# Patient Record
Sex: FEMALE | Race: WHITE | Employment: PART TIME | URBAN - METROPOLITAN AREA
[De-identification: names, ages, dates, MRNs, and addresses within clinical notes are randomized per-mention and may not be internally consistent; named-entity substitution may affect disease eponyms.]

---

## 2017-01-13 ENCOUNTER — OFFICE VISIT (OUTPATIENT)
Dept: FAMILY MEDICINE CLINIC | Age: 72
End: 2017-01-13

## 2017-01-13 VITALS
BODY MASS INDEX: 49.9 KG/M2 | RESPIRATION RATE: 16 BRPM | DIASTOLIC BLOOD PRESSURE: 78 MMHG | HEART RATE: 76 BPM | SYSTOLIC BLOOD PRESSURE: 118 MMHG | WEIGHT: 271.2 LBS | TEMPERATURE: 97.9 F | HEIGHT: 62 IN

## 2017-01-13 DIAGNOSIS — N18.30 CKD (CHRONIC KIDNEY DISEASE) STAGE 3, GFR 30-59 ML/MIN (HCC): ICD-10-CM

## 2017-01-13 DIAGNOSIS — E66.01 MORBID OBESITY WITH BMI OF 45.0-49.9, ADULT (HCC): ICD-10-CM

## 2017-01-13 DIAGNOSIS — E03.9 ACQUIRED HYPOTHYROIDISM: ICD-10-CM

## 2017-01-13 DIAGNOSIS — F41.1 GENERALIZED ANXIETY DISORDER: ICD-10-CM

## 2017-01-13 DIAGNOSIS — M75.102 ROTATOR CUFF SYNDROME, LEFT: ICD-10-CM

## 2017-01-13 DIAGNOSIS — E78.2 MIXED HYPERLIPIDEMIA: ICD-10-CM

## 2017-01-13 DIAGNOSIS — I10 ESSENTIAL HYPERTENSION: Primary | ICD-10-CM

## 2017-01-13 DIAGNOSIS — F33.42 RECURRENT MAJOR DEPRESSIVE DISORDER, IN FULL REMISSION (HCC): ICD-10-CM

## 2017-01-13 PROCEDURE — G8419 CALC BMI OUT NRM PARAM NOF/U: HCPCS | Performed by: FAMILY MEDICINE

## 2017-01-13 PROCEDURE — 3014F SCREEN MAMMO DOC REV: CPT | Performed by: FAMILY MEDICINE

## 2017-01-13 PROCEDURE — 99214 OFFICE O/P EST MOD 30 MIN: CPT | Performed by: FAMILY MEDICINE

## 2017-01-13 PROCEDURE — 1123F ACP DISCUSS/DSCN MKR DOCD: CPT | Performed by: FAMILY MEDICINE

## 2017-01-13 PROCEDURE — G8484 FLU IMMUNIZE NO ADMIN: HCPCS | Performed by: FAMILY MEDICINE

## 2017-01-13 PROCEDURE — 1090F PRES/ABSN URINE INCON ASSESS: CPT | Performed by: FAMILY MEDICINE

## 2017-01-13 PROCEDURE — 4040F PNEUMOC VAC/ADMIN/RCVD: CPT | Performed by: FAMILY MEDICINE

## 2017-01-13 PROCEDURE — 3017F COLORECTAL CA SCREEN DOC REV: CPT | Performed by: FAMILY MEDICINE

## 2017-01-13 PROCEDURE — G8399 PT W/DXA RESULTS DOCUMENT: HCPCS | Performed by: FAMILY MEDICINE

## 2017-01-13 PROCEDURE — 1036F TOBACCO NON-USER: CPT | Performed by: FAMILY MEDICINE

## 2017-01-13 PROCEDURE — G8427 DOCREV CUR MEDS BY ELIG CLIN: HCPCS | Performed by: FAMILY MEDICINE

## 2017-01-28 ENCOUNTER — OFFICE VISIT (OUTPATIENT)
Dept: FAMILY MEDICINE CLINIC | Age: 72
End: 2017-01-28

## 2017-01-28 VITALS
HEIGHT: 62 IN | OXYGEN SATURATION: 98 % | TEMPERATURE: 97.4 F | WEIGHT: 268.8 LBS | SYSTOLIC BLOOD PRESSURE: 112 MMHG | BODY MASS INDEX: 49.47 KG/M2 | RESPIRATION RATE: 22 BRPM | DIASTOLIC BLOOD PRESSURE: 78 MMHG | HEART RATE: 70 BPM

## 2017-01-28 DIAGNOSIS — R30.0 DYSURIA: ICD-10-CM

## 2017-01-28 DIAGNOSIS — R30.0 DYSURIA: Primary | ICD-10-CM

## 2017-01-28 DIAGNOSIS — N30.90 CYSTITIS: ICD-10-CM

## 2017-01-28 LAB
BILIRUBIN, POC: ABNORMAL
BLOOD URINE, POC: ABNORMAL
CLARITY, POC: ABNORMAL
COLOR, POC: ABNORMAL
GLUCOSE URINE, POC: ABNORMAL
KETONES, POC: ABNORMAL
LEUKOCYTE EST, POC: 500
NITRITE, POC: ABNORMAL
PH, POC: 6
PROTEIN, POC: ABNORMAL
SPECIFIC GRAVITY, POC: 1.02
UROBILINOGEN, POC: 3.5

## 2017-01-28 PROCEDURE — G8484 FLU IMMUNIZE NO ADMIN: HCPCS | Performed by: NURSE PRACTITIONER

## 2017-01-28 PROCEDURE — 1123F ACP DISCUSS/DSCN MKR DOCD: CPT | Performed by: NURSE PRACTITIONER

## 2017-01-28 PROCEDURE — 81003 URINALYSIS AUTO W/O SCOPE: CPT | Performed by: NURSE PRACTITIONER

## 2017-01-28 PROCEDURE — 1090F PRES/ABSN URINE INCON ASSESS: CPT | Performed by: NURSE PRACTITIONER

## 2017-01-28 PROCEDURE — 4040F PNEUMOC VAC/ADMIN/RCVD: CPT | Performed by: NURSE PRACTITIONER

## 2017-01-28 PROCEDURE — 1036F TOBACCO NON-USER: CPT | Performed by: NURSE PRACTITIONER

## 2017-01-28 PROCEDURE — 3017F COLORECTAL CA SCREEN DOC REV: CPT | Performed by: NURSE PRACTITIONER

## 2017-01-28 PROCEDURE — G8399 PT W/DXA RESULTS DOCUMENT: HCPCS | Performed by: NURSE PRACTITIONER

## 2017-01-28 PROCEDURE — G8419 CALC BMI OUT NRM PARAM NOF/U: HCPCS | Performed by: NURSE PRACTITIONER

## 2017-01-28 PROCEDURE — 99213 OFFICE O/P EST LOW 20 MIN: CPT | Performed by: NURSE PRACTITIONER

## 2017-01-28 PROCEDURE — 3014F SCREEN MAMMO DOC REV: CPT | Performed by: NURSE PRACTITIONER

## 2017-01-28 PROCEDURE — G8427 DOCREV CUR MEDS BY ELIG CLIN: HCPCS | Performed by: NURSE PRACTITIONER

## 2017-01-28 RX ORDER — CEFDINIR 300 MG/1
300 CAPSULE ORAL 2 TIMES DAILY
Qty: 20 CAPSULE | Refills: 0 | Status: SHIPPED | OUTPATIENT
Start: 2017-01-28 | End: 2017-02-07

## 2017-01-28 RX ORDER — PHENAZOPYRIDINE HYDROCHLORIDE 100 MG/1
100 TABLET, FILM COATED ORAL 3 TIMES DAILY PRN
Qty: 6 TABLET | Refills: 0 | Status: SHIPPED | OUTPATIENT
Start: 2017-01-28 | End: 2017-05-12 | Stop reason: ALTCHOICE

## 2017-01-28 ASSESSMENT — ENCOUNTER SYMPTOMS
BACK PAIN: 0
EYE DISCHARGE: 0
TROUBLE SWALLOWING: 0
GASTROINTESTINAL NEGATIVE: 1
RHINORRHEA: 0
FACIAL SWELLING: 0
WHEEZING: 0
ALLERGIC/IMMUNOLOGIC NEGATIVE: 1
SINUS PRESSURE: 0
EYE REDNESS: 0
NAUSEA: 0
CHEST TIGHTNESS: 0
EYE ITCHING: 0
VOMITING: 0
SORE THROAT: 0
VOICE CHANGE: 0
SHORTNESS OF BREATH: 0
COUGH: 0
EYE PAIN: 0

## 2017-01-30 LAB
ORGANISM: ABNORMAL
URINE CULTURE, ROUTINE: ABNORMAL

## 2017-04-11 ENCOUNTER — OFFICE VISIT (OUTPATIENT)
Dept: FAMILY MEDICINE CLINIC | Age: 72
End: 2017-04-11

## 2017-04-11 VITALS
SYSTOLIC BLOOD PRESSURE: 126 MMHG | HEIGHT: 62 IN | BODY MASS INDEX: 50.05 KG/M2 | OXYGEN SATURATION: 97 % | RESPIRATION RATE: 22 BRPM | TEMPERATURE: 97.2 F | WEIGHT: 272 LBS | DIASTOLIC BLOOD PRESSURE: 74 MMHG | HEART RATE: 83 BPM

## 2017-04-11 DIAGNOSIS — R39.9 UTI SYMPTOMS: ICD-10-CM

## 2017-04-11 DIAGNOSIS — N30.00 ACUTE CYSTITIS WITHOUT HEMATURIA: Primary | ICD-10-CM

## 2017-04-11 LAB
BILIRUBIN, POC: ABNORMAL
BLOOD URINE, POC: ABNORMAL
CLARITY, POC: ABNORMAL
COLOR, POC: ABNORMAL
GLUCOSE URINE, POC: ABNORMAL
KETONES, POC: ABNORMAL
LEUKOCYTE EST, POC: 500
NITRITE, POC: ABNORMAL
PH, POC: 6
PROTEIN, POC: 0.15
SPECIFIC GRAVITY, POC: 1.03
UROBILINOGEN, POC: 3.5

## 2017-04-11 PROCEDURE — G8427 DOCREV CUR MEDS BY ELIG CLIN: HCPCS | Performed by: NURSE PRACTITIONER

## 2017-04-11 PROCEDURE — 3014F SCREEN MAMMO DOC REV: CPT | Performed by: NURSE PRACTITIONER

## 2017-04-11 PROCEDURE — G8399 PT W/DXA RESULTS DOCUMENT: HCPCS | Performed by: NURSE PRACTITIONER

## 2017-04-11 PROCEDURE — 4040F PNEUMOC VAC/ADMIN/RCVD: CPT | Performed by: NURSE PRACTITIONER

## 2017-04-11 PROCEDURE — G8417 CALC BMI ABV UP PARAM F/U: HCPCS | Performed by: NURSE PRACTITIONER

## 2017-04-11 PROCEDURE — 99213 OFFICE O/P EST LOW 20 MIN: CPT | Performed by: NURSE PRACTITIONER

## 2017-04-11 PROCEDURE — 81003 URINALYSIS AUTO W/O SCOPE: CPT | Performed by: NURSE PRACTITIONER

## 2017-04-11 PROCEDURE — 3017F COLORECTAL CA SCREEN DOC REV: CPT | Performed by: NURSE PRACTITIONER

## 2017-04-11 PROCEDURE — 1090F PRES/ABSN URINE INCON ASSESS: CPT | Performed by: NURSE PRACTITIONER

## 2017-04-11 PROCEDURE — 1123F ACP DISCUSS/DSCN MKR DOCD: CPT | Performed by: NURSE PRACTITIONER

## 2017-04-11 PROCEDURE — 1036F TOBACCO NON-USER: CPT | Performed by: NURSE PRACTITIONER

## 2017-04-11 RX ORDER — CIPROFLOXACIN 500 MG/1
500 TABLET, FILM COATED ORAL 2 TIMES DAILY
Qty: 20 TABLET | Refills: 0 | Status: SHIPPED | OUTPATIENT
Start: 2017-04-11 | End: 2017-04-21

## 2017-04-11 ASSESSMENT — ENCOUNTER SYMPTOMS
BACK PAIN: 0
NAUSEA: 0

## 2017-04-12 DIAGNOSIS — N30.00 ACUTE CYSTITIS WITHOUT HEMATURIA: ICD-10-CM

## 2017-04-14 LAB
ORGANISM: ABNORMAL
URINE CULTURE, ROUTINE: ABNORMAL

## 2017-04-25 ENCOUNTER — NURSE ONLY (OUTPATIENT)
Dept: FAMILY MEDICINE CLINIC | Age: 72
End: 2017-04-25

## 2017-04-25 ENCOUNTER — TELEPHONE (OUTPATIENT)
Dept: FAMILY MEDICINE CLINIC | Age: 72
End: 2017-04-25

## 2017-04-25 DIAGNOSIS — N39.0 URINARY TRACT INFECTION WITH HEMATURIA, SITE UNSPECIFIED: ICD-10-CM

## 2017-04-25 DIAGNOSIS — R31.9 URINARY TRACT INFECTION WITH HEMATURIA, SITE UNSPECIFIED: Primary | ICD-10-CM

## 2017-04-25 DIAGNOSIS — R31.9 URINARY TRACT INFECTION WITH HEMATURIA, SITE UNSPECIFIED: ICD-10-CM

## 2017-04-25 DIAGNOSIS — N39.0 URINARY TRACT INFECTION WITH HEMATURIA, SITE UNSPECIFIED: Primary | ICD-10-CM

## 2017-04-25 DIAGNOSIS — N30.90 CYSTITIS: Primary | ICD-10-CM

## 2017-04-25 LAB
BILIRUBIN, POC: NEGATIVE
BLOOD URINE, POC: NORMAL
CLARITY, POC: NORMAL
COLOR, POC: NORMAL
GLUCOSE URINE, POC: NEGATIVE
KETONES, POC: NEGATIVE
LEUKOCYTE EST, POC: NORMAL
NITRITE, POC: NEGATIVE
PH, POC: 6
PROTEIN, POC: NORMAL
SPECIFIC GRAVITY, POC: 1.03
UROBILINOGEN, POC: 3.5

## 2017-04-25 PROCEDURE — 81003 URINALYSIS AUTO W/O SCOPE: CPT | Performed by: FAMILY MEDICINE

## 2017-04-27 LAB — URINE CULTURE, ROUTINE: NORMAL

## 2017-04-29 LAB
ALBUMIN SERPL-MCNC: 4.2 G/DL
ALP BLD-CCNC: 76 U/L
ALT SERPL-CCNC: 13 U/L
AST SERPL-CCNC: 17 U/L
BASOPHILS ABSOLUTE: 0.1 /ΜL
BASOPHILS RELATIVE PERCENT: 1 %
BILIRUB SERPL-MCNC: 0.3 MG/DL (ref 0.1–1.4)
BUN BLDV-MCNC: 28 MG/DL
CALCIUM SERPL-MCNC: 9.9 MG/DL
CHLORIDE BLD-SCNC: 102 MMOL/L
CHOLESTEROL, TOTAL: 173 MG/DL
CHOLESTEROL/HDL RATIO: ABNORMAL
CO2: 22 MMOL/L
CREAT SERPL-MCNC: 1.05 MG/DL
EOSINOPHILS ABSOLUTE: 0.4 /ΜL
EOSINOPHILS RELATIVE PERCENT: 6 %
GFR CALCULATED: NORMAL
GLUCOSE BLD-MCNC: 96 MG/DL
HCT VFR BLD CALC: 37.6 % (ref 36–46)
HDLC SERPL-MCNC: 91 MG/DL (ref 35–70)
HEMOGLOBIN: 12.1 G/DL (ref 12–16)
LDL CHOLESTEROL CALCULATED: 60 MG/DL (ref 0–160)
LYMPHOCYTES ABSOLUTE: 2.2 /ΜL
LYMPHOCYTES RELATIVE PERCENT: 36 %
MCH RBC QN AUTO: 26.7 PG
MCHC RBC AUTO-ENTMCNC: 32.2 G/DL
MCV RBC AUTO: 83 FL
MONOCYTES ABSOLUTE: 0.4 /ΜL
MONOCYTES RELATIVE PERCENT: 7 %
NEUTROPHILS ABSOLUTE: 3 /ΜL
NEUTROPHILS RELATIVE PERCENT: 50 %
PDW BLD-RTO: 15.2 %
PLATELET # BLD: 358 K/ΜL
PMV BLD AUTO: NORMAL FL
POTASSIUM SERPL-SCNC: 4.9 MMOL/L
RBC # BLD: 4.53 10^6/ΜL
SODIUM BLD-SCNC: 140 MMOL/L
T4 FREE: 1.48
TOTAL PROTEIN: 7.2
TRIGL SERPL-MCNC: 108 MG/DL
TSH SERPL DL<=0.05 MIU/L-ACNC: 0.88 UIU/ML
VLDLC SERPL CALC-MCNC: 22 MG/DL
WBC # BLD: 6.1 10^3/ML

## 2017-05-01 DIAGNOSIS — E03.9 ACQUIRED HYPOTHYROIDISM: ICD-10-CM

## 2017-05-01 DIAGNOSIS — E78.2 MIXED HYPERLIPIDEMIA: ICD-10-CM

## 2017-05-01 DIAGNOSIS — I10 ESSENTIAL HYPERTENSION: ICD-10-CM

## 2017-05-01 DIAGNOSIS — N18.30 CKD (CHRONIC KIDNEY DISEASE) STAGE 3, GFR 30-59 ML/MIN (HCC): ICD-10-CM

## 2017-05-12 ENCOUNTER — OFFICE VISIT (OUTPATIENT)
Dept: FAMILY MEDICINE CLINIC | Age: 72
End: 2017-05-12

## 2017-05-12 VITALS
HEIGHT: 62 IN | BODY MASS INDEX: 50.38 KG/M2 | DIASTOLIC BLOOD PRESSURE: 80 MMHG | WEIGHT: 273.8 LBS | SYSTOLIC BLOOD PRESSURE: 138 MMHG | RESPIRATION RATE: 18 BRPM | TEMPERATURE: 97.3 F | HEART RATE: 94 BPM

## 2017-05-12 DIAGNOSIS — I10 ESSENTIAL HYPERTENSION: Primary | ICD-10-CM

## 2017-05-12 DIAGNOSIS — N18.30 CKD (CHRONIC KIDNEY DISEASE) STAGE 3, GFR 30-59 ML/MIN (HCC): ICD-10-CM

## 2017-05-12 DIAGNOSIS — E66.01 MORBID OBESITY WITH BMI OF 50.0-59.9, ADULT (HCC): ICD-10-CM

## 2017-05-12 DIAGNOSIS — F41.1 GENERALIZED ANXIETY DISORDER: ICD-10-CM

## 2017-05-12 DIAGNOSIS — E03.9 ACQUIRED HYPOTHYROIDISM: ICD-10-CM

## 2017-05-12 DIAGNOSIS — E78.2 MIXED HYPERLIPIDEMIA: ICD-10-CM

## 2017-05-12 PROCEDURE — G8399 PT W/DXA RESULTS DOCUMENT: HCPCS | Performed by: FAMILY MEDICINE

## 2017-05-12 PROCEDURE — 1090F PRES/ABSN URINE INCON ASSESS: CPT | Performed by: FAMILY MEDICINE

## 2017-05-12 PROCEDURE — 99214 OFFICE O/P EST MOD 30 MIN: CPT | Performed by: FAMILY MEDICINE

## 2017-05-12 PROCEDURE — 4040F PNEUMOC VAC/ADMIN/RCVD: CPT | Performed by: FAMILY MEDICINE

## 2017-05-12 PROCEDURE — G8417 CALC BMI ABV UP PARAM F/U: HCPCS | Performed by: FAMILY MEDICINE

## 2017-05-12 PROCEDURE — 1036F TOBACCO NON-USER: CPT | Performed by: FAMILY MEDICINE

## 2017-05-12 PROCEDURE — 1123F ACP DISCUSS/DSCN MKR DOCD: CPT | Performed by: FAMILY MEDICINE

## 2017-05-12 PROCEDURE — 3014F SCREEN MAMMO DOC REV: CPT | Performed by: FAMILY MEDICINE

## 2017-05-12 PROCEDURE — 3017F COLORECTAL CA SCREEN DOC REV: CPT | Performed by: FAMILY MEDICINE

## 2017-05-12 PROCEDURE — G8427 DOCREV CUR MEDS BY ELIG CLIN: HCPCS | Performed by: FAMILY MEDICINE

## 2017-05-12 RX ORDER — LISINOPRIL AND HYDROCHLOROTHIAZIDE 25; 20 MG/1; MG/1
1 TABLET ORAL DAILY
Qty: 90 TABLET | Refills: 2 | Status: SHIPPED | OUTPATIENT
Start: 2017-05-12 | End: 2018-01-12 | Stop reason: SDUPTHER

## 2017-05-12 RX ORDER — MELOXICAM 15 MG/1
15 TABLET ORAL DAILY
Qty: 90 TABLET | Refills: 2 | Status: SHIPPED | OUTPATIENT
Start: 2017-05-12 | End: 2018-01-12 | Stop reason: ALTCHOICE

## 2017-05-12 RX ORDER — UBIDECARENONE 75 MG
100 CAPSULE ORAL DAILY
COMMUNITY

## 2017-05-12 RX ORDER — LEVOTHYROXINE SODIUM 175 UG/1
175 TABLET ORAL DAILY
Qty: 90 TABLET | Refills: 2 | Status: SHIPPED | OUTPATIENT
Start: 2017-05-12 | End: 2018-01-12 | Stop reason: DRUGHIGH

## 2017-05-12 RX ORDER — OMEPRAZOLE 40 MG/1
40 CAPSULE, DELAYED RELEASE ORAL DAILY
Qty: 90 CAPSULE | Refills: 2 | Status: SHIPPED | OUTPATIENT
Start: 2017-05-12 | End: 2018-01-12 | Stop reason: ALTCHOICE

## 2017-05-12 ASSESSMENT — PATIENT HEALTH QUESTIONNAIRE - PHQ9
2. FEELING DOWN, DEPRESSED OR HOPELESS: 0
SUM OF ALL RESPONSES TO PHQ QUESTIONS 1-9: 0
SUM OF ALL RESPONSES TO PHQ9 QUESTIONS 1 & 2: 0
1. LITTLE INTEREST OR PLEASURE IN DOING THINGS: 0

## 2017-06-15 ENCOUNTER — OFFICE VISIT (OUTPATIENT)
Dept: FAMILY MEDICINE CLINIC | Age: 72
End: 2017-06-15

## 2017-06-15 VITALS
TEMPERATURE: 97.2 F | DIASTOLIC BLOOD PRESSURE: 88 MMHG | HEART RATE: 78 BPM | SYSTOLIC BLOOD PRESSURE: 130 MMHG | HEIGHT: 62 IN | OXYGEN SATURATION: 98 % | RESPIRATION RATE: 24 BRPM

## 2017-06-15 DIAGNOSIS — R39.9 UTI SYMPTOMS: ICD-10-CM

## 2017-06-15 DIAGNOSIS — N30.01 ACUTE CYSTITIS WITH HEMATURIA: Primary | ICD-10-CM

## 2017-06-15 PROCEDURE — G8427 DOCREV CUR MEDS BY ELIG CLIN: HCPCS | Performed by: NURSE PRACTITIONER

## 2017-06-15 PROCEDURE — 1036F TOBACCO NON-USER: CPT | Performed by: NURSE PRACTITIONER

## 2017-06-15 PROCEDURE — 1123F ACP DISCUSS/DSCN MKR DOCD: CPT | Performed by: NURSE PRACTITIONER

## 2017-06-15 PROCEDURE — 1090F PRES/ABSN URINE INCON ASSESS: CPT | Performed by: NURSE PRACTITIONER

## 2017-06-15 PROCEDURE — 4040F PNEUMOC VAC/ADMIN/RCVD: CPT | Performed by: NURSE PRACTITIONER

## 2017-06-15 PROCEDURE — 99213 OFFICE O/P EST LOW 20 MIN: CPT | Performed by: NURSE PRACTITIONER

## 2017-06-15 PROCEDURE — G8399 PT W/DXA RESULTS DOCUMENT: HCPCS | Performed by: NURSE PRACTITIONER

## 2017-06-15 PROCEDURE — 3014F SCREEN MAMMO DOC REV: CPT | Performed by: NURSE PRACTITIONER

## 2017-06-15 PROCEDURE — G8417 CALC BMI ABV UP PARAM F/U: HCPCS | Performed by: NURSE PRACTITIONER

## 2017-06-15 PROCEDURE — 3017F COLORECTAL CA SCREEN DOC REV: CPT | Performed by: NURSE PRACTITIONER

## 2017-06-15 PROCEDURE — 81003 URINALYSIS AUTO W/O SCOPE: CPT | Performed by: NURSE PRACTITIONER

## 2017-06-15 RX ORDER — CIPROFLOXACIN 500 MG/1
500 TABLET, FILM COATED ORAL 2 TIMES DAILY
Qty: 20 TABLET | Refills: 0 | Status: SHIPPED | OUTPATIENT
Start: 2017-06-15 | End: 2017-06-25

## 2017-06-15 ASSESSMENT — ENCOUNTER SYMPTOMS
VOMITING: 0
NAUSEA: 0

## 2017-06-16 DIAGNOSIS — R39.9 UTI SYMPTOMS: ICD-10-CM

## 2017-06-16 DIAGNOSIS — N30.01 ACUTE CYSTITIS WITH HEMATURIA: ICD-10-CM

## 2017-06-18 LAB
ORGANISM: ABNORMAL
URINE CULTURE, ROUTINE: ABNORMAL

## 2017-08-15 LAB
ALBUMIN SERPL-MCNC: 4 G/DL
ALP BLD-CCNC: 76 U/L
ALT SERPL-CCNC: 15 U/L
ANION GAP SERPL CALCULATED.3IONS-SCNC: 12 MMOL/L
AST SERPL-CCNC: 19 U/L
BASOPHILS ABSOLUTE: 0.07 /ΜL
BASOPHILS RELATIVE PERCENT: 1.2 %
BILIRUB SERPL-MCNC: 0.4 MG/DL (ref 0.1–1.4)
BUN BLDV-MCNC: NORMAL MG/DL
CALCIUM SERPL-MCNC: 9.9 MG/DL
CHLORIDE BLD-SCNC: 105 MMOL/L
CHOLESTEROL, TOTAL: 170 MG/DL
CHOLESTEROL/HDL RATIO: 2.1
CO2: 25 MMOL/L
CREAT SERPL-MCNC: 0.99 MG/DL
EOSINOPHILS ABSOLUTE: 0.17 /ΜL
EOSINOPHILS RELATIVE PERCENT: 3 %
GFR CALCULATED: 55
GLUCOSE BLD-MCNC: 97 MG/DL
HCT VFR BLD CALC: 37.1 % (ref 36–46)
HDLC SERPL-MCNC: 82 MG/DL (ref 35–70)
HEMOGLOBIN: 11.9 G/DL (ref 12–16)
LDL CHOLESTEROL CALCULATED: 67 MG/DL (ref 0–160)
LYMPHOCYTES ABSOLUTE: 1.84 /ΜL
LYMPHOCYTES RELATIVE PERCENT: 32.7 %
MCH RBC QN AUTO: 27.7 PG
MCHC RBC AUTO-ENTMCNC: 32.1 G/DL
MCV RBC AUTO: 86.5 FL
MONOCYTES ABSOLUTE: 0.4 /ΜL
MONOCYTES RELATIVE PERCENT: 7.1 %
NEUTROPHILS ABSOLUTE: 3.13 /ΜL
NEUTROPHILS RELATIVE PERCENT: 55.8 %
PDW BLD-RTO: 15.6 %
PLATELET # BLD: 338 K/ΜL
PMV BLD AUTO: 10.2 FL
POTASSIUM SERPL-SCNC: 4.6 MMOL/L
RBC # BLD: 4.29 10^6/ΜL
SODIUM BLD-SCNC: 137 MMOL/L
T4 FREE: 1.1
TOTAL PROTEIN: 7.1
TRIGL SERPL-MCNC: 107 MG/DL
TSH SERPL DL<=0.05 MIU/L-ACNC: 0.2 UIU/ML
VLDLC SERPL CALC-MCNC: 21 MG/DL
WBC # BLD: 5.6 10^3/ML

## 2017-08-16 DIAGNOSIS — E03.9 ACQUIRED HYPOTHYROIDISM: ICD-10-CM

## 2017-08-16 DIAGNOSIS — I10 ESSENTIAL HYPERTENSION: ICD-10-CM

## 2017-08-16 DIAGNOSIS — E78.2 MIXED HYPERLIPIDEMIA: ICD-10-CM

## 2017-08-22 ENCOUNTER — OFFICE VISIT (OUTPATIENT)
Dept: FAMILY MEDICINE CLINIC | Age: 72
End: 2017-08-22

## 2017-08-22 VITALS
HEART RATE: 88 BPM | WEIGHT: 290 LBS | RESPIRATION RATE: 25 BRPM | BODY MASS INDEX: 53.37 KG/M2 | SYSTOLIC BLOOD PRESSURE: 128 MMHG | DIASTOLIC BLOOD PRESSURE: 86 MMHG | TEMPERATURE: 98.2 F | HEIGHT: 62 IN | OXYGEN SATURATION: 97 %

## 2017-08-22 DIAGNOSIS — N39.0 URINARY TRACT INFECTION WITHOUT HEMATURIA, SITE UNSPECIFIED: Primary | ICD-10-CM

## 2017-08-22 DIAGNOSIS — R39.9 UTI SYMPTOMS: ICD-10-CM

## 2017-08-22 LAB
BILIRUBIN, POC: ABNORMAL
BLOOD URINE, POC: ABNORMAL
CLARITY, POC: ABNORMAL
COLOR, POC: ABNORMAL
GLUCOSE URINE, POC: ABNORMAL
KETONES, POC: ABNORMAL
LEUKOCYTE EST, POC: 500
NITRITE, POC: ABNORMAL
PH, POC: 5.5
PROTEIN, POC: 0.15
SPECIFIC GRAVITY, POC: 1.03
UROBILINOGEN, POC: 3.5

## 2017-08-22 PROCEDURE — 4040F PNEUMOC VAC/ADMIN/RCVD: CPT | Performed by: NURSE PRACTITIONER

## 2017-08-22 PROCEDURE — 1090F PRES/ABSN URINE INCON ASSESS: CPT | Performed by: NURSE PRACTITIONER

## 2017-08-22 PROCEDURE — 3017F COLORECTAL CA SCREEN DOC REV: CPT | Performed by: NURSE PRACTITIONER

## 2017-08-22 PROCEDURE — 99213 OFFICE O/P EST LOW 20 MIN: CPT | Performed by: NURSE PRACTITIONER

## 2017-08-22 PROCEDURE — G8399 PT W/DXA RESULTS DOCUMENT: HCPCS | Performed by: NURSE PRACTITIONER

## 2017-08-22 PROCEDURE — G8417 CALC BMI ABV UP PARAM F/U: HCPCS | Performed by: NURSE PRACTITIONER

## 2017-08-22 PROCEDURE — 1036F TOBACCO NON-USER: CPT | Performed by: NURSE PRACTITIONER

## 2017-08-22 PROCEDURE — 1123F ACP DISCUSS/DSCN MKR DOCD: CPT | Performed by: NURSE PRACTITIONER

## 2017-08-22 PROCEDURE — G8427 DOCREV CUR MEDS BY ELIG CLIN: HCPCS | Performed by: NURSE PRACTITIONER

## 2017-08-22 PROCEDURE — 3014F SCREEN MAMMO DOC REV: CPT | Performed by: NURSE PRACTITIONER

## 2017-08-22 PROCEDURE — 81003 URINALYSIS AUTO W/O SCOPE: CPT | Performed by: NURSE PRACTITIONER

## 2017-08-22 RX ORDER — LEVOTHYROXINE SODIUM 88 UG/1
TABLET ORAL
Refills: 0 | COMMUNITY
Start: 2017-07-19 | End: 2018-01-12 | Stop reason: SDUPTHER

## 2017-08-22 RX ORDER — CIPROFLOXACIN 500 MG/1
500 TABLET, FILM COATED ORAL 2 TIMES DAILY
Qty: 20 TABLET | Refills: 0 | Status: SHIPPED | OUTPATIENT
Start: 2017-08-22 | End: 2017-09-01

## 2017-09-12 ENCOUNTER — OFFICE VISIT (OUTPATIENT)
Dept: FAMILY MEDICINE CLINIC | Age: 72
End: 2017-09-12

## 2017-09-12 VITALS
TEMPERATURE: 97.5 F | SYSTOLIC BLOOD PRESSURE: 122 MMHG | HEART RATE: 80 BPM | HEIGHT: 62 IN | BODY MASS INDEX: 49.94 KG/M2 | WEIGHT: 271.4 LBS | DIASTOLIC BLOOD PRESSURE: 84 MMHG

## 2017-09-12 DIAGNOSIS — Z12.31 ENCOUNTER FOR SCREENING MAMMOGRAM FOR BREAST CANCER: ICD-10-CM

## 2017-09-12 DIAGNOSIS — E78.2 MIXED HYPERLIPIDEMIA: ICD-10-CM

## 2017-09-12 DIAGNOSIS — I10 ESSENTIAL HYPERTENSION: Primary | ICD-10-CM

## 2017-09-12 DIAGNOSIS — E03.9 ACQUIRED HYPOTHYROIDISM: ICD-10-CM

## 2017-09-12 DIAGNOSIS — Z23 NEED FOR INFLUENZA VACCINATION: ICD-10-CM

## 2017-09-12 PROCEDURE — 90662 IIV NO PRSV INCREASED AG IM: CPT | Performed by: FAMILY MEDICINE

## 2017-09-12 PROCEDURE — 3014F SCREEN MAMMO DOC REV: CPT | Performed by: FAMILY MEDICINE

## 2017-09-12 PROCEDURE — G8417 CALC BMI ABV UP PARAM F/U: HCPCS | Performed by: FAMILY MEDICINE

## 2017-09-12 PROCEDURE — 3017F COLORECTAL CA SCREEN DOC REV: CPT | Performed by: FAMILY MEDICINE

## 2017-09-12 PROCEDURE — G8427 DOCREV CUR MEDS BY ELIG CLIN: HCPCS | Performed by: FAMILY MEDICINE

## 2017-09-12 PROCEDURE — 1090F PRES/ABSN URINE INCON ASSESS: CPT | Performed by: FAMILY MEDICINE

## 2017-09-12 PROCEDURE — G8399 PT W/DXA RESULTS DOCUMENT: HCPCS | Performed by: FAMILY MEDICINE

## 2017-09-12 PROCEDURE — 1123F ACP DISCUSS/DSCN MKR DOCD: CPT | Performed by: FAMILY MEDICINE

## 2017-09-12 PROCEDURE — 1036F TOBACCO NON-USER: CPT | Performed by: FAMILY MEDICINE

## 2017-09-12 PROCEDURE — G0008 ADMIN INFLUENZA VIRUS VAC: HCPCS | Performed by: FAMILY MEDICINE

## 2017-09-12 PROCEDURE — 99214 OFFICE O/P EST MOD 30 MIN: CPT | Performed by: FAMILY MEDICINE

## 2017-09-12 PROCEDURE — 4040F PNEUMOC VAC/ADMIN/RCVD: CPT | Performed by: FAMILY MEDICINE

## 2017-09-12 ASSESSMENT — PATIENT HEALTH QUESTIONNAIRE - PHQ9
SUM OF ALL RESPONSES TO PHQ9 QUESTIONS 1 & 2: 0
2. FEELING DOWN, DEPRESSED OR HOPELESS: 0
1. LITTLE INTEREST OR PLEASURE IN DOING THINGS: 0
SUM OF ALL RESPONSES TO PHQ QUESTIONS 1-9: 0

## 2017-09-28 ENCOUNTER — HOSPITAL ENCOUNTER (OUTPATIENT)
Dept: WOMENS IMAGING | Age: 72
Discharge: HOME | End: 2017-09-28
Payer: MEDICARE

## 2017-09-28 DIAGNOSIS — Z12.31 ENCOUNTER FOR SCREENING MAMMOGRAM FOR BREAST CANCER: ICD-10-CM

## 2017-09-28 PROCEDURE — G0202 SCR MAMMO BI INCL CAD: HCPCS

## 2017-10-03 ENCOUNTER — OFFICE VISIT (OUTPATIENT)
Dept: FAMILY MEDICINE CLINIC | Age: 72
End: 2017-10-03

## 2017-10-03 ENCOUNTER — TELEPHONE (OUTPATIENT)
Dept: OBGYN | Age: 72
End: 2017-10-03

## 2017-10-03 VITALS
SYSTOLIC BLOOD PRESSURE: 124 MMHG | HEIGHT: 62 IN | RESPIRATION RATE: 16 BRPM | BODY MASS INDEX: 49.69 KG/M2 | WEIGHT: 270 LBS | TEMPERATURE: 97.1 F | HEART RATE: 80 BPM | DIASTOLIC BLOOD PRESSURE: 76 MMHG

## 2017-10-03 DIAGNOSIS — N95.0 POST-MENOPAUSAL BLEEDING: Primary | ICD-10-CM

## 2017-10-03 DIAGNOSIS — F41.1 GENERALIZED ANXIETY DISORDER: ICD-10-CM

## 2017-10-03 DIAGNOSIS — R31.9 BLOOD IN URINE: ICD-10-CM

## 2017-10-03 DIAGNOSIS — N93.9 VAGINA BLEEDING: ICD-10-CM

## 2017-10-03 LAB
BILIRUBIN, POC: NORMAL
BLOOD URINE, POC: NORMAL
CLARITY, POC: NORMAL
COLOR, POC: YELLOW
GLUCOSE URINE, POC: NORMAL
KETONES, POC: NORMAL
LEUKOCYTE EST, POC: NORMAL
NITRITE, POC: NORMAL
PH, POC: 5.5
PROTEIN, POC: NORMAL
SPECIFIC GRAVITY, POC: 1.03
UROBILINOGEN, POC: 3.5

## 2017-10-03 PROCEDURE — 4040F PNEUMOC VAC/ADMIN/RCVD: CPT | Performed by: FAMILY MEDICINE

## 2017-10-03 PROCEDURE — 1090F PRES/ABSN URINE INCON ASSESS: CPT | Performed by: FAMILY MEDICINE

## 2017-10-03 PROCEDURE — 81003 URINALYSIS AUTO W/O SCOPE: CPT | Performed by: FAMILY MEDICINE

## 2017-10-03 PROCEDURE — G8427 DOCREV CUR MEDS BY ELIG CLIN: HCPCS | Performed by: FAMILY MEDICINE

## 2017-10-03 PROCEDURE — G8399 PT W/DXA RESULTS DOCUMENT: HCPCS | Performed by: FAMILY MEDICINE

## 2017-10-03 PROCEDURE — G8484 FLU IMMUNIZE NO ADMIN: HCPCS | Performed by: FAMILY MEDICINE

## 2017-10-03 PROCEDURE — 1123F ACP DISCUSS/DSCN MKR DOCD: CPT | Performed by: FAMILY MEDICINE

## 2017-10-03 PROCEDURE — 3014F SCREEN MAMMO DOC REV: CPT | Performed by: FAMILY MEDICINE

## 2017-10-03 PROCEDURE — 99213 OFFICE O/P EST LOW 20 MIN: CPT | Performed by: FAMILY MEDICINE

## 2017-10-03 PROCEDURE — G8417 CALC BMI ABV UP PARAM F/U: HCPCS | Performed by: FAMILY MEDICINE

## 2017-10-03 PROCEDURE — 1036F TOBACCO NON-USER: CPT | Performed by: FAMILY MEDICINE

## 2017-10-03 PROCEDURE — 3017F COLORECTAL CA SCREEN DOC REV: CPT | Performed by: FAMILY MEDICINE

## 2017-10-03 ASSESSMENT — PATIENT HEALTH QUESTIONNAIRE - PHQ9
SUM OF ALL RESPONSES TO PHQ9 QUESTIONS 1 & 2: 0
1. LITTLE INTEREST OR PLEASURE IN DOING THINGS: 0
2. FEELING DOWN, DEPRESSED OR HOPELESS: 0
SUM OF ALL RESPONSES TO PHQ QUESTIONS 1-9: 0

## 2017-10-03 NOTE — MR AVS SNAPSHOT
After Visit Summary             June BOOIGE/ Eze Ng [address]   10/3/2017 9:15 AM   Office Visit    Description:  Female : 1945   Provider:  Yolanda Jordan MD   Department:  [facility]              Your Follow-Up and Future Appointments         Below is a list of your follow-up and future appointments. This may not be a complete list as you may have made appointments directly with providers that we are not aware of or your providers may have made some for you. Please call your providers to confirm appointments. It is important to keep your appointments. Please bring your current insurance card, photo ID, co-pay, and all medication bottles to your appointment. If self-pay, payment is expected at the time of service. Your To-Do List     Future Appointments Provider Department Dept Phone    2018 9:00 AM MD Tatyana Holly [phone]    Please arrive 15 minutes prior to appointment, bring photo ID and insurance card. Future Orders Complete By Expires    Urine Culture [[id number] Custom]  10/3/2017 10/3/2018         Information from Your Visit        Department     Name Address Phone Fax    [facility] [address] [facility]. [place] [address] [phone]      You Were Seen for:         Comments    Post-menopausal bleeding   [[id number]]         Vital Signs     Blood Pressure Pulse Temperature Respirations Height Weight    124/76 (Site: Right Arm, Position: Sitting, Cuff Size: Large Adult) 80 97.1 °F (36.2 °C) (Temporal) 16 5' 2\" (1.575 m) 270 lb (122.5 kg)    Last Menstrual Period Body Mass Index Smoking Status             10/01/1993 49.38 kg/m2 Former Smoker         Additional Information about your Body Mass Index (BMI)           Your BMI as listed above is considered obese (30 or more). BMI is an estimate of body fat, calculated from your height and weight.   The higher your BMI, the greater your risk of heart disease, high blood pressure, type 2 diabetes, stroke, gallstones, arthritis, sleep apnea, and certain cancers. BMI is not perfect. It may overestimate body fat in athletes and people who are more muscular. Even a small weight loss (between 5 and 10 percent of your current weight) by decreasing your calorie intake and becoming more physically active will help lower your risk of developing or worsening diseases associated with obesity. Learn more at: Translimit.uk          Instructions         Vaginal Bleeding After Menopause: Care Instructions  Your Care Instructions  Vaginal bleeding after menopause can have many causes, including infection, inflammation, prescription hormones, abnormal growths, and injury. Your doctor may want you to have more tests to find the cause of your vaginal bleeding. Follow-up care is a key part of your treatment and safety. Be sure to make and go to all appointments, and call your doctor if you are having problems. It's also a good idea to know your test results and keep a list of the medicines you take. How can you care for yourself at home? · If your doctor gave you medicine, take it exactly as prescribed. Call your doctor if you think you are having a problem with your medicine. · Do not have sex or put anything inside your vagina until you talk with your doctor. · Do not douche. When should you call for help? Call 911 anytime you think you may need emergency care. For example, call if:  · You passed out (lost consciousness). · You have sudden, severe pain in your belly or pelvis. Call your doctor now or seek immediate medical care if:  · You have severe vaginal bleeding. You are soaking through a pad each hour for 2 or more hours. · You are dizzy or lightheaded, or you feel like you may faint. · You have a fever. · You have new belly or pelvic pain. · You have vaginal discharge that smells bad. · You feel weak and tired, and your skin is pale. Influenza, High Dose 9/12/2017, 9/9/2016, 9/29/2015    Pneumococcal 13-valent Conjugate (Airam Cola) 1/8/2016, 9/29/2015    Pneumococcal Polysaccharide ([id number]) 8/25/2014      Preventive Care        Date Due    Tetanus Combination Vaccine (1 - Tdap) 9/9/2018 (Originally 10/22/1964)    Hepatitis C screening is recommended for all adults regardless of risk factors born between [facility] at least once (lifetime) who have never been tested. 9/9/2018 (Originally 1945)    Mammograms are recommended every 2 years for low/average risk patients aged 48 - 69, and every year for high risk patients per updated national guidelines. However these guidelines can be individualized by your provider. 9/28/2019    Cholesterol Screening 8/15/2022    Colonoscopy 3/8/2027            [employer] Signup           Our records indicate that you have an active [employer] account. You can view your After Visit Summary by going to [email or web address] and logging in with your [employer] username and password. If you don't have a [employer] username and password but a parent or guardian has access to your record, the parent or guardian should login with their own [employer] username and password and access your record to view the After Visit Summary. Additional Information  If you have questions, please contact the physician practice where you receive care. Remember, [employer] is NOT to be used for urgent needs. For medical emergencies, dial 911. For questions regarding your [employer] account call [phone]. If you have a clinical question, please call your doctor's office.

## 2017-10-03 NOTE — PATIENT INSTRUCTIONS
Vaginal Bleeding After Menopause: Care Instructions  Your Care Instructions  Vaginal bleeding after menopause can have many causes, including infection, inflammation, prescription hormones, abnormal growths, and injury. Your doctor may want you to have more tests to find the cause of your vaginal bleeding. Follow-up care is a key part of your treatment and safety. Be sure to make and go to all appointments, and call your doctor if you are having problems. It's also a good idea to know your test results and keep a list of the medicines you take. How can you care for yourself at home? · If your doctor gave you medicine, take it exactly as prescribed. Call your doctor if you think you are having a problem with your medicine. · Do not have sex or put anything inside your vagina until you talk with your doctor. · Do not douche. When should you call for help? Call 911 anytime you think you may need emergency care. For example, call if:  · You passed out (lost consciousness). · You have sudden, severe pain in your belly or pelvis. Call your doctor now or seek immediate medical care if:  · You have severe vaginal bleeding. You are soaking through a pad each hour for 2 or more hours. · You are dizzy or lightheaded, or you feel like you may faint. · You have a fever. · You have new belly or pelvic pain. · You have vaginal discharge that smells bad. · You feel weak and tired, and your skin is pale. · Your bleeding gets worse. Watch closely for changes in your health, and be sure to contact your doctor if:  · You do not get better as expected. Where can you learn more? Go to [email or web address] and sign in to your [employer] account. Enter N304 in the Wealink.com box to learn more about \"Vaginal Bleeding After Menopause: Care Instructions. \"     If you do not have an account, please click on the \"Sign Up Now\" link.   Current as of: October 13, 2016  Content Version: 11.3  © 1315-5620 Healthwise, Incorporated. Care instructions adapted under license by [facility] ([facility]). If you have questions about a medical condition or this instruction, always ask your healthcare professional. [address] any warranty or liability for your use of this information.

## 2017-10-05 ENCOUNTER — OFFICE VISIT (OUTPATIENT)
Dept: OBGYN | Age: 72
End: 2017-10-05

## 2017-10-05 VITALS
HEIGHT: 62 IN | SYSTOLIC BLOOD PRESSURE: 128 MMHG | DIASTOLIC BLOOD PRESSURE: 84 MMHG | WEIGHT: 271 LBS | BODY MASS INDEX: 49.87 KG/M2

## 2017-10-05 DIAGNOSIS — R32 URINARY INCONTINENCE, UNSPECIFIED TYPE: ICD-10-CM

## 2017-10-05 DIAGNOSIS — Z90.710 HX OF HYSTERECTOMY, TOTAL: ICD-10-CM

## 2017-10-05 DIAGNOSIS — N93.9 ABNORMAL VAGINAL BLEEDING: Primary | ICD-10-CM

## 2017-10-05 LAB — URINE CULTURE, ROUTINE: NORMAL

## 2017-10-05 PROCEDURE — 3014F SCREEN MAMMO DOC REV: CPT | Performed by: OBSTETRICS & GYNECOLOGY

## 2017-10-05 PROCEDURE — G8417 CALC BMI ABV UP PARAM F/U: HCPCS | Performed by: OBSTETRICS & GYNECOLOGY

## 2017-10-05 PROCEDURE — 99204 OFFICE O/P NEW MOD 45 MIN: CPT | Performed by: OBSTETRICS & GYNECOLOGY

## 2017-10-05 PROCEDURE — G8484 FLU IMMUNIZE NO ADMIN: HCPCS | Performed by: OBSTETRICS & GYNECOLOGY

## 2017-10-05 PROCEDURE — 3017F COLORECTAL CA SCREEN DOC REV: CPT | Performed by: OBSTETRICS & GYNECOLOGY

## 2017-10-05 PROCEDURE — 1036F TOBACCO NON-USER: CPT | Performed by: OBSTETRICS & GYNECOLOGY

## 2017-10-05 PROCEDURE — 1123F ACP DISCUSS/DSCN MKR DOCD: CPT | Performed by: OBSTETRICS & GYNECOLOGY

## 2017-10-05 PROCEDURE — 1090F PRES/ABSN URINE INCON ASSESS: CPT | Performed by: OBSTETRICS & GYNECOLOGY

## 2017-10-05 PROCEDURE — G8399 PT W/DXA RESULTS DOCUMENT: HCPCS | Performed by: OBSTETRICS & GYNECOLOGY

## 2017-10-05 PROCEDURE — 0509F URINE INCON PLAN DOCD: CPT | Performed by: OBSTETRICS & GYNECOLOGY

## 2017-10-05 PROCEDURE — G8427 DOCREV CUR MEDS BY ELIG CLIN: HCPCS | Performed by: OBSTETRICS & GYNECOLOGY

## 2017-10-05 PROCEDURE — 4040F PNEUMOC VAC/ADMIN/RCVD: CPT | Performed by: OBSTETRICS & GYNECOLOGY

## 2017-10-05 RX ORDER — ESTRADIOL 0.1 MG/G
1 CREAM VAGINAL
Qty: 1 TUBE | Refills: 3 | Status: SHIPPED | OUTPATIENT
Start: 2017-10-05 | End: 2019-01-11 | Stop reason: ALTCHOICE

## 2017-10-05 RX ORDER — CLINDAMYCIN PHOSPHATE 20 MG/G
CREAM VAGINAL
Qty: 1 TUBE | Refills: 0 | Status: SHIPPED | OUTPATIENT
Start: 2017-10-05 | End: 2018-01-12 | Stop reason: ALTCHOICE

## 2017-10-05 RX ORDER — ESTRADIOL 0.1 MG/G
CREAM VAGINAL
Qty: 2 TUBE | Refills: 0
Start: 2017-10-05 | End: 2018-01-12 | Stop reason: SDUPTHER

## 2017-10-05 NOTE — PROGRESS NOTES
SUBJECTIVE:   80 y.o. [id number] female complains of irregular vaginal bleeding on and off for the last 5 months, she does not have a lot of bleeding usually just when she wipes after using the bathroom, as well as a few spots on her underwear, she does not have a uterus she had a hysterectomy in 1993, she is not currently sexually active. She has not had a pap in 20+ years. Review of Systems:  General ROS: negative  Psychological ROS: negative  ENT ROS: negative  Endocrine ROS: negative  Respiratory ROS: no cough, shortness of breath, or wheezing  Cardiovascular ROS: no chest pain or dyspnea on exertion  Gastrointestinal ROS: no abdominal pain, change in bowel habits, or black or bloody stools  Genito-Urinary ROS: no dysuria, trouble voiding, or hematuria  Musculoskeletal ROS: negative  Neurological ROS: no TIA or stroke symptoms  Dermatological ROS: negative    OBJECTIVE:   /84  Ht 5' 2\" (1.575 m)  Wt 271 lb (122.9 kg)  LMP 10/01/1993  Breastfeeding? No  BMI 49.57 kg/m2    Past Medical History:   Diagnosis Date    Anxiety     Depression     Generalized anxiety disorder     Hyperlipidemia     Hypertension     Hypothyroidism     Osteoarthritis     Vitamin D deficiency                                                                    Past Surgical History:   Procedure Laterality Date    CYST REMOVAL      CERVICAL    HYSTERECTOMY  1993    TONSILLECTOMY AND ADENOIDECTOMY      TOTAL KNEE ARTHROPLASTY      RIGHT     Family History   Problem Relation Age of Onset    Heart Disease Father      Social History     Social History    Marital status:      Spouse name: N/A    Number of children: N/A    Years of education: N/A     Occupational History    Not on file.      Social History Main Topics    Smoking status: Former Smoker     Types: Cigarettes     Quit date: 1/1/1970    Smokeless tobacco: Never Used    Alcohol use Yes      Comment: rare     Drug use: No    Sexual activity: Not on file     Other Topics Concern    Not on file     Social History Narrative         Physical Exam:  CONSTITUTIONAL: She appears well nourished and developed   NEUROLOGIC: Alert and oriented to time, place and person  NECK: no thyroidmegaly  LUNGS: Clear to ascultation bilaterally  CVS: regular rate and rhythm  LYMPHATIC: No palpable lymph nodes  ABDOMEN: benign, soft, nontender, no masses. No liver or splenic organomegaly. No evidence of abdominal or inguinal hernia. No indication for occult blood testing  SKIN: normal texture and tone, no lesions  NEURO: normal tone, no hyperreflexia, 1+DTRs throughout    Pelvic Exam:   EFG: normal external genitalia  URETHRAL MEATUS: normal size, no diverticula   URETHRA: normal appearing without diverticula or lesions  BLADDER:  No masses or tenderness +1-2 cystocele   VAGINA: normal rugae, no discharge   CERVIX: parous, no lesions  UTERUS: uterus is normal size, shape, consistency and nontender   ADNEXA: normal adnexa in size, nontender and no masses. PERINEUM: normal appearing without lesions or masses  ANUS: normal appearing without lesions or masses  RECTUM: no hemorrhoids or rectal masses. ASSESSMENT:   1. Abnormal vaginal bleeding  Miscellaneous lab test #3    [facility] NON OB TRANSVAGINAL   2. Hx of hysterectomy, total     3. Cystocele, unspecified cystocele location     4. ATROPHIC VAGINITIS      PLAN:   Past medical, social and family history reviewed and updated in pt's chart. Surgical options vs. More conservative options discussed (pessary)  E2 CREAM FOR TX    I, Dr Abilio Garcia, personally performed the services described in this documentation, as scribed by Flynn Leon in my presence, and it is both accurate and complete.  Electronically signed by: Abilio Garcia MD 10/6/17 8:00 PM

## 2017-10-05 NOTE — MR AVS SNAPSHOT
After Visit Summary             Liat Schirmer KEAGAN/ [address] Ng [address]   10/5/2017 9:40 AM   Office Visit    Description:  Female : 1945   Provider:  Lenin Montano MD   Department:  [facility]              Your Follow-Up and Future Appointments         Below is a list of your follow-up and future appointments. This may not be a complete list as you may have made appointments directly with providers that we are not aware of or your providers may have made some for you. Please call your providers to confirm appointments. It is important to keep your appointments. Please bring your current insurance card, photo ID, co-pay, and all medication bottles to your appointment. If self-pay, payment is expected at the time of service. Your To-Do List     Future Appointments Provider Department Dept Phone    2018 8:40 AM MD Lan Huang [phone]    2018 9:00 AM Lucinda Kat MD [facility] [phone]    Please arrive 15 minutes prior to appointment, bring photo ID and insurance card. Future Orders Complete By Expires    Miscellaneous lab test #3 [[id number] Custom]  10/5/2017 10/5/2018    Comments:    Genital cultures    Follow-Up    Return in about 3 months (around 2018) for annual.         Information from Your Visit        Department     Name Address Phone Fax    [facility] [address] [phone]      You Were Seen for:         Comments    Abnormal vaginal bleeding   [[id number]]         Vital Signs     Blood Pressure Height Weight Last Menstrual Period Breastfeeding? Body Mass Index    128/84 5' 2\" (1.575 m) 271 lb (122.9 kg) 10/01/1993 No 49.57 kg/m2    Smoking Status                   Former Smoker           Additional Information about your Body Mass Index (BMI)           Your BMI as listed above is considered obese (30 or more).  BMI is an estimate of body fat, calculated from your height and weight. The higher your BMI, the greater your risk of heart disease, high blood pressure, type 2 diabetes, stroke, gallstones, arthritis, sleep apnea, and certain cancers. BMI is not perfect. It may overestimate body fat in athletes and people who are more muscular. Even a small weight loss (between 5 and 10 percent of your current weight) by decreasing your calorie intake and becoming more physically active will help lower your risk of developing or worsening diseases associated with obesity. Learn more at: Big Contacts.uk             Today's Medication Changes          These changes are accurate as of: 10/5/17 10:26 AM.  If you have any questions, ask your nurse or doctor. START taking these medications           clindamycin 2 % vaginal cream   Commonly known as:  CLEOCIN   Instructions:  Place vaginally nightly for 5 nights.    Quantity:  1 Tube   Refills:  0   Started by:  Christine Magallanes MD       estradiol 0.1 MG/GM vaginal cream   Commonly known as:  ESTRACE VAGINAL   Instructions:  Place 1 g vaginally Twice a Week   Quantity:  1 Tube   Refills:  3   Started by:  Christine Magallanes MD            Where to Get Your Medications      These medications were sent to [address] [place] [address], [address] [phone] Krupa Stewart [phone]  [address] [place] [phone]    Hours:  24-hours Phone:  [phone]     clindamycin 2 % vaginal cream    estradiol 0.1 MG/GM vaginal cream               Your Current Medications Are              clindamycin (CLEOCIN) 2 % vaginal cream Place vaginally nightly for 5 nights.    estradiol (ESTRACE VAGINAL) 0.1 MG/GM vaginal cream Place 1 g vaginally Twice a Week    levothyroxine (SYNTHROID) 88 MCG tablet TK 2 TS PO QD    vitamin B-12 (CYANOCOBALAMIN) 100 MCG tablet Take 100 mcg by mouth daily vitamin D (CHOLECALCIFEROL) 1000 UNIT TABS tablet Take 1,000 Units by mouth daily    omeprazole (PRILOSEC) 40 MG delayed release capsule Take 1 capsule by mouth daily    meloxicam (MOBIC) 15 MG tablet Take 1 tablet by mouth daily    lisinopril-hydrochlorothiazide (PRINZIDE;ZESTORETIC) 20-25 MG per tablet Take 1 tablet by mouth daily    levothyroxine (SYNTHROID) 175 MCG tablet Take 1 tablet by mouth daily    lovastatin (MEVACOR) 40 MG tablet TAKE 1 TABLET BY MOUTH EVERY DAY    L-Methylfolate (DEPLIN) 7.5 MG TABS Take 1 tablet by mouth daily    ARIPiprazole (ABILIFY) 2 MG tablet Take 2 mg by mouth daily     PARoxetine (PAXIL) 40 MG tablet Take 40 mg by mouth every morning     Calcium Carbonate-Vitamin D (CALCIUM + D) 600-200 MG-UNIT TABS Take 1 tablet by mouth daily. multivitamin (THERAGRAN) per tablet Take 1 tablet by mouth daily. Misc Natural Products (GLUCOSAMINE CHOND COMPLEX/MSM PO) Take 1 tablet by mouth daily. Allergies              Avelox [Moxifloxacin Hcl In Nacl]     Bactrim     Macrobid [Nitrofurantoin Monohydrate Macrocrystals]       We Ordered/Performed the following           Ambulatory referral to Occupational Therapy     Scheduling Instructions:    Pelvic floor PT    Comments: The patient can be scheduled with any member of the group, including the provider with the first available appointments.           Additional Information        Basic Information     Date Of Birth Sex Race Ethnicity Preferred Language    1945 Female White Non-/Non  English      Problem List as of 10/5/2017  Date Reviewed: 10/3/2017                Hypothyroidism    Morbid obesity with BMI of 50.0-59.9, adult ([facility]    Essential hypertension    Recurrent major depressive disorder, in full remission ([facility]    CKD (chronic kidney disease) stage 3, GFR 30-59 ml/min    Hypertensive renal disease    Hyperlipidemia    Osteoarthritis    Vitamin D deficiency    Depression Generalized anxiety disorder      Immunizations as of 10/5/2017     Name Date    Influenza Virus Vaccine 9/24/2014    Influenza, High Dose 9/12/2017, 9/9/2016, 9/29/2015    Pneumococcal 13-valent Conjugate (Albino Summers) 1/8/2016, 9/29/2015    Pneumococcal Polysaccharide ([id number]) 8/25/2014      Preventive Care        Date Due    Tetanus Combination Vaccine (1 - Tdap) 9/9/2018 (Originally 10/22/1965)    Hepatitis C screening is recommended for all adults regardless of risk factors born between [facility] at least once (lifetime) who have never been tested. 9/9/2018 (Originally 1945)    Mammograms are recommended every 2 years for low/average risk patients aged 48 - 69, and every year for high risk patients per updated national guidelines. However these guidelines can be individualized by your provider. 9/28/2019    Cholesterol Screening 8/15/2022    Colonoscopy 3/8/2027            [employer] Signup           Our records indicate that you have an active [employer] account. You can view your After Visit Summary by going to [email or web address] and logging in with your [employer] username and password. If you don't have a [employer] username and password but a parent or guardian has access to your record, the parent or guardian should login with their own [employer] username and password and access your record to view the After Visit Summary. Additional Information  If you have questions, please contact the physician practice where you receive care. Remember, [employer] is NOT to be used for urgent needs. For medical emergencies, dial 911. For questions regarding your [employer] account call [phone]. If you have a clinical question, please call your doctor's office.

## 2017-10-10 DIAGNOSIS — N93.9 ABNORMAL VAGINAL BLEEDING: ICD-10-CM

## 2017-10-11 ENCOUNTER — TELEPHONE (OUTPATIENT)
Dept: OBGYN | Age: 72
End: 2017-10-11

## 2017-10-11 RX ORDER — CLINDAMYCIN PHOSPHATE 20 MG/G
CREAM VAGINAL
Qty: 1 TUBE | Refills: 0 | Status: CANCELLED | OUTPATIENT
Start: 2017-10-11

## 2017-10-11 NOTE — TELEPHONE ENCOUNTER
lmom for patient to call office back regarding results. Please inform pt that sti panel was neg for infection but POS for BV.       clindesse not covered by pts ins, alternative rx x has been pended.

## 2017-10-12 NOTE — TELEPHONE ENCOUNTER
No, if patient has been treated. Spoke with patient in regards to this. Informed her she does not need medication.

## 2017-12-12 RX ORDER — LOVASTATIN 40 MG/1
TABLET ORAL
Qty: 90 TABLET | Refills: 3 | Status: SHIPPED | OUTPATIENT
Start: 2017-12-12 | End: 2018-11-26 | Stop reason: SDUPTHER

## 2017-12-28 LAB
ALBUMIN SERPL-MCNC: 4.5 G/DL
ALP BLD-CCNC: 87 U/L
ALT SERPL-CCNC: 15 U/L
ANION GAP SERPL CALCULATED.3IONS-SCNC: NORMAL MMOL/L
AST SERPL-CCNC: 27 U/L
BASOPHILS ABSOLUTE: 0.1 /ΜL
BASOPHILS RELATIVE PERCENT: 1 %
BILIRUB SERPL-MCNC: 0.2 MG/DL (ref 0.1–1.4)
BUN BLDV-MCNC: 36 MG/DL
CALCIUM SERPL-MCNC: 10.1 MG/DL
CHLORIDE BLD-SCNC: 100 MMOL/L
CHOLESTEROL, TOTAL: 200 MG/DL
CHOLESTEROL/HDL RATIO: ABNORMAL
CO2: 22 MMOL/L
CREAT SERPL-MCNC: 1.13 MG/DL
EOSINOPHILS ABSOLUTE: 0.3 /ΜL
EOSINOPHILS RELATIVE PERCENT: 5 %
GFR CALCULATED: 49
GLUCOSE BLD-MCNC: 92 MG/DL
HCT VFR BLD CALC: 38.9 % (ref 36–46)
HDLC SERPL-MCNC: 91 MG/DL (ref 35–70)
HEMOGLOBIN: 12.3 G/DL (ref 12–16)
LDL CHOLESTEROL CALCULATED: 82 MG/DL (ref 0–160)
LYMPHOCYTES ABSOLUTE: 2.4 /ΜL
LYMPHOCYTES RELATIVE PERCENT: 38 %
MCH RBC QN AUTO: 27.2 PG
MCHC RBC AUTO-ENTMCNC: 31.6 G/DL
MCV RBC AUTO: 86 FL
MONOCYTES ABSOLUTE: 0.4 /ΜL
MONOCYTES RELATIVE PERCENT: 7 %
NEUTROPHILS ABSOLUTE: 3.2 /ΜL
NEUTROPHILS RELATIVE PERCENT: 49 %
PDW BLD-RTO: 15 %
PLATELET # BLD: 362 K/ΜL
PMV BLD AUTO: NORMAL FL
POTASSIUM SERPL-SCNC: 5.2 MMOL/L
RBC # BLD: 4.53 10^6/ΜL
SODIUM BLD-SCNC: 140 MMOL/L
TOTAL PROTEIN: 7.3
TRIGL SERPL-MCNC: 133 MG/DL
VLDLC SERPL CALC-MCNC: 27 MG/DL
WBC # BLD: 6.3 10^3/ML

## 2017-12-29 DIAGNOSIS — I10 ESSENTIAL HYPERTENSION: ICD-10-CM

## 2017-12-29 DIAGNOSIS — E78.2 MIXED HYPERLIPIDEMIA: ICD-10-CM

## 2018-01-09 ENCOUNTER — OFFICE VISIT (OUTPATIENT)
Dept: OBGYN | Age: 73
End: 2018-01-09

## 2018-01-09 VITALS
BODY MASS INDEX: 49.94 KG/M2 | HEIGHT: 62 IN | WEIGHT: 271.4 LBS | SYSTOLIC BLOOD PRESSURE: 130 MMHG | DIASTOLIC BLOOD PRESSURE: 70 MMHG

## 2018-01-09 DIAGNOSIS — Z01.419 WELL WOMAN EXAM WITH ROUTINE GYNECOLOGICAL EXAM: Primary | ICD-10-CM

## 2018-01-09 DIAGNOSIS — Z11.51 SCREENING FOR HPV (HUMAN PAPILLOMAVIRUS): ICD-10-CM

## 2018-01-09 DIAGNOSIS — Z12.31 SCREENING MAMMOGRAM, ENCOUNTER FOR: ICD-10-CM

## 2018-01-09 PROCEDURE — G0101 CA SCREEN;PELVIC/BREAST EXAM: HCPCS | Performed by: OBSTETRICS & GYNECOLOGY

## 2018-01-10 LAB — PAP SMEAR: ABNORMAL

## 2018-01-12 ENCOUNTER — OFFICE VISIT (OUTPATIENT)
Dept: FAMILY MEDICINE CLINIC | Age: 73
End: 2018-01-12

## 2018-01-12 VITALS
DIASTOLIC BLOOD PRESSURE: 70 MMHG | HEART RATE: 84 BPM | WEIGHT: 271.6 LBS | RESPIRATION RATE: 18 BRPM | HEIGHT: 62 IN | BODY MASS INDEX: 49.98 KG/M2 | SYSTOLIC BLOOD PRESSURE: 118 MMHG | TEMPERATURE: 98.4 F

## 2018-01-12 DIAGNOSIS — I10 ESSENTIAL HYPERTENSION: Primary | ICD-10-CM

## 2018-01-12 DIAGNOSIS — F33.42 RECURRENT MAJOR DEPRESSIVE EPISODES, IN FULL REMISSION (HCC): ICD-10-CM

## 2018-01-12 DIAGNOSIS — E03.9 ACQUIRED HYPOTHYROIDISM: ICD-10-CM

## 2018-01-12 DIAGNOSIS — F41.1 GENERALIZED ANXIETY DISORDER: ICD-10-CM

## 2018-01-12 DIAGNOSIS — E78.2 MIXED HYPERLIPIDEMIA: ICD-10-CM

## 2018-01-12 DIAGNOSIS — E66.01 MORBID OBESITY WITH BMI OF 45.0-49.9, ADULT (HCC): ICD-10-CM

## 2018-01-12 DIAGNOSIS — N18.30 CKD (CHRONIC KIDNEY DISEASE) STAGE 3, GFR 30-59 ML/MIN (HCC): ICD-10-CM

## 2018-01-12 PROCEDURE — 1123F ACP DISCUSS/DSCN MKR DOCD: CPT | Performed by: FAMILY MEDICINE

## 2018-01-12 PROCEDURE — G8399 PT W/DXA RESULTS DOCUMENT: HCPCS | Performed by: FAMILY MEDICINE

## 2018-01-12 PROCEDURE — G8484 FLU IMMUNIZE NO ADMIN: HCPCS | Performed by: FAMILY MEDICINE

## 2018-01-12 PROCEDURE — 3017F COLORECTAL CA SCREEN DOC REV: CPT | Performed by: FAMILY MEDICINE

## 2018-01-12 PROCEDURE — 3014F SCREEN MAMMO DOC REV: CPT | Performed by: FAMILY MEDICINE

## 2018-01-12 PROCEDURE — 1090F PRES/ABSN URINE INCON ASSESS: CPT | Performed by: FAMILY MEDICINE

## 2018-01-12 PROCEDURE — G8510 SCR DEP NEG, NO PLAN REQD: HCPCS | Performed by: FAMILY MEDICINE

## 2018-01-12 PROCEDURE — 1036F TOBACCO NON-USER: CPT | Performed by: FAMILY MEDICINE

## 2018-01-12 PROCEDURE — G8417 CALC BMI ABV UP PARAM F/U: HCPCS | Performed by: FAMILY MEDICINE

## 2018-01-12 PROCEDURE — 4040F PNEUMOC VAC/ADMIN/RCVD: CPT | Performed by: FAMILY MEDICINE

## 2018-01-12 PROCEDURE — G8427 DOCREV CUR MEDS BY ELIG CLIN: HCPCS | Performed by: FAMILY MEDICINE

## 2018-01-12 PROCEDURE — 3288F FALL RISK ASSESSMENT DOCD: CPT | Performed by: FAMILY MEDICINE

## 2018-01-12 PROCEDURE — 99214 OFFICE O/P EST MOD 30 MIN: CPT | Performed by: FAMILY MEDICINE

## 2018-01-12 RX ORDER — MELOXICAM 15 MG/1
15 TABLET ORAL DAILY
Qty: 90 TABLET | Refills: 2 | Status: CANCELLED | OUTPATIENT
Start: 2018-01-12

## 2018-01-12 RX ORDER — CLINDAMYCIN PHOSPHATE 20 MG/G
CREAM VAGINAL
Refills: 0 | COMMUNITY
Start: 2017-10-05 | End: 2018-05-11 | Stop reason: ALTCHOICE

## 2018-01-12 RX ORDER — LISINOPRIL AND HYDROCHLOROTHIAZIDE 25; 20 MG/1; MG/1
1 TABLET ORAL DAILY
Qty: 90 TABLET | Refills: 2 | Status: SHIPPED | OUTPATIENT
Start: 2018-01-12 | End: 2018-09-11 | Stop reason: SDUPTHER

## 2018-01-12 RX ORDER — CLOBETASOL PROPIONATE 0.5 MG/G
OINTMENT TOPICAL
Qty: 15 G | Refills: 0 | Status: SHIPPED | OUTPATIENT
Start: 2018-01-12 | End: 2020-02-20

## 2018-01-12 RX ORDER — OMEPRAZOLE 40 MG/1
40 CAPSULE, DELAYED RELEASE ORAL DAILY
Qty: 90 CAPSULE | Refills: 2 | Status: CANCELLED | OUTPATIENT
Start: 2018-01-12

## 2018-01-12 RX ORDER — LEVOTHYROXINE SODIUM 88 UG/1
TABLET ORAL
Qty: 160 TABLET | Refills: 1 | COMMUNITY
Start: 2018-01-12 | End: 2018-09-11 | Stop reason: DRUGHIGH

## 2018-01-12 NOTE — PROGRESS NOTES
Future     Standing Expiration Date:   1/12/2019    TSH without Reflex     Standing Status:   Future     Standing Expiration Date:   1/12/2019    T4, Free     Standing Status:   Future     Standing Expiration Date:   1/13/2019     Outpatient Encounter Prescriptions as of 1/12/2018   Medication Sig Dispense Refill    lisinopril-hydrochlorothiazide (PRINZIDE;ZESTORETIC) 20-25 MG per tablet Take 1 tablet by mouth daily 90 tablet 2    levothyroxine (SYNTHROID) 88 MCG tablet 2 tablets daily except wednessday 160 tablet 1    clobetasol (TEMOVATE) 0.05 % ointment Apply topically 2 times daily. 15 g 0    lovastatin (MEVACOR) 40 MG tablet TAKE 1 TABLET BY MOUTH EVERY DAY 90 tablet 3    estradiol (ESTRACE VAGINAL) 0.1 MG/GM vaginal cream Place 1 g vaginally Twice a Week 1 Tube 3    vitamin B-12 (CYANOCOBALAMIN) 100 MCG tablet Take 100 mcg by mouth daily      vitamin D (CHOLECALCIFEROL) 1000 UNIT TABS tablet Take 1,000 Units by mouth daily      L-Methylfolate (DEPLIN) 7.5 MG TABS Take 1 tablet by mouth daily      ARIPiprazole (ABILIFY) 2 MG tablet Take 2 mg by mouth daily       PARoxetine (PAXIL) 40 MG tablet Take 40 mg by mouth every morning       Calcium Carbonate-Vitamin D (CALCIUM + D) 600-200 MG-UNIT TABS Take 1 tablet by mouth daily.  multivitamin (THERAGRAN) per tablet Take 1 tablet by mouth daily.  Misc Natural Products (GLUCOSAMINE CHOND COMPLEX/MSM PO) Take 1 tablet by mouth daily.  clindamycin (CLEOCIN) 2 % vaginal cream PLACE VAGINALLY Q NIGHT FOR 5 NTS  0    [DISCONTINUED] clindamycin (CLEOCIN) 2 % vaginal cream Place vaginally nightly for 5 nights.  1 Tube 0    [DISCONTINUED] estradiol (ESTRACE VAGINAL) 0.1 MG/GM vaginal cream Lot [id number] Exp 1/20 - Sample given to patient 2 Tube 0    [DISCONTINUED] levothyroxine (SYNTHROID) 88 MCG tablet TK 2 TS PO QD  0    [DISCONTINUED] omeprazole (PRILOSEC) 40 MG delayed release capsule Take 1 capsule by mouth daily 90 capsule 2    [DISCONTINUED] meloxicam (MOBIC) 15 MG tablet Take 1 tablet by mouth daily 90 tablet 2    [DISCONTINUED] lisinopril-hydrochlorothiazide (PRINZIDE;ZESTORETIC) 20-25 MG per tablet Take 1 tablet by mouth daily 90 tablet 2    [DISCONTINUED] levothyroxine (SYNTHROID) 175 MCG tablet Take 1 tablet by mouth daily 90 tablet 2     No facility-administered encounter medications on file as of 1/12/2018. The nature of cardiac risk has been fully discussed with this patient. I have made her aware of her LDL target goal given her cardiovascular risk analysis. I have discussed the appropriate diet. The need for lifelong compliance in order to reduce risk is stressed. A regular exercise program is recommended to help achieve and maintain normal body weight, fitness and improve lipid balance. A written copy of a low fat, low cholesterol diet has been given to the patient. Return in about 4 months (around 5/12/2018). Quality & Risk Score Accuracy - MEDICARE ADVANTAGE    Visit Dx:  Recurrent major depressive episodes, in full remission ([address]  Stable/controlled based upon review of recent symptoms. Continue current treatment plan and follow up at least yearly. Visit Dx:  Morbid obesity with BMI of 45.0-49.9, adult ([address]  Stable on review of most recent BMI. Patient counseled on diet and exercise. Additional documentation:  Based on review of the following: The BMI is 49.68 kg/m2.   Last edited 01/12/18 09:59 EST by Amena Beck MD

## 2018-01-16 ENCOUNTER — OFFICE VISIT (OUTPATIENT)
Dept: FAMILY MEDICINE CLINIC | Age: 73
End: 2018-01-16

## 2018-01-16 VITALS
HEART RATE: 74 BPM | TEMPERATURE: 97.6 F | SYSTOLIC BLOOD PRESSURE: 118 MMHG | RESPIRATION RATE: 18 BRPM | WEIGHT: 270 LBS | DIASTOLIC BLOOD PRESSURE: 76 MMHG | HEIGHT: 62 IN | BODY MASS INDEX: 49.69 KG/M2

## 2018-01-16 DIAGNOSIS — R30.0 DYSURIA: ICD-10-CM

## 2018-01-16 DIAGNOSIS — N30.00 ACUTE CYSTITIS WITHOUT HEMATURIA: Primary | ICD-10-CM

## 2018-01-16 LAB
BILIRUBIN, POC: ABNORMAL
BLOOD URINE, POC: ABNORMAL
CLARITY, POC: CLEAR
COLOR, POC: YELLOW
GLUCOSE URINE, POC: ABNORMAL
KETONES, POC: ABNORMAL
LEUKOCYTE EST, POC: ABNORMAL
NITRITE, POC: ABNORMAL
PH, POC: 6
PROTEIN, POC: ABNORMAL
SPECIFIC GRAVITY, POC: 1030
UROBILINOGEN, POC: ABNORMAL

## 2018-01-16 PROCEDURE — G8417 CALC BMI ABV UP PARAM F/U: HCPCS | Performed by: NURSE PRACTITIONER

## 2018-01-16 PROCEDURE — 3014F SCREEN MAMMO DOC REV: CPT | Performed by: NURSE PRACTITIONER

## 2018-01-16 PROCEDURE — 1036F TOBACCO NON-USER: CPT | Performed by: NURSE PRACTITIONER

## 2018-01-16 PROCEDURE — G8484 FLU IMMUNIZE NO ADMIN: HCPCS | Performed by: NURSE PRACTITIONER

## 2018-01-16 PROCEDURE — 1123F ACP DISCUSS/DSCN MKR DOCD: CPT | Performed by: NURSE PRACTITIONER

## 2018-01-16 PROCEDURE — G8427 DOCREV CUR MEDS BY ELIG CLIN: HCPCS | Performed by: NURSE PRACTITIONER

## 2018-01-16 PROCEDURE — G8399 PT W/DXA RESULTS DOCUMENT: HCPCS | Performed by: NURSE PRACTITIONER

## 2018-01-16 PROCEDURE — 3017F COLORECTAL CA SCREEN DOC REV: CPT | Performed by: NURSE PRACTITIONER

## 2018-01-16 PROCEDURE — 99213 OFFICE O/P EST LOW 20 MIN: CPT | Performed by: NURSE PRACTITIONER

## 2018-01-16 PROCEDURE — 4040F PNEUMOC VAC/ADMIN/RCVD: CPT | Performed by: NURSE PRACTITIONER

## 2018-01-16 PROCEDURE — 81003 URINALYSIS AUTO W/O SCOPE: CPT | Performed by: NURSE PRACTITIONER

## 2018-01-16 PROCEDURE — 1090F PRES/ABSN URINE INCON ASSESS: CPT | Performed by: NURSE PRACTITIONER

## 2018-01-16 RX ORDER — CIPROFLOXACIN 500 MG/1
500 TABLET, FILM COATED ORAL 2 TIMES DAILY
Qty: 10 TABLET | Refills: 0 | Status: SHIPPED | OUTPATIENT
Start: 2018-01-16 | End: 2018-08-02 | Stop reason: SDUPTHER

## 2018-01-16 ASSESSMENT — ENCOUNTER SYMPTOMS
NAUSEA: 0
VOMITING: 0

## 2018-01-17 NOTE — PROGRESS NOTES
treatment results and for coordination of care. I have reviewed the patient's medical history in detail and updated the computerized patient record.     Gm Agarwal NP

## 2018-01-19 LAB
ORGANISM: ABNORMAL
URINE CULTURE, ROUTINE: ABNORMAL
URINE CULTURE, ROUTINE: ABNORMAL

## 2018-01-22 DIAGNOSIS — Z01.419 WELL WOMAN EXAM WITH ROUTINE GYNECOLOGICAL EXAM: ICD-10-CM

## 2018-01-22 DIAGNOSIS — Z11.51 SCREENING FOR HPV (HUMAN PAPILLOMAVIRUS): ICD-10-CM

## 2018-02-16 ENCOUNTER — TELEPHONE (OUTPATIENT)
Dept: OBGYN CLINIC | Age: 73
End: 2018-02-16

## 2018-04-24 LAB
ALBUMIN SERPL-MCNC: 4 G/DL
ALP BLD-CCNC: 78 U/L
ALT SERPL-CCNC: 15 U/L
ANION GAP SERPL CALCULATED.3IONS-SCNC: NORMAL MMOL/L
AST SERPL-CCNC: 20 U/L
BASOPHILS ABSOLUTE: 0.1 /ΜL
BASOPHILS RELATIVE PERCENT: 1 %
BILIRUB SERPL-MCNC: 0.3 MG/DL (ref 0.1–1.4)
BUN BLDV-MCNC: 19 MG/DL
CALCIUM SERPL-MCNC: 10 MG/DL
CHLORIDE BLD-SCNC: 103 MMOL/L
CHOLESTEROL, TOTAL: 179 MG/DL
CHOLESTEROL/HDL RATIO: ABNORMAL
CO2: 19 MMOL/L
CREAT SERPL-MCNC: 0.91 MG/DL
EOSINOPHILS ABSOLUTE: 0.4 /ΜL
EOSINOPHILS RELATIVE PERCENT: 5 %
GFR CALCULATED: NORMAL
GLUCOSE BLD-MCNC: 92 MG/DL
HCT VFR BLD CALC: 38.4 % (ref 36–46)
HDLC SERPL-MCNC: 85 MG/DL (ref 35–70)
HEMOGLOBIN: 12.7 G/DL (ref 12–16)
LDL CHOLESTEROL CALCULATED: 67 MG/DL (ref 0–160)
LYMPHOCYTES ABSOLUTE: 2.9 /ΜL
LYMPHOCYTES RELATIVE PERCENT: 38 %
MCH RBC QN AUTO: 28.3 PG
MCHC RBC AUTO-ENTMCNC: 33.1 G/DL
MCV RBC AUTO: 86 FL
MONOCYTES ABSOLUTE: 0.4 /ΜL
MONOCYTES RELATIVE PERCENT: 6 %
NEUTROPHILS ABSOLUTE: 3.9 /ΜL
NEUTROPHILS RELATIVE PERCENT: 50 %
PDW BLD-RTO: 14.9 %
PLATELET # BLD: 325 K/ΜL
PMV BLD AUTO: NORMAL FL
POTASSIUM SERPL-SCNC: 4.5 MMOL/L
RBC # BLD: 4.48 10^6/ΜL
SODIUM BLD-SCNC: 141 MMOL/L
T4 FREE: 1.1
TOTAL PROTEIN: 6.9
TRIGL SERPL-MCNC: 137 MG/DL
TSH SERPL DL<=0.05 MIU/L-ACNC: 1.82 UIU/ML
VLDLC SERPL CALC-MCNC: 27 MG/DL
WBC # BLD: 7.7 10^3/ML

## 2018-04-27 DIAGNOSIS — E03.9 ACQUIRED HYPOTHYROIDISM: ICD-10-CM

## 2018-04-27 DIAGNOSIS — N18.30 CKD (CHRONIC KIDNEY DISEASE) STAGE 3, GFR 30-59 ML/MIN (HCC): ICD-10-CM

## 2018-04-27 DIAGNOSIS — I10 ESSENTIAL HYPERTENSION: ICD-10-CM

## 2018-05-11 ENCOUNTER — OFFICE VISIT (OUTPATIENT)
Dept: FAMILY MEDICINE CLINIC | Age: 73
End: 2018-05-11
Payer: MEDICARE

## 2018-05-11 VITALS
SYSTOLIC BLOOD PRESSURE: 110 MMHG | HEIGHT: 62 IN | TEMPERATURE: 97.9 F | BODY MASS INDEX: 48.76 KG/M2 | HEART RATE: 80 BPM | RESPIRATION RATE: 20 BRPM | DIASTOLIC BLOOD PRESSURE: 72 MMHG | WEIGHT: 265 LBS

## 2018-05-11 DIAGNOSIS — I10 ESSENTIAL HYPERTENSION: Primary | ICD-10-CM

## 2018-05-11 DIAGNOSIS — E03.9 ACQUIRED HYPOTHYROIDISM: ICD-10-CM

## 2018-05-11 DIAGNOSIS — F41.1 GENERALIZED ANXIETY DISORDER: ICD-10-CM

## 2018-05-11 DIAGNOSIS — N18.30 CKD (CHRONIC KIDNEY DISEASE) STAGE 3, GFR 30-59 ML/MIN (HCC): ICD-10-CM

## 2018-05-11 DIAGNOSIS — E66.01 MORBID OBESITY WITH BMI OF 45.0-49.9, ADULT (HCC): ICD-10-CM

## 2018-05-11 DIAGNOSIS — E78.2 MIXED HYPERLIPIDEMIA: ICD-10-CM

## 2018-05-11 PROCEDURE — 4040F PNEUMOC VAC/ADMIN/RCVD: CPT | Performed by: FAMILY MEDICINE

## 2018-05-11 PROCEDURE — G8417 CALC BMI ABV UP PARAM F/U: HCPCS | Performed by: FAMILY MEDICINE

## 2018-05-11 PROCEDURE — G8427 DOCREV CUR MEDS BY ELIG CLIN: HCPCS | Performed by: FAMILY MEDICINE

## 2018-05-11 PROCEDURE — 99214 OFFICE O/P EST MOD 30 MIN: CPT | Performed by: FAMILY MEDICINE

## 2018-05-11 PROCEDURE — 1036F TOBACCO NON-USER: CPT | Performed by: FAMILY MEDICINE

## 2018-05-11 PROCEDURE — 1123F ACP DISCUSS/DSCN MKR DOCD: CPT | Performed by: FAMILY MEDICINE

## 2018-05-11 PROCEDURE — G8399 PT W/DXA RESULTS DOCUMENT: HCPCS | Performed by: FAMILY MEDICINE

## 2018-05-11 PROCEDURE — 3017F COLORECTAL CA SCREEN DOC REV: CPT | Performed by: FAMILY MEDICINE

## 2018-05-11 PROCEDURE — 1090F PRES/ABSN URINE INCON ASSESS: CPT | Performed by: FAMILY MEDICINE

## 2018-05-11 ASSESSMENT — PATIENT HEALTH QUESTIONNAIRE - PHQ9
SUM OF ALL RESPONSES TO PHQ QUESTIONS 1-9: 0
2. FEELING DOWN, DEPRESSED OR HOPELESS: 0
SUM OF ALL RESPONSES TO PHQ9 QUESTIONS 1 & 2: 0
1. LITTLE INTEREST OR PLEASURE IN DOING THINGS: 0

## 2018-07-24 ENCOUNTER — OFFICE VISIT (OUTPATIENT)
Dept: OBGYN CLINIC | Age: 73
End: 2018-07-24
Payer: MEDICARE

## 2018-07-24 VITALS
SYSTOLIC BLOOD PRESSURE: 128 MMHG | WEIGHT: 265 LBS | HEIGHT: 62 IN | BODY MASS INDEX: 48.76 KG/M2 | DIASTOLIC BLOOD PRESSURE: 70 MMHG

## 2018-07-24 DIAGNOSIS — Z01.411 ENCOUNTER FOR GYNECOLOGICAL EXAMINATION WITH ABNORMAL FINDING: ICD-10-CM

## 2018-07-24 DIAGNOSIS — R87.610 ASCUS OF CERVIX WITH NEGATIVE HIGH RISK HPV: Primary | ICD-10-CM

## 2018-07-24 PROCEDURE — 99213 OFFICE O/P EST LOW 20 MIN: CPT | Performed by: OBSTETRICS & GYNECOLOGY

## 2018-07-24 ASSESSMENT — ENCOUNTER SYMPTOMS
WHEEZING: 0
COUGH: 0
SHORTNESS OF BREATH: 0
CONSTIPATION: 0
BLOOD IN STOOL: 0
DIARRHEA: 0
ABDOMINAL PAIN: 0

## 2018-07-24 NOTE — PROGRESS NOTES
status: Former Smoker     Types: Cigarettes     Quit date: 1/1/1970    Smokeless tobacco: Never Used    Alcohol use Yes      Comment: rare     Drug use: No    Sexual activity: Not on file     Other Topics Concern    Not on file     Social History Narrative    No narrative on file         MEDICATIONS:  Current Outpatient Prescriptions on File Prior to Visit   Medication Sig Dispense Refill    levothyroxine (SYNTHROID) 175 MCG tablet TAKE 1 TABLET EVERY DAY 90 tablet 1    lisinopril-hydrochlorothiazide (PRINZIDE;ZESTORETIC) 20-25 MG per tablet Take 1 tablet by mouth daily 90 tablet 2    levothyroxine (SYNTHROID) 88 MCG tablet 2 tablets daily except wednessday 160 tablet 1    clobetasol (TEMOVATE) 0.05 % ointment Apply topically 2 times daily. 15 g 0    lovastatin (MEVACOR) 40 MG tablet TAKE 1 TABLET BY MOUTH EVERY DAY 90 tablet 3    estradiol (ESTRACE VAGINAL) 0.1 MG/GM vaginal cream Place 1 g vaginally Twice a Week 1 Tube 3    vitamin B-12 (CYANOCOBALAMIN) 100 MCG tablet Take 100 mcg by mouth daily      vitamin D (CHOLECALCIFEROL) 1000 UNIT TABS tablet Take 1,000 Units by mouth daily      L-Methylfolate (DEPLIN) 7.5 MG TABS Take 1 tablet by mouth daily      ARIPiprazole (ABILIFY) 2 MG tablet Take 2 mg by mouth daily       PARoxetine (PAXIL) 40 MG tablet Take 40 mg by mouth every morning       Calcium Carbonate-Vitamin D (CALCIUM + D) 600-200 MG-UNIT TABS Take 1 tablet by mouth daily.  multivitamin (THERAGRAN) per tablet Take 1 tablet by mouth daily.  Misc Natural Products (GLUCOSAMINE CHOND COMPLEX/MSM PO) Take 1 tablet by mouth daily. No current facility-administered medications on file prior to visit.           ALLERGIES:  Allergies as of 07/24/2018 - Review Complete 07/24/2018   Allergen Reaction Noted    Avelox [moxifloxacin hcl in nacl]  11/08/2011    Bactrim  11/08/2011    Macrobid [nitrofurantoin monohydrate macrocrystals]  11/08/2011           Gynecologic History: Patient's last menstrual period was 10/01/1993.      ________________________________________________________________________  REVIEW OF SYSTEMS:       Review of Systems   Respiratory: Negative for cough, shortness of breath and wheezing. Cardiovascular: Negative for chest pain, palpitations and leg swelling. Gastrointestinal: Negative for abdominal pain, blood in stool, constipation and diarrhea. Genitourinary: Negative for difficulty urinating, dysuria, flank pain and hematuria. Skin: Negative. Psychiatric/Behavioral: Negative. PHYSICAL Exam:    Constitutional:  Vitals:    07/24/18 1328   BP: 128/70   Weight: 265 lb (120.2 kg)   Height: 5' 2\" (1.575 m)       Physical Exam   Constitutional: She is oriented to person, place, and time. She appears well-developed and well-nourished. HENT:   Head: Normocephalic and atraumatic. Cardiovascular: Normal rate and regular rhythm. Abdominal: Soft. Normal appearance. There is no tenderness. Musculoskeletal: Normal range of motion. Neurological: She is alert and oriented to person, place, and time. Skin: Skin is warm and intact. No lesion noted. No erythema. Psychiatric: She has a normal mood and affect. Her behavior is normal. Judgment and thought content normal.     Pelvic Exam:   EFG: normal external genitalia  URETHRAL MEATUS: normal size, no diverticula   URETHRA: normal appearing without diverticula or lesions  BLADDER:  No masses or tenderness  VAGINA: normal rugae, no discharge   CERVIX: parous, no lesions  UTERUS: uterus is normal size, shape, consistency and nontender   ADNEXA: normal adnexa in size, nontender and no masses. PERINEUM: normal appearing without lesions or masses  ANUS: normal appearing without lesions or masses  RECTUM: UNREMARKABLE      ASSESSMENT:      71 y.o.  Annual   Diagnosis

## 2018-08-01 DIAGNOSIS — R87.610 ASCUS OF CERVIX WITH NEGATIVE HIGH RISK HPV: ICD-10-CM

## 2018-08-01 DIAGNOSIS — Z01.411 ENCOUNTER FOR GYNECOLOGICAL EXAMINATION WITH ABNORMAL FINDING: ICD-10-CM

## 2018-08-02 ENCOUNTER — OFFICE VISIT (OUTPATIENT)
Dept: FAMILY MEDICINE CLINIC | Age: 73
End: 2018-08-02
Payer: MEDICARE

## 2018-08-02 VITALS
RESPIRATION RATE: 16 BRPM | HEART RATE: 86 BPM | WEIGHT: 265.4 LBS | HEIGHT: 62 IN | DIASTOLIC BLOOD PRESSURE: 72 MMHG | OXYGEN SATURATION: 97 % | SYSTOLIC BLOOD PRESSURE: 114 MMHG | TEMPERATURE: 97.9 F | BODY MASS INDEX: 48.84 KG/M2

## 2018-08-02 DIAGNOSIS — N39.0 URINARY TRACT INFECTION WITHOUT HEMATURIA, SITE UNSPECIFIED: Primary | ICD-10-CM

## 2018-08-02 DIAGNOSIS — R35.0 URINARY FREQUENCY: ICD-10-CM

## 2018-08-02 LAB
BILIRUBIN, POC: ABNORMAL
BLOOD URINE, POC: ABNORMAL
CLARITY, POC: CLEAR
COLOR, POC: ABNORMAL
GLUCOSE URINE, POC: ABNORMAL
KETONES, POC: ABNORMAL
LEUKOCYTE EST, POC: ABNORMAL
NITRITE, POC: ABNORMAL
PH, POC: 6
PROTEIN, POC: ABNORMAL
SPECIFIC GRAVITY, POC: 1.02
UROBILINOGEN, POC: ABNORMAL

## 2018-08-02 PROCEDURE — G8417 CALC BMI ABV UP PARAM F/U: HCPCS | Performed by: NURSE PRACTITIONER

## 2018-08-02 PROCEDURE — 1101F PT FALLS ASSESS-DOCD LE1/YR: CPT | Performed by: NURSE PRACTITIONER

## 2018-08-02 PROCEDURE — 99213 OFFICE O/P EST LOW 20 MIN: CPT | Performed by: NURSE PRACTITIONER

## 2018-08-02 PROCEDURE — G8427 DOCREV CUR MEDS BY ELIG CLIN: HCPCS | Performed by: NURSE PRACTITIONER

## 2018-08-02 PROCEDURE — 3017F COLORECTAL CA SCREEN DOC REV: CPT | Performed by: NURSE PRACTITIONER

## 2018-08-02 PROCEDURE — 1036F TOBACCO NON-USER: CPT | Performed by: NURSE PRACTITIONER

## 2018-08-02 PROCEDURE — 4040F PNEUMOC VAC/ADMIN/RCVD: CPT | Performed by: NURSE PRACTITIONER

## 2018-08-02 PROCEDURE — G8399 PT W/DXA RESULTS DOCUMENT: HCPCS | Performed by: NURSE PRACTITIONER

## 2018-08-02 PROCEDURE — 81003 URINALYSIS AUTO W/O SCOPE: CPT | Performed by: NURSE PRACTITIONER

## 2018-08-02 PROCEDURE — 1123F ACP DISCUSS/DSCN MKR DOCD: CPT | Performed by: NURSE PRACTITIONER

## 2018-08-02 PROCEDURE — 1090F PRES/ABSN URINE INCON ASSESS: CPT | Performed by: NURSE PRACTITIONER

## 2018-08-02 RX ORDER — CIPROFLOXACIN 500 MG/1
500 TABLET, FILM COATED ORAL 2 TIMES DAILY
Qty: 10 TABLET | Refills: 0 | Status: SHIPPED | OUTPATIENT
Start: 2018-08-02 | End: 2018-08-06 | Stop reason: ALTCHOICE

## 2018-08-02 ASSESSMENT — ENCOUNTER SYMPTOMS
NAUSEA: 0
VOMITING: 0
ABDOMINAL PAIN: 0
SHORTNESS OF BREATH: 0
CHEST TIGHTNESS: 0
DIARRHEA: 0

## 2018-08-02 NOTE — PATIENT INSTRUCTIONS
Antibiotics: Take each dose with a small snack or meal to lessen potential GI upset. To prevent antibiotic resistance, please take medication as ordered and for the full duration even if you start to feel better. Consider intake of yogurt or probiotic during antibiotic use and for a few days after to help reduce the risk of developing a secondary infection. Separate the yogurt and antibiotic by at least 1 hour. Avoid alcohol while taking antibiotics. Patient Education        Urinary Tract Infection in Women: Care Instructions  Your Care Instructions    A urinary tract infection, or UTI, is a general term for an infection anywhere between the kidneys and the urethra (where urine comes out). Most UTIs are bladder infections. They often cause pain or burning when you urinate. UTIs are caused by bacteria and can be cured with antibiotics. Be sure to complete your treatment so that the infection goes away. Follow-up care is a key part of your treatment and safety. Be sure to make and go to all appointments, and call your doctor if you are having problems. It's also a good idea to know your test results and keep a list of the medicines you take. How can you care for yourself at home? · Take your antibiotics as directed. Do not stop taking them just because you feel better. You need to take the full course of antibiotics. · Drink extra water and other fluids for the next day or two. This may help wash out the bacteria that are causing the infection. (If you have kidney, heart, or liver disease and have to limit fluids, talk with your doctor before you increase your fluid intake.)  · Avoid drinks that are carbonated or have caffeine. They can irritate the bladder. · Urinate often. Try to empty your bladder each time. · To relieve pain, take a hot bath or lay a heating pad set on low over your lower belly or genital area. Never go to sleep with a heating pad in place.   To prevent UTIs  · Drink plenty of

## 2018-08-03 DIAGNOSIS — N39.0 URINARY TRACT INFECTION WITHOUT HEMATURIA, SITE UNSPECIFIED: ICD-10-CM

## 2018-08-06 ENCOUNTER — TELEPHONE (OUTPATIENT)
Dept: FAMILY MEDICINE CLINIC | Age: 73
End: 2018-08-06

## 2018-08-06 DIAGNOSIS — N39.0 URINARY TRACT INFECTION WITHOUT HEMATURIA, SITE UNSPECIFIED: Primary | ICD-10-CM

## 2018-08-06 LAB
ORGANISM: ABNORMAL
URINE CULTURE, ROUTINE: ABNORMAL
URINE CULTURE, ROUTINE: ABNORMAL

## 2018-08-06 RX ORDER — CEPHALEXIN 500 MG/1
500 CAPSULE ORAL 2 TIMES DAILY
Qty: 20 CAPSULE | Refills: 0 | Status: SHIPPED | OUTPATIENT
Start: 2018-08-06 | End: 2018-08-16

## 2018-08-30 LAB
ALBUMIN SERPL-MCNC: 4.3 G/DL
ALP BLD-CCNC: 71 U/L
ALT SERPL-CCNC: 14 U/L
ANION GAP SERPL CALCULATED.3IONS-SCNC: NORMAL MMOL/L
AST SERPL-CCNC: 20 U/L
BASOPHILS ABSOLUTE: 0.1 /ΜL
BASOPHILS RELATIVE PERCENT: 1 %
BILIRUB SERPL-MCNC: 0.3 MG/DL (ref 0.1–1.4)
BUN BLDV-MCNC: 30 MG/DL
CALCIUM SERPL-MCNC: 10.2 MG/DL
CHLORIDE BLD-SCNC: 103 MMOL/L
CHOLESTEROL, TOTAL: 175 MG/DL
CHOLESTEROL/HDL RATIO: ABNORMAL
CO2: 22 MMOL/L
CREAT SERPL-MCNC: 1.11 MG/DL
EOSINOPHILS ABSOLUTE: 0.5 /ΜL
EOSINOPHILS RELATIVE PERCENT: 7 %
GFR CALCULATED: NORMAL
GLUCOSE BLD-MCNC: 81 MG/DL
HCT VFR BLD CALC: 37.8 % (ref 36–46)
HDLC SERPL-MCNC: 90 MG/DL (ref 35–70)
HEMOGLOBIN: 12.8 G/DL (ref 12–16)
LDL CHOLESTEROL CALCULATED: 60 MG/DL (ref 0–160)
LYMPHOCYTES ABSOLUTE: 2.7 /ΜL
LYMPHOCYTES RELATIVE PERCENT: 37 %
MCH RBC QN AUTO: 29.7 PG
MCHC RBC AUTO-ENTMCNC: 33.9 G/DL
MCV RBC AUTO: 88 FL
MONOCYTES ABSOLUTE: 0.4 /ΜL
MONOCYTES RELATIVE PERCENT: 6 %
NEUTROPHILS ABSOLUTE: 3.6 /ΜL
NEUTROPHILS RELATIVE PERCENT: 49 %
PDW BLD-RTO: 15.1 %
PLATELET # BLD: 327 K/ΜL
PMV BLD AUTO: NORMAL FL
POTASSIUM SERPL-SCNC: 4.7 MMOL/L
RBC # BLD: 4.31 10^6/ΜL
SODIUM BLD-SCNC: 141 MMOL/L
TOTAL PROTEIN: 7.1
TRIGL SERPL-MCNC: 127 MG/DL
TSH SERPL DL<=0.05 MIU/L-ACNC: 17.59 UIU/ML
VLDLC SERPL CALC-MCNC: 25 MG/DL
WBC # BLD: 7.4 10^3/ML

## 2018-09-04 DIAGNOSIS — E03.9 ACQUIRED HYPOTHYROIDISM: ICD-10-CM

## 2018-09-04 DIAGNOSIS — I10 ESSENTIAL HYPERTENSION: ICD-10-CM

## 2018-09-04 DIAGNOSIS — E03.9 ACQUIRED HYPOTHYROIDISM: Primary | ICD-10-CM

## 2018-09-04 DIAGNOSIS — E78.2 MIXED HYPERLIPIDEMIA: ICD-10-CM

## 2018-09-05 LAB — TSH SERPL DL<=0.05 MIU/L-ACNC: 10.57 UIU/ML

## 2018-09-07 DIAGNOSIS — E03.9 ACQUIRED HYPOTHYROIDISM: ICD-10-CM

## 2018-09-11 ENCOUNTER — OFFICE VISIT (OUTPATIENT)
Dept: FAMILY MEDICINE CLINIC | Age: 73
End: 2018-09-11
Payer: MEDICARE

## 2018-09-11 VITALS
HEIGHT: 62 IN | SYSTOLIC BLOOD PRESSURE: 126 MMHG | WEIGHT: 264 LBS | BODY MASS INDEX: 48.58 KG/M2 | HEART RATE: 72 BPM | TEMPERATURE: 97.1 F | DIASTOLIC BLOOD PRESSURE: 74 MMHG | RESPIRATION RATE: 14 BRPM

## 2018-09-11 DIAGNOSIS — Z23 NEED FOR INFLUENZA VACCINATION: ICD-10-CM

## 2018-09-11 DIAGNOSIS — N18.30 CKD (CHRONIC KIDNEY DISEASE) STAGE 3, GFR 30-59 ML/MIN (HCC): ICD-10-CM

## 2018-09-11 DIAGNOSIS — E03.9 ACQUIRED HYPOTHYROIDISM: ICD-10-CM

## 2018-09-11 DIAGNOSIS — E78.2 MIXED HYPERLIPIDEMIA: ICD-10-CM

## 2018-09-11 DIAGNOSIS — L23.9 ALLERGIC DERMATITIS: ICD-10-CM

## 2018-09-11 DIAGNOSIS — Z12.39 BREAST CANCER SCREENING: ICD-10-CM

## 2018-09-11 DIAGNOSIS — I10 ESSENTIAL HYPERTENSION: Primary | ICD-10-CM

## 2018-09-11 DIAGNOSIS — L50.9 URTICARIA: ICD-10-CM

## 2018-09-11 PROCEDURE — G8399 PT W/DXA RESULTS DOCUMENT: HCPCS | Performed by: FAMILY MEDICINE

## 2018-09-11 PROCEDURE — 99214 OFFICE O/P EST MOD 30 MIN: CPT | Performed by: FAMILY MEDICINE

## 2018-09-11 PROCEDURE — 1101F PT FALLS ASSESS-DOCD LE1/YR: CPT | Performed by: FAMILY MEDICINE

## 2018-09-11 PROCEDURE — 1090F PRES/ABSN URINE INCON ASSESS: CPT | Performed by: FAMILY MEDICINE

## 2018-09-11 PROCEDURE — 90662 IIV NO PRSV INCREASED AG IM: CPT | Performed by: FAMILY MEDICINE

## 2018-09-11 PROCEDURE — 3017F COLORECTAL CA SCREEN DOC REV: CPT | Performed by: FAMILY MEDICINE

## 2018-09-11 PROCEDURE — 1123F ACP DISCUSS/DSCN MKR DOCD: CPT | Performed by: FAMILY MEDICINE

## 2018-09-11 PROCEDURE — G0008 ADMIN INFLUENZA VIRUS VAC: HCPCS | Performed by: FAMILY MEDICINE

## 2018-09-11 PROCEDURE — 4040F PNEUMOC VAC/ADMIN/RCVD: CPT | Performed by: FAMILY MEDICINE

## 2018-09-11 PROCEDURE — 1036F TOBACCO NON-USER: CPT | Performed by: FAMILY MEDICINE

## 2018-09-11 PROCEDURE — G8417 CALC BMI ABV UP PARAM F/U: HCPCS | Performed by: FAMILY MEDICINE

## 2018-09-11 PROCEDURE — G8427 DOCREV CUR MEDS BY ELIG CLIN: HCPCS | Performed by: FAMILY MEDICINE

## 2018-09-11 RX ORDER — FAMOTIDINE 20 MG/1
20 TABLET, FILM COATED ORAL 2 TIMES DAILY
Qty: 180 TABLET | Refills: 1 | Status: SHIPPED | OUTPATIENT
Start: 2018-09-11

## 2018-09-11 RX ORDER — LISINOPRIL AND HYDROCHLOROTHIAZIDE 25; 20 MG/1; MG/1
1 TABLET ORAL DAILY
Qty: 90 TABLET | Refills: 2 | Status: SHIPPED | OUTPATIENT
Start: 2018-09-11

## 2018-09-11 RX ORDER — TRIAMCINOLONE ACETONIDE 1 MG/G
CREAM TOPICAL
Qty: 160 G | Refills: 0 | Status: SHIPPED | OUTPATIENT
Start: 2018-09-11 | End: 2019-01-11 | Stop reason: ALTCHOICE

## 2018-09-11 ASSESSMENT — PATIENT HEALTH QUESTIONNAIRE - PHQ9
SUM OF ALL RESPONSES TO PHQ QUESTIONS 1-9: 0
1. LITTLE INTEREST OR PLEASURE IN DOING THINGS: 0
SUM OF ALL RESPONSES TO PHQ9 QUESTIONS 1 & 2: 0
2. FEELING DOWN, DEPRESSED OR HOPELESS: 0
SUM OF ALL RESPONSES TO PHQ QUESTIONS 1-9: 0

## 2018-09-11 NOTE — PROGRESS NOTES
a Week 1 Tube 3    vitamin B-12 (CYANOCOBALAMIN) 100 MCG tablet Take 100 mcg by mouth daily      vitamin D (CHOLECALCIFEROL) 1000 UNIT TABS tablet Take 1,000 Units by mouth daily      L-Methylfolate (DEPLIN) 7.5 MG TABS Take 1 tablet by mouth daily      ARIPiprazole (ABILIFY) 2 MG tablet Take 2 mg by mouth daily       PARoxetine (PAXIL) 40 MG tablet Take 40 mg by mouth every morning       Calcium Carbonate-Vitamin D (CALCIUM + D) 600-200 MG-UNIT TABS Take 1 tablet by mouth daily.  multivitamin (THERAGRAN) per tablet Take 1 tablet by mouth daily.  Misc Natural Products (GLUCOSAMINE CHOND COMPLEX/MSM PO) Take 1 tablet by mouth daily. No current facility-administered medications for this visit.         Allergies   Allergen Reactions    Avelox [Moxifloxacin Hcl In Nacl]     Bactrim     Macrobid [Nitrofurantoin Monohydrate Macrocrystals]        Social History   Substance Use Topics    Smoking status: Former Smoker     Packs/day: 0.25     Years: 3.00     Types: Cigarettes     Quit date: 1/1/1970    Smokeless tobacco: Never Used    Alcohol use Yes      Comment: rare        Review of Systems - General ROS: negative for - fatigue, fever, malaise, night sweats, sleep disturbance or weight loss  ENT ROS: negative for - hearing change, nasal discharge, oral lesions, sinus pain, sore throat, tinnitus or vertigo  Allergy and Immunology ROS: negative for - hives, nasal congestion or seasonal allergies  Hematological and Lymphatic ROS: negative for - bruising, fatigue, night sweats or pallor  Endocrine ROS: negative for - hot flashes, malaise/lethargy, palpitations, polydipsia/polyuria, skin changes, temperature intolerance or unexpected weight changes  Respiratory ROS: negative for - cough, hemoptysis, pleuritic pain, shortness of breath or wheezing  Cardiovascular ROS: no chest pain or dyspnea on exertion  Gastrointestinal ROS: no abdominal pain, change in bowel habits, or black or bloody disease) stage 3, GFR 30-59 ml/min             Plan:     Orders Placed This Encounter   Procedures    ANDREINA DIGITAL SCREEN W CAD BILATERAL     Standing Status:   Future     Standing Expiration Date:   9/11/2019     Order Specific Question:   Reason for exam:     Answer:   SCREENING    INFLUENZA, HIGH DOSE, 65 YRS +, IM, PF, PREFILL SYR, 0.5ML (FLUZONE HD)    Comprehensive Metabolic Panel     Standing Status:   Future     Standing Expiration Date:   9/12/2019    Lipid Panel     Standing Status:   Future     Standing Expiration Date:   9/12/2019     Order Specific Question:   Is Patient Fasting?/# of Hours     Answer:   8-10    T4, Free     Standing Status:   Future     Standing Expiration Date:   9/12/2019    TSH without Reflex     Standing Status:   Future     Standing Expiration Date:   9/11/2019     Outpatient Encounter Prescriptions as of 9/11/2018   Medication Sig Dispense Refill    lisinopril-hydrochlorothiazide (PRINZIDE;ZESTORETIC) 20-25 MG per tablet Take 1 tablet by mouth daily 90 tablet 2    triamcinolone (KENALOG) 0.1 % cream Apply topically 2 times daily. 160 g 0    famotidine (PEPCID) 20 MG tablet Take 1 tablet by mouth 2 times daily 180 tablet 1    levothyroxine (SYNTHROID) 175 MCG tablet TAKE 1 TABLET EVERY DAY 90 tablet 1    clobetasol (TEMOVATE) 0.05 % ointment Apply topically 2 times daily.  15 g 0    lovastatin (MEVACOR) 40 MG tablet TAKE 1 TABLET BY MOUTH EVERY DAY 90 tablet 3    estradiol (ESTRACE VAGINAL) 0.1 MG/GM vaginal cream Place 1 g vaginally Twice a Week 1 Tube 3    vitamin B-12 (CYANOCOBALAMIN) 100 MCG tablet Take 100 mcg by mouth daily      vitamin D (CHOLECALCIFEROL) 1000 UNIT TABS tablet Take 1,000 Units by mouth daily      L-Methylfolate (DEPLIN) 7.5 MG TABS Take 1 tablet by mouth daily      ARIPiprazole (ABILIFY) 2 MG tablet Take 2 mg by mouth daily       PARoxetine (PAXIL) 40 MG tablet Take 40 mg by mouth every morning       Calcium Carbonate-Vitamin D (CALCIUM + D) 600-200 MG-UNIT TABS Take 1 tablet by mouth daily.  multivitamin (THERAGRAN) per tablet Take 1 tablet by mouth daily.  Misc Natural Products (GLUCOSAMINE CHOND COMPLEX/MSM PO) Take 1 tablet by mouth daily.  [DISCONTINUED] lisinopril-hydrochlorothiazide (PRINZIDE;ZESTORETIC) 20-25 MG per tablet Take 1 tablet by mouth daily 90 tablet 2    [DISCONTINUED] levothyroxine (SYNTHROID) 88 MCG tablet 2 tablets daily except wednessday 160 tablet 1     No facility-administered encounter medications on file as of 9/11/2018. Hives are generally idiopathic; possibly related to foods, sun, heat, cold or viruses. Use OTC benadryl, call if symptoms persist or worsen. Can try other antihistamines or possibly H2 blockers later prn. The nature of cardiac risk has been fully discussed with this patient. I have made her aware of her LDL target goal given her cardiovascular risk analysis. I have discussed the appropriate diet. The need for lifelong compliance in order to reduce risk is stressed. A regular exercise program is recommended to help achieve and maintain normal body weight, fitness and improve lipid balance. A written copy of a low fat, low cholesterol diet has been given to the patient. Return in about 4 months (around 1/11/2019). Vaccine Information Sheet, \"Influenza - Inactivated\" OR \"Live - Intranasal\"  given to [address] [place] [address] Rd. Patient responses:    Have you ever had a reaction to a flu vaccine? No  Are you able to eat eggs without adverse effects? Yes  Do you have any current illness? No  Have you ever had Guillian Cameron Syndrome? No    Flu vaccine given per order. Please see immunization tab.

## 2018-09-11 NOTE — PATIENT INSTRUCTIONS
ibuprofen. Some of these medicines can raise blood pressure. · Learn how to check your blood pressure at home. Lifestyle changes  · Stay at a healthy weight. This is especially important if you put on weight around the waist. Losing even 10 pounds can help you lower your blood pressure. · If your doctor recommends it, get more exercise. Walking is a good choice. Bit by bit, increase the amount you walk every day. Try for at least 30 minutes on most days of the week. You also may want to swim, bike, or do other activities. · Avoid or limit alcohol. Talk to your doctor about whether you can drink any alcohol. · Try to limit how much sodium you eat to less than 2,300 milligrams (mg) a day. Your doctor may ask you to try to eat less than 1,500 mg a day. · Eat plenty of fruits (such as bananas and oranges), vegetables, legumes, whole grains, and low-fat dairy products. · Lower the amount of saturated fat in your diet. Saturated fat is found in animal products such as milk, cheese, and meat. Limiting these foods may help you lose weight and also lower your risk for heart disease. · Do not smoke. Smoking increases your risk for heart attack and stroke. If you need help quitting, talk to your doctor about stop-smoking programs and medicines. These can increase your chances of quitting for good. When should you call for help? Call 911 anytime you think you may need emergency care. This may mean having symptoms that suggest that your blood pressure is causing a serious heart or blood vessel problem. Your blood pressure may be over 180/110.   For example, call 911 if:    · You have symptoms of a heart attack. These may include:  ¨ Chest pain or pressure, or a strange feeling in the chest.  ¨ Sweating. ¨ Shortness of breath. ¨ Nausea or vomiting. ¨ Pain, pressure, or a strange feeling in the back, neck, jaw, or upper belly or in one or both shoulders or arms. ¨ Lightheadedness or sudden weakness.   ¨ A fast or irregular heartbeat.     · You have symptoms of a stroke. These may include:  ¨ Sudden numbness, tingling, weakness, or loss of movement in your face, arm, or leg, especially on only one side of your body. ¨ Sudden vision changes. ¨ Sudden trouble speaking. ¨ Sudden confusion or trouble understanding simple statements. ¨ Sudden problems with walking or balance. ¨ A sudden, severe headache that is different from past headaches.     · You have severe back or belly pain.    Do not wait until your blood pressure comes down on its own. Get help right away.   Call your doctor now or seek immediate care if:    · Your blood pressure is much higher than normal (such as 180/110 or higher), but you don't have symptoms.     · You think high blood pressure is causing symptoms, such as:  ¨ Severe headache. ¨ Blurry vision.    Watch closely for changes in your health, and be sure to contact your doctor if:    · Your blood pressure measures 140/90 or higher at least 2 times. That means the top number is 140 or higher or the bottom number is 90 or higher, or both.     · You think you may be having side effects from your blood pressure medicine.     · Your blood pressure is usually normal, but it goes above normal at least 2 times. Where can you learn more? Go to [email or web address]. org and sign in to your [employer] account. Enter G269 in the Nitro PDF box to learn more about \"High Blood Pressure: Care Instructions. \"     If you do not have an account, please click on the \"Sign Up Now\" link. Current as of: December 6, 2017  Content Version: 11.7  © 2496-8580 [employer]. Care instructions adapted under license by [facility] ([facility]). If you have questions about a medical condition or this instruction, always ask your healthcare professional. [place] [address] any warranty or liability for your use of this information.

## 2018-10-04 ENCOUNTER — HOSPITAL ENCOUNTER (OUTPATIENT)
Dept: WOMENS IMAGING | Age: 73
Discharge: HOME | End: 2018-10-06
Payer: MEDICARE

## 2018-10-04 DIAGNOSIS — Z12.39 BREAST CANCER SCREENING: ICD-10-CM

## 2018-10-04 PROCEDURE — 77067 SCR MAMMO BI INCL CAD: CPT

## 2018-10-17 DIAGNOSIS — Z78.0 POSTMENOPAUSAL: Primary | ICD-10-CM

## 2018-10-25 ENCOUNTER — HOSPITAL ENCOUNTER (OUTPATIENT)
Dept: GENERAL RADIOLOGY | Age: 73
Discharge: HOME | End: 2018-10-27
Payer: MEDICARE

## 2018-10-25 DIAGNOSIS — Z78.0 POSTMENOPAUSAL: ICD-10-CM

## 2018-10-25 PROCEDURE — 77080 DXA BONE DENSITY AXIAL: CPT

## 2018-11-26 RX ORDER — LOVASTATIN 40 MG/1
TABLET ORAL
Qty: 90 TABLET | Refills: 2 | Status: SHIPPED | OUTPATIENT
Start: 2018-11-26

## 2018-12-13 ENCOUNTER — TELEPHONE (OUTPATIENT)
Dept: FAMILY MEDICINE CLINIC | Age: 73
End: 2018-12-13

## 2018-12-13 DIAGNOSIS — E03.9 ACQUIRED HYPOTHYROIDISM: Primary | ICD-10-CM

## 2018-12-13 RX ORDER — LEVOTHYROXINE SODIUM 88 UG/1
176 TABLET ORAL DAILY
Qty: 180 TABLET | Refills: 1 | Status: SHIPPED | OUTPATIENT
Start: 2018-12-13

## 2019-01-04 LAB
ALBUMIN SERPL-MCNC: 4.2 G/DL
ALP BLD-CCNC: 72 U/L
ALT SERPL-CCNC: 13 U/L
ANION GAP SERPL CALCULATED.3IONS-SCNC: NORMAL MMOL/L
AST SERPL-CCNC: 21 U/L
BILIRUB SERPL-MCNC: 0.3 MG/DL (ref 0.1–1.4)
BUN BLDV-MCNC: 32 MG/DL
CALCIUM SERPL-MCNC: 10.2 MG/DL
CHLORIDE BLD-SCNC: 105 MMOL/L
CHOLESTEROL, TOTAL: 171 MG/DL
CHOLESTEROL/HDL RATIO: ABNORMAL
CO2: 23 MMOL/L
CREAT SERPL-MCNC: 1.19 MG/DL
GFR CALCULATED: NORMAL
GLUCOSE BLD-MCNC: 90 MG/DL
HDLC SERPL-MCNC: 84 MG/DL (ref 35–70)
LDL CHOLESTEROL CALCULATED: 65 MG/DL (ref 0–160)
POTASSIUM SERPL-SCNC: 4.9 MMOL/L
SODIUM BLD-SCNC: 143 MMOL/L
T4 FREE: 1.46
TOTAL PROTEIN: 6.9
TRIGL SERPL-MCNC: 112 MG/DL
TSH SERPL DL<=0.05 MIU/L-ACNC: 0.87 UIU/ML
VLDLC SERPL CALC-MCNC: 22 MG/DL

## 2019-01-07 DIAGNOSIS — I10 ESSENTIAL HYPERTENSION: ICD-10-CM

## 2019-01-07 DIAGNOSIS — E03.9 ACQUIRED HYPOTHYROIDISM: ICD-10-CM

## 2019-01-07 DIAGNOSIS — E78.2 MIXED HYPERLIPIDEMIA: ICD-10-CM

## 2019-01-11 ENCOUNTER — OFFICE VISIT (OUTPATIENT)
Dept: FAMILY MEDICINE CLINIC | Age: 74
End: 2019-01-11
Payer: MEDICARE

## 2019-01-11 VITALS
WEIGHT: 268.6 LBS | TEMPERATURE: 97.1 F | RESPIRATION RATE: 14 BRPM | HEART RATE: 88 BPM | BODY MASS INDEX: 49.43 KG/M2 | SYSTOLIC BLOOD PRESSURE: 128 MMHG | HEIGHT: 62 IN | DIASTOLIC BLOOD PRESSURE: 68 MMHG

## 2019-01-11 DIAGNOSIS — E78.2 MIXED HYPERLIPIDEMIA: ICD-10-CM

## 2019-01-11 DIAGNOSIS — N18.30 CHRONIC KIDNEY DISEASE, STAGE III (MODERATE) (HCC): ICD-10-CM

## 2019-01-11 DIAGNOSIS — I10 ESSENTIAL HYPERTENSION: Primary | ICD-10-CM

## 2019-01-11 DIAGNOSIS — E66.01 MORBID OBESITY WITH BMI OF 45.0-49.9, ADULT (HCC): ICD-10-CM

## 2019-01-11 DIAGNOSIS — E03.9 ACQUIRED HYPOTHYROIDISM: ICD-10-CM

## 2019-01-11 DIAGNOSIS — F41.1 GENERALIZED ANXIETY DISORDER: ICD-10-CM

## 2019-01-11 DIAGNOSIS — F33.42 RECURRENT MAJOR DEPRESSIVE DISORDER, IN FULL REMISSION (HCC): ICD-10-CM

## 2019-01-11 DIAGNOSIS — S31.829A WOUND OF LEFT BUTTOCK, INITIAL ENCOUNTER: ICD-10-CM

## 2019-01-11 PROCEDURE — G8482 FLU IMMUNIZE ORDER/ADMIN: HCPCS | Performed by: FAMILY MEDICINE

## 2019-01-11 PROCEDURE — 1101F PT FALLS ASSESS-DOCD LE1/YR: CPT | Performed by: FAMILY MEDICINE

## 2019-01-11 PROCEDURE — 1090F PRES/ABSN URINE INCON ASSESS: CPT | Performed by: FAMILY MEDICINE

## 2019-01-11 PROCEDURE — G8399 PT W/DXA RESULTS DOCUMENT: HCPCS | Performed by: FAMILY MEDICINE

## 2019-01-11 PROCEDURE — 4040F PNEUMOC VAC/ADMIN/RCVD: CPT | Performed by: FAMILY MEDICINE

## 2019-01-11 PROCEDURE — 1036F TOBACCO NON-USER: CPT | Performed by: FAMILY MEDICINE

## 2019-01-11 PROCEDURE — 99214 OFFICE O/P EST MOD 30 MIN: CPT | Performed by: FAMILY MEDICINE

## 2019-01-11 PROCEDURE — G8427 DOCREV CUR MEDS BY ELIG CLIN: HCPCS | Performed by: FAMILY MEDICINE

## 2019-01-11 PROCEDURE — 1123F ACP DISCUSS/DSCN MKR DOCD: CPT | Performed by: FAMILY MEDICINE

## 2019-01-11 PROCEDURE — G8417 CALC BMI ABV UP PARAM F/U: HCPCS | Performed by: FAMILY MEDICINE

## 2019-01-11 PROCEDURE — 3017F COLORECTAL CA SCREEN DOC REV: CPT | Performed by: FAMILY MEDICINE

## 2019-01-11 RX ORDER — GENTAMICIN SULFATE 1 MG/G
CREAM TOPICAL
Qty: 15 G | Refills: 1 | Status: SHIPPED | OUTPATIENT
Start: 2019-01-11 | End: 2020-02-20

## 2019-01-24 ENCOUNTER — OFFICE VISIT (OUTPATIENT)
Dept: OBGYN CLINIC | Age: 74
End: 2019-01-24
Payer: MEDICARE

## 2019-01-24 VITALS
SYSTOLIC BLOOD PRESSURE: 122 MMHG | BODY MASS INDEX: 49.13 KG/M2 | HEIGHT: 62 IN | WEIGHT: 267 LBS | DIASTOLIC BLOOD PRESSURE: 64 MMHG

## 2019-01-24 DIAGNOSIS — Z01.419 ENCOUNTER FOR WELL WOMAN EXAM: Primary | ICD-10-CM

## 2019-01-24 DIAGNOSIS — Z12.31 SCREENING MAMMOGRAM, ENCOUNTER FOR: ICD-10-CM

## 2019-01-24 PROCEDURE — G8482 FLU IMMUNIZE ORDER/ADMIN: HCPCS | Performed by: OBSTETRICS & GYNECOLOGY

## 2019-01-24 PROCEDURE — G0101 CA SCREEN;PELVIC/BREAST EXAM: HCPCS | Performed by: OBSTETRICS & GYNECOLOGY

## 2019-01-24 ASSESSMENT — ENCOUNTER SYMPTOMS
ALLERGIC/IMMUNOLOGIC NEGATIVE: 1
ANAL BLEEDING: 0
ABDOMINAL DISTENTION: 0
ABDOMINAL PAIN: 0
RESPIRATORY NEGATIVE: 1
RECTAL PAIN: 0
VOMITING: 0
CONSTIPATION: 0
NAUSEA: 0
BLOOD IN STOOL: 0
EYES NEGATIVE: 1
DIARRHEA: 0

## 2019-03-07 ENCOUNTER — TELEPHONE (OUTPATIENT)
Dept: FAMILY MEDICINE CLINIC | Age: 74
End: 2019-03-07

## 2020-02-20 ENCOUNTER — OFFICE VISIT (OUTPATIENT)
Dept: OBGYN CLINIC | Age: 75
End: 2020-02-20
Payer: MEDICARE

## 2020-02-20 VITALS
HEIGHT: 62 IN | BODY MASS INDEX: 46.56 KG/M2 | SYSTOLIC BLOOD PRESSURE: 126 MMHG | DIASTOLIC BLOOD PRESSURE: 82 MMHG | WEIGHT: 253 LBS

## 2020-02-20 PROCEDURE — G8484 FLU IMMUNIZE NO ADMIN: HCPCS | Performed by: OBSTETRICS & GYNECOLOGY

## 2020-02-20 PROCEDURE — G0101 CA SCREEN;PELVIC/BREAST EXAM: HCPCS | Performed by: OBSTETRICS & GYNECOLOGY

## 2020-02-20 NOTE — PROGRESS NOTES
Subjective:      Patient ID: Sandra Schulz is a 69 y.o. female    Annual exam.  S/P JOAN/BSO. No GYN complaints. Pap deferred. Screening mammogram ordered. Monthly SBE encouraged. Dexa scan ordered per protocol. Screening colonoscopy recommended per routine  . F/U annual exam or prn. Vitals:  /82   Ht 5' 2\" (1.575 m)   Wt 253 lb (114.8 kg)   LMP 10/01/1993   BMI 46.27 kg/m²   Past Medical History:   Diagnosis Date    Anxiety     Chronic kidney disease     Depression     Generalized anxiety disorder     Hyperlipidemia     Hypertension     Hypothyroidism     Osteoarthritis     Vitamin D deficiency      Past Surgical History:   Procedure Laterality Date    CYST REMOVAL      CERVICAL    HYSTERECTOMY  1993    JOAN/BSO    JOAN AND BSO      TONSILLECTOMY AND ADENOIDECTOMY      TOTAL KNEE ARTHROPLASTY      RIGHT     Allergies: Avelox [moxifloxacin hcl in nacl]; Bactrim; and Macrobid [nitrofurantoin monohydrate macrocrystals]  Current Outpatient Medications   Medication Sig Dispense Refill    levothyroxine (SYNTHROID) 88 MCG tablet Take 2 tablets by mouth daily 180 tablet 1    lovastatin (MEVACOR) 40 MG tablet TAKE 1 TABLET EVERY DAY 90 tablet 2    lisinopril-hydrochlorothiazide (PRINZIDE;ZESTORETIC) 20-25 MG per tablet Take 1 tablet by mouth daily 90 tablet 2    famotidine (PEPCID) 20 MG tablet Take 1 tablet by mouth 2 times daily 180 tablet 1    vitamin B-12 (CYANOCOBALAMIN) 100 MCG tablet Take 100 mcg by mouth daily      vitamin D (CHOLECALCIFEROL) 1000 UNIT TABS tablet Take 1,000 Units by mouth daily      L-Methylfolate (DEPLIN) 7.5 MG TABS Take 1 tablet by mouth daily      ARIPiprazole (ABILIFY) 2 MG tablet Take 2 mg by mouth daily       PARoxetine (PAXIL) 40 MG tablet Take 40 mg by mouth every morning       Calcium Carbonate-Vitamin D (CALCIUM + D) 600-200 MG-UNIT TABS Take 1 tablet by mouth daily.  multivitamin (THERAGRAN) per tablet Take 1 tablet by mouth daily. Respiratory: Negative. Cardiovascular: Negative. Gastrointestinal: Negative for abdominal distention, abdominal pain, anal bleeding, blood in stool, constipation, diarrhea, nausea, rectal pain and vomiting. Endocrine: Negative. Genitourinary: Negative for decreased urine volume, difficulty urinating, dyspareunia, dysuria, enuresis, flank pain, frequency, genital sores, hematuria, menstrual problem, pelvic pain, urgency, vaginal bleeding, vaginal discharge and vaginal pain. Musculoskeletal: Negative. Skin: Negative. Allergic/Immunologic: Negative. Neurological: Negative. Hematological: Negative. Psychiatric/Behavioral: Negative. Objective:     Physical Exam  Constitutional:       Appearance: She is well-developed. HENT:      Head: Normocephalic. Neck:      Musculoskeletal: Normal range of motion and neck supple. Thyroid: No thyromegaly. Cardiovascular:      Rate and Rhythm: Normal rate and regular rhythm. Heart sounds: Normal heart sounds. Pulmonary:      Effort: Pulmonary effort is normal. No respiratory distress. Breath sounds: Normal breath sounds. No wheezing or rales. Chest:      Chest wall: No tenderness. Abdominal:      General: Bowel sounds are normal. There is no distension. Palpations: Abdomen is soft. There is no mass. Tenderness: There is no abdominal tenderness. There is no guarding or rebound. Hernia: There is no hernia in the right inguinal area or left inguinal area. Genitourinary:     Labia:         Right: No rash, tenderness, lesion or injury. Left: No rash, tenderness, lesion or injury. Vagina: Normal. No signs of injury and foreign body. No vaginal discharge, erythema, tenderness or bleeding. Rectum: Normal.      Comments: S/P JOAN/BSO  Musculoskeletal: Normal range of motion. General: No tenderness. Lymphadenopathy:      Cervical: No cervical adenopathy.    Skin:     General: Skin is warm and dry. Coloration: Skin is not pale. Findings: No erythema or rash. Neurological:      Mental Status: She is alert and oriented to person, place, and time. Psychiatric:         Behavior: Behavior normal.         Thought Content: Thought content normal.         Judgment: Judgment normal.         Assessment:      Diagnosis Orders   1. Encounter for well woman exam with routine gynecological exam     2. Screening mammogram, encounter for  ANDREINA DIGITAL SCREEN W CAD BILATERAL         Plan:      Medications placedthis encounter:  No orders of the defined types were placed in this encounter. Orders placedthis encounter:  Orders Placed This Encounter   Procedures    ANDREINA DIGITAL SCREEN W CAD BILATERAL     Standing Status:   Future     Standing Expiration Date:   2/19/2021         Follow up:  Return in about 1 year (around 2/20/2021) for Annual.   Repeat Annual GYN exam every 1 year. Cervical Cytology exam starts age 24. If Negative Cytology;  follow-up screening per current guidelines. Mammograms yearly starting at age 36. Calcium and Vitamin D dosing reviewed ( age appropriate ). Colonoscopy and bone density screening discussed ( age appropriate ). Birth control and STD prevention discussed ( age appropriate ). Gardisil counseling completed for all patients 7-33 yo. Routine health maintenance ( per PCP and guidelines ). The patient was asked if she would like a chaperone present for her intimate exam. She  Declined the chaperone.  Caron Kimble

## 2020-03-01 ASSESSMENT — ENCOUNTER SYMPTOMS
BLOOD IN STOOL: 0
ALLERGIC/IMMUNOLOGIC NEGATIVE: 1
RECTAL PAIN: 0
ANAL BLEEDING: 0
ABDOMINAL PAIN: 0
RESPIRATORY NEGATIVE: 1
DIARRHEA: 0
VOMITING: 0
ABDOMINAL DISTENTION: 0
NAUSEA: 0
CONSTIPATION: 0
EYES NEGATIVE: 1

## 2023-03-14 DIAGNOSIS — F33.1 MAJOR DEPRESSIVE DISORDER, RECURRENT, MODERATE (MULTI): ICD-10-CM

## 2023-03-14 RX ORDER — TRAZODONE HYDROCHLORIDE 50 MG/1
50 TABLET ORAL NIGHTLY
Qty: 90 TABLET | Refills: 1 | Status: SHIPPED | OUTPATIENT
Start: 2023-03-14 | End: 2023-09-07

## 2023-03-25 DIAGNOSIS — K21.9 GASTRO-ESOPHAGEAL REFLUX DISEASE WITHOUT ESOPHAGITIS: ICD-10-CM

## 2023-03-27 RX ORDER — FAMOTIDINE 20 MG/1
20 TABLET, FILM COATED ORAL 2 TIMES DAILY
Qty: 180 TABLET | Refills: 1 | Status: SHIPPED | OUTPATIENT
Start: 2023-03-27 | End: 2023-09-15 | Stop reason: SDUPTHER

## 2023-03-28 ENCOUNTER — TELEPHONE (OUTPATIENT)
Dept: PRIMARY CARE | Facility: CLINIC | Age: 78
End: 2023-03-28
Payer: MEDICARE

## 2023-03-28 NOTE — TELEPHONE ENCOUNTER
FYI: Marlene wanted to informed Dr Up that she was in the ER on Sunday and Monday for Bursitis in right hip.

## 2023-03-29 NOTE — TELEPHONE ENCOUNTER
PER DR ELIAS    NOTED, CHECK TO SEE HOW PATIENT IS DOING.     SPOKE WITH PATIENT, SHE STATES THAT SHE IS FEELING BETTER AND THAT SHE HAS APPT WITH DR. ESTRADA ON Monday.

## 2023-05-09 LAB
ALANINE AMINOTRANSFERASE (SGPT) (U/L) IN SER/PLAS: 11 U/L (ref 7–45)
ALBUMIN (G/DL) IN SER/PLAS: 4.2 G/DL (ref 3.4–5)
ALKALINE PHOSPHATASE (U/L) IN SER/PLAS: 62 U/L (ref 33–136)
ANION GAP IN SER/PLAS: 12 MMOL/L (ref 10–20)
ASPARTATE AMINOTRANSFERASE (SGOT) (U/L) IN SER/PLAS: 18 U/L (ref 9–39)
BASOPHILS (10*3/UL) IN BLOOD BY AUTOMATED COUNT: 0.06 X10E9/L (ref 0–0.1)
BASOPHILS/100 LEUKOCYTES IN BLOOD BY AUTOMATED COUNT: 1.2 % (ref 0–2)
BILIRUBIN TOTAL (MG/DL) IN SER/PLAS: 0.5 MG/DL (ref 0–1.2)
CALCIUM (MG/DL) IN SER/PLAS: 10 MG/DL (ref 8.6–10.3)
CARBON DIOXIDE, TOTAL (MMOL/L) IN SER/PLAS: 25 MMOL/L (ref 21–32)
CHLORIDE (MMOL/L) IN SER/PLAS: 104 MMOL/L (ref 98–107)
CHOLESTEROL (MG/DL) IN SER/PLAS: 183 MG/DL (ref 0–199)
CHOLESTEROL IN HDL (MG/DL) IN SER/PLAS: 94.2 MG/DL
CHOLESTEROL/HDL RATIO: 1.9
COBALAMIN (VITAMIN B12) (PG/ML) IN SER/PLAS: 369 PG/ML (ref 211–911)
CREATININE (MG/DL) IN SER/PLAS: 1.28 MG/DL (ref 0.5–1.05)
EOSINOPHILS (10*3/UL) IN BLOOD BY AUTOMATED COUNT: 0.21 X10E9/L (ref 0–0.4)
EOSINOPHILS/100 LEUKOCYTES IN BLOOD BY AUTOMATED COUNT: 4.3 % (ref 0–6)
ERYTHROCYTE DISTRIBUTION WIDTH (RATIO) BY AUTOMATED COUNT: 14.2 % (ref 11.5–14.5)
ERYTHROCYTE MEAN CORPUSCULAR HEMOGLOBIN CONCENTRATION (G/DL) BY AUTOMATED: 31.8 G/DL (ref 32–36)
ERYTHROCYTE MEAN CORPUSCULAR VOLUME (FL) BY AUTOMATED COUNT: 94 FL (ref 80–100)
ERYTHROCYTES (10*6/UL) IN BLOOD BY AUTOMATED COUNT: 3.59 X10E12/L (ref 4–5.2)
FERRITIN (UG/LL) IN SER/PLAS: 82 UG/L (ref 8–150)
FOLATE (NG/ML) IN SER/PLAS: >22.3 NG/ML
GFR FEMALE: 43 ML/MIN/1.73M2
GLUCOSE (MG/DL) IN SER/PLAS: 92 MG/DL (ref 74–99)
HEMATOCRIT (%) IN BLOOD BY AUTOMATED COUNT: 33.6 % (ref 36–46)
HEMOGLOBIN (G/DL) IN BLOOD: 10.7 G/DL (ref 12–16)
IMMATURE GRANULOCYTES/100 LEUKOCYTES IN BLOOD BY AUTOMATED COUNT: 0.2 % (ref 0–0.9)
IRON (UG/DL) IN SER/PLAS: 124 UG/DL (ref 35–150)
IRON BINDING CAPACITY (UG/DL) IN SER/PLAS: 391 UG/DL (ref 240–445)
IRON SATURATION (%) IN SER/PLAS: 32 % (ref 25–45)
LDL: 71 MG/DL (ref 0–99)
LEUKOCYTES (10*3/UL) IN BLOOD BY AUTOMATED COUNT: 4.9 X10E9/L (ref 4.4–11.3)
LYMPHOCYTES (10*3/UL) IN BLOOD BY AUTOMATED COUNT: 1.49 X10E9/L (ref 0.8–3)
LYMPHOCYTES/100 LEUKOCYTES IN BLOOD BY AUTOMATED COUNT: 30.2 % (ref 13–44)
MONOCYTES (10*3/UL) IN BLOOD BY AUTOMATED COUNT: 0.41 X10E9/L (ref 0.05–0.8)
MONOCYTES/100 LEUKOCYTES IN BLOOD BY AUTOMATED COUNT: 8.3 % (ref 2–10)
NEUTROPHILS (10*3/UL) IN BLOOD BY AUTOMATED COUNT: 2.75 X10E9/L (ref 1.6–5.5)
NEUTROPHILS/100 LEUKOCYTES IN BLOOD BY AUTOMATED COUNT: 55.8 % (ref 40–80)
PLATELETS (10*3/UL) IN BLOOD AUTOMATED COUNT: 358 X10E9/L (ref 150–450)
POTASSIUM (MMOL/L) IN SER/PLAS: 4.8 MMOL/L (ref 3.5–5.3)
PROTEIN TOTAL: 6.9 G/DL (ref 6.4–8.2)
SODIUM (MMOL/L) IN SER/PLAS: 136 MMOL/L (ref 136–145)
THYROTROPIN (MIU/L) IN SER/PLAS BY DETECTION LIMIT <= 0.05 MIU/L: 1.04 MIU/L (ref 0.44–3.98)
THYROXINE (T4) FREE (NG/DL) IN SER/PLAS: 1.01 NG/DL (ref 0.61–1.12)
TRIGLYCERIDE (MG/DL) IN SER/PLAS: 87 MG/DL (ref 0–149)
UREA NITROGEN (MG/DL) IN SER/PLAS: 38 MG/DL (ref 6–23)
VLDL: 17 MG/DL (ref 0–40)

## 2023-05-12 PROBLEM — N39.46 MIXED INCONTINENCE: Status: ACTIVE | Noted: 2023-05-12

## 2023-05-12 PROBLEM — E78.2 MIXED HYPERLIPIDEMIA: Status: ACTIVE | Noted: 2023-05-12

## 2023-05-12 PROBLEM — N18.31 CHRONIC KIDNEY DISEASE, STAGE 3A (MULTI): Status: ACTIVE | Noted: 2023-05-12

## 2023-05-12 PROBLEM — F33.1 MODERATE EPISODE OF RECURRENT MAJOR DEPRESSIVE DISORDER (MULTI): Status: ACTIVE | Noted: 2023-05-12

## 2023-05-12 PROBLEM — G89.29 CHRONIC RIGHT SHOULDER PAIN: Status: ACTIVE | Noted: 2023-05-12

## 2023-05-12 PROBLEM — H40.9 GLAUCOMA: Status: ACTIVE | Noted: 2023-05-12

## 2023-05-12 PROBLEM — M17.9 KNEE OSTEOARTHRITIS: Status: ACTIVE | Noted: 2023-05-12

## 2023-05-12 PROBLEM — I10 ESSENTIAL HYPERTENSION: Status: ACTIVE | Noted: 2023-05-12

## 2023-05-12 PROBLEM — R19.8 ALTERNATING CONSTIPATION AND DIARRHEA: Status: ACTIVE | Noted: 2023-05-12

## 2023-05-12 PROBLEM — H18.413 ARCUS SENILIS, BILATERAL: Status: ACTIVE | Noted: 2023-05-12

## 2023-05-12 PROBLEM — M25.511 CHRONIC RIGHT SHOULDER PAIN: Status: ACTIVE | Noted: 2023-05-12

## 2023-05-12 PROBLEM — K21.9 GASTRO-ESOPHAGEAL REFLUX DISEASE WITHOUT ESOPHAGITIS: Status: ACTIVE | Noted: 2023-05-12

## 2023-05-12 PROBLEM — H18.519 CORNEA GUTTATA: Status: ACTIVE | Noted: 2023-05-12

## 2023-05-12 PROBLEM — M70.61 TROCHANTERIC BURSITIS OF RIGHT HIP: Status: ACTIVE | Noted: 2023-05-12

## 2023-05-12 PROBLEM — Z86.010 HISTORY OF COLONIC POLYPS: Status: ACTIVE | Noted: 2023-05-12

## 2023-05-12 PROBLEM — Z86.0100 HISTORY OF COLONIC POLYPS: Status: ACTIVE | Noted: 2023-05-12

## 2023-05-12 PROBLEM — H26.9 CATARACTS, BILATERAL: Status: ACTIVE | Noted: 2023-05-12

## 2023-05-12 PROBLEM — K59.09 CHRONIC CONSTIPATION WITH OVERFLOW: Status: ACTIVE | Noted: 2023-05-12

## 2023-05-12 PROBLEM — M70.70 HIP BURSITIS: Status: ACTIVE | Noted: 2023-05-12

## 2023-05-12 PROBLEM — D64.9 CHRONIC ANEMIA: Status: ACTIVE | Noted: 2023-05-12

## 2023-05-12 PROBLEM — K52.9 CHRONIC DIARRHEA: Status: ACTIVE | Noted: 2023-05-12

## 2023-05-12 PROBLEM — R19.7: Status: ACTIVE | Noted: 2023-05-12

## 2023-05-12 PROBLEM — E03.9 ACQUIRED HYPOTHYROIDISM: Status: ACTIVE | Noted: 2023-05-12

## 2023-05-12 PROBLEM — H25.13 NUCLEAR SCLEROTIC CATARACT OF BOTH EYES: Status: ACTIVE | Noted: 2023-05-12

## 2023-05-12 RX ORDER — LISINOPRIL AND HYDROCHLOROTHIAZIDE 20; 25 MG/1; MG/1
1 TABLET ORAL DAILY
COMMUNITY
Start: 2014-07-02 | End: 2023-06-30

## 2023-05-12 RX ORDER — MULTIVITAMIN
1 TABLET ORAL DAILY
COMMUNITY

## 2023-05-12 RX ORDER — LEVOMEFOLATE/ALGAL OIL 15-90.314
1 CAPSULE ORAL DAILY
COMMUNITY
Start: 2021-04-19 | End: 2023-06-08

## 2023-05-12 RX ORDER — CHOLECALCIFEROL (VITAMIN D3) 25 MCG
1 TABLET ORAL DAILY
COMMUNITY
Start: 2021-04-01

## 2023-05-12 RX ORDER — TAMSULOSIN HYDROCHLORIDE 0.4 MG/1
1 CAPSULE ORAL DAILY
COMMUNITY
Start: 2021-04-07 | End: 2023-05-15 | Stop reason: ALTCHOICE

## 2023-05-12 RX ORDER — ARIPIPRAZOLE 5 MG/1
1 TABLET ORAL DAILY
COMMUNITY
Start: 2020-09-22 | End: 2023-11-21 | Stop reason: SDUPTHER

## 2023-05-12 RX ORDER — LOVASTATIN 40 MG/1
1 TABLET ORAL DAILY
COMMUNITY
Start: 2014-07-02 | End: 2023-06-24

## 2023-05-12 RX ORDER — LEVOTHYROXINE SODIUM 150 UG/1
1 TABLET ORAL DAILY
COMMUNITY
Start: 2022-06-06 | End: 2023-05-15

## 2023-05-12 RX ORDER — FERROUS SULFATE 325(65) MG
1 TABLET ORAL 2 TIMES DAILY
COMMUNITY

## 2023-05-12 RX ORDER — PAROXETINE HYDROCHLORIDE 40 MG/1
1 TABLET, FILM COATED ORAL DAILY
COMMUNITY
Start: 2017-02-14 | End: 2023-08-16

## 2023-05-15 ENCOUNTER — OFFICE VISIT (OUTPATIENT)
Dept: PRIMARY CARE | Facility: CLINIC | Age: 78
End: 2023-05-15
Payer: MEDICARE

## 2023-05-15 VITALS
TEMPERATURE: 97.5 F | SYSTOLIC BLOOD PRESSURE: 122 MMHG | OXYGEN SATURATION: 97 % | WEIGHT: 210.8 LBS | DIASTOLIC BLOOD PRESSURE: 64 MMHG | BODY MASS INDEX: 35.99 KG/M2 | HEIGHT: 64 IN | RESPIRATION RATE: 16 BRPM | HEART RATE: 76 BPM

## 2023-05-15 DIAGNOSIS — R01.1 HEART MURMUR: ICD-10-CM

## 2023-05-15 DIAGNOSIS — E66.01 CLASS 2 SEVERE OBESITY DUE TO EXCESS CALORIES WITH SERIOUS COMORBIDITY AND BODY MASS INDEX (BMI) OF 36.0 TO 36.9 IN ADULT (MULTI): ICD-10-CM

## 2023-05-15 DIAGNOSIS — E03.9 HYPOTHYROIDISM, UNSPECIFIED: ICD-10-CM

## 2023-05-15 DIAGNOSIS — F33.1 MODERATE EPISODE OF RECURRENT MAJOR DEPRESSIVE DISORDER (MULTI): ICD-10-CM

## 2023-05-15 DIAGNOSIS — N18.31 CHRONIC KIDNEY DISEASE, STAGE 3A (MULTI): ICD-10-CM

## 2023-05-15 DIAGNOSIS — I10 ESSENTIAL HYPERTENSION: Primary | ICD-10-CM

## 2023-05-15 DIAGNOSIS — I35.0 AORTIC VALVE STENOSIS, ETIOLOGY OF CARDIAC VALVE DISEASE UNSPECIFIED: ICD-10-CM

## 2023-05-15 DIAGNOSIS — E78.2 MIXED HYPERLIPIDEMIA: ICD-10-CM

## 2023-05-15 DIAGNOSIS — E03.9 ACQUIRED HYPOTHYROIDISM: ICD-10-CM

## 2023-05-15 DIAGNOSIS — D64.9 CHRONIC ANEMIA: ICD-10-CM

## 2023-05-15 PROBLEM — E66.812 CLASS 2 SEVERE OBESITY DUE TO EXCESS CALORIES WITH SERIOUS COMORBIDITY AND BODY MASS INDEX (BMI) OF 36.0 TO 36.9 IN ADULT: Status: ACTIVE | Noted: 2023-05-15

## 2023-05-15 PROCEDURE — 99214 OFFICE O/P EST MOD 30 MIN: CPT | Performed by: FAMILY MEDICINE

## 2023-05-15 PROCEDURE — 1159F MED LIST DOCD IN RCRD: CPT | Performed by: FAMILY MEDICINE

## 2023-05-15 PROCEDURE — 1036F TOBACCO NON-USER: CPT | Performed by: FAMILY MEDICINE

## 2023-05-15 PROCEDURE — 1160F RVW MEDS BY RX/DR IN RCRD: CPT | Performed by: FAMILY MEDICINE

## 2023-05-15 PROCEDURE — 3074F SYST BP LT 130 MM HG: CPT | Performed by: FAMILY MEDICINE

## 2023-05-15 PROCEDURE — 3078F DIAST BP <80 MM HG: CPT | Performed by: FAMILY MEDICINE

## 2023-05-15 RX ORDER — LEVOTHYROXINE SODIUM 150 UG/1
TABLET ORAL
Qty: 90 TABLET | Refills: 1 | Status: SHIPPED | OUTPATIENT
Start: 2023-05-15 | End: 2023-09-15 | Stop reason: SDUPTHER

## 2023-05-15 ASSESSMENT — PATIENT HEALTH QUESTIONNAIRE - PHQ9
2. FEELING DOWN, DEPRESSED OR HOPELESS: NOT AT ALL
SUM OF ALL RESPONSES TO PHQ9 QUESTIONS 1 AND 2: 0
1. LITTLE INTEREST OR PLEASURE IN DOING THINGS: NOT AT ALL

## 2023-05-15 ASSESSMENT — ENCOUNTER SYMPTOMS
OCCASIONAL FEELINGS OF UNSTEADINESS: 0
LOSS OF SENSATION IN FEET: 0
DEPRESSION: 0

## 2023-05-15 NOTE — PROGRESS NOTES
Chief Complaint   Patient presents with    Follow-up     Hypertension, hypothyroidism, hyperlipidemia and labs     Patient is here for follow-up on hypertension and hyperlipidemia. She is not exercising and is adherent to a low-salt diet. Patient denies chest pain, dyspnea, exertional chest pressure/discomfort, near-syncope, orthopnea, palpitations, paroxysmal nocturnal dyspnea, and syncope. Taking his medication regularly with no side effects.    Patient Active Problem List   Diagnosis    Acquired hypothyroidism    Alternating constipation and diarrhea    Arcus senilis, bilateral    Chronic anemia    Chronic constipation with overflow    Chronic diarrhea    Chronic kidney disease, stage 3a    Chronic right shoulder pain    Cornea guttata    Essential hypertension    Gastro-esophageal reflux disease without esophagitis    Glaucoma    Hip bursitis    History of colonic polyps    Knee osteoarthritis    Mixed hyperlipidemia    Moderate episode of recurrent major depressive disorder (CMS/HCC)    Mixed incontinence    Nuclear sclerotic cataract of both eyes    Cataracts, bilateral    Spurious diarrhea    Trochanteric bursitis of right hip    Class 2 severe obesity due to excess calories with serious comorbidity and body mass index (BMI) of 36.0 to 36.9 in adult (CMS/HCC)    Aortic valve stenosis           Past Medical History:   Diagnosis Date    Anxiety disorder, unspecified     Anxiety and depression    Personal history of other diseases of the circulatory system     History of hypertension    Personal history of other diseases of the nervous system and sense organs     History of sleep apnea    Personal history of other endocrine, nutritional and metabolic disease     History of hypercholesterolemia    Personal history of other endocrine, nutritional and metabolic disease     History of hypothyroidism    Personal history of other mental and behavioral disorders     History of depression        Current Outpatient  Medications   Medication Sig Dispense Refill    ARIPiprazole (Abilify) 5 mg tablet Take 1 half tablet by mouth once daily.      famotidine (Pepcid) 20 mg tablet Take 1 tablet (20 mg) by mouth in the morning and 1 tablet (20 mg) before bedtime. 180 tablet 1    ferrous sulfate 325 (65 Fe) MG tablet Take 1 tablet (325 mg) by mouth 2 times a day.      levomefolate-algal oil 15-90.314 mg capsule Take 1 capsule by mouth once daily.      levothyroxine (Synthroid, Levoxyl) 150 mcg tablet TAKE 1 TABLET BY MOUTH EVERY DAY 90 tablet 1    lisinopriL-hydrochlorothiazide 20-25 mg tablet Take 1 tablet by mouth once daily.      lovastatin (Mevacor) 40 mg tablet Take 1 tablet (40 mg) by mouth once daily.      multivitamin tablet Take 1 tablet by mouth once daily.      PARoxetine (Paxil) 40 mg tablet Take 1 tablet (40 mg) by mouth once daily.      traZODone (Desyrel) 50 mg tablet Take 1 tablet (50 mg) by mouth once daily at bedtime. 90 tablet 1    cholecalciferol (Vitamin D-3) 25 MCG (1000 UT) tablet Take 1 tablet (25 mcg) by mouth once daily.       No current facility-administered medications for this visit.       Allergies   Allergen Reactions    Nitrofurantoin Itching    Nitrofurantoin Monohyd/M-Cryst Other     flu-like symptoms    Sulfamethoxazole-Trimethoprim Other     flu-like symptoms    Moxifloxacin Rash       Social History     Tobacco Use    Smoking status: Never    Smokeless tobacco: Never   Vaping Use    Vaping status: Not on file   Substance Use Topics    Alcohol use: Never        Review of Systems - General ROS: negative for - fatigue, fever, malaise, night sweats, sleep disturbance or weight loss  Psychological ROS: negative for - anxiety, concentration difficulties, depression, memory difficulties or sleep disturbances  ENT ROS: negative for - hearing change, nasal discharge, oral lesions, sinus pain, sore throat, tinnitus or vertigo  Allergy and Immunology ROS: negative for - hives, nasal congestion or seasonal  "allergies  Hematological and Lymphatic ROS: negative for - bruising, fatigue, night sweats or pallor  Endocrine ROS: negative for - hot flashes, malaise/lethargy, palpitations, polydipsia/polyuria, skin changes, temperature intolerance or unexpected weight changes  Respiratory ROS: negative for - cough, hemoptysis, pleuritic pain, shortness of breath or wheezing  Cardiovascular ROS: no chest pain or dyspnea on exertion  Gastrointestinal ROS: no abdominal pain, change in bowel habits, or black or bloody stools  Genito-Urinary ROS: no dysuria, trouble voiding, or hematuria  Musculoskeletal ROS: negative for - joint pain, joint stiffness, joint swelling, muscle pain or muscular weakness  Neurological ROS: negative for - dizziness, gait disturbance, headaches, impaired coordination/balance, numbness/tingling, tremors or visual changes  Dermatological ROS: negative for - dry skin, lumps, pruritus or rash    Objective:  Blood pressure 122/64, pulse 76, temperature 36.4 °C (97.5 °F), resp. rate 16, height 1.626 m (5' 4\"), weight 95.6 kg (210 lb 12.8 oz), SpO2 97 %.    Physical Examination: General appearance - alert, well appearing, and in no distress  Mental status - alert, oriented to person, place, and time  Eyes - pupils equal and reactive, extraocular eye movements intact  Ears - bilateral TM's and external ear canals normal  Mouth - mucous membranes moist, pharynx normal without lesions  Neck The neck is supple and free of adenopathy or masses, the thyroid is normal without enlargement or nodules.  Lymphatics - no palpable lymphadenopathy, no hepatosplenomegaly  Chest - clear to auscultation, no wheezes, rales or rhonchi, symmetric air entry  Heart -regular rate and rhythm with 2 to 3/6 systolic maximal in the aortic area and radiating to the carotids without gallop or rub.   Abdomen - soft, nontender, nondistended, no masses or organomegaly  Neurological - alert, oriented, normal speech, no focal findings or " movement disorder noted  Musculoskeletal - no joint tenderness, deformity or swelling  Extremities: peripheral pulses normal, no pedal edema, no clubbing or cyanosis, intact peripheral pulses.  Neurological exam reveals alert, oriented, normal speech, no focal findings or movement disorder noted, cranial nerves II through XII intact, motor and sensory grossly normal bilaterally, no focal deficit..    Orders Only on 05/09/2023   Component Date Value Ref Range Status    Glucose 05/09/2023 92  74 - 99 mg/dL Final    Sodium 05/09/2023 136  136 - 145 mmol/L Final    Potassium 05/09/2023 4.8  3.5 - 5.3 mmol/L Final    Chloride 05/09/2023 104  98 - 107 mmol/L Final    Bicarbonate 05/09/2023 25  21 - 32 mmol/L Final    Anion Gap 05/09/2023 12  10 - 20 mmol/L Final    Urea Nitrogen 05/09/2023 38 (H)  6 - 23 mg/dL Final    Creatinine 05/09/2023 1.28 (H)  0.50 - 1.05 mg/dL Final    GFR Female 05/09/2023 43 (A)  >90 mL/min/1.73m2 Final    Comment:  CALCULATIONS OF ESTIMATED GFR ARE PERFORMED   USING THE 2021 CKD-EPI STUDY REFIT EQUATION   WITHOUT THE RACE VARIABLE FOR THE IDMS-TRACEABLE   CREATININE METHODS.    https://jasn.asnjournals.org/content/early/2021/09/22/ASN.[email or web address]      Calcium 05/09/2023 10.0  8.6 - 10.3 mg/dL Final    Albumin 05/09/2023 4.2  3.4 - 5.0 g/dL Final    Alkaline Phosphatase 05/09/2023 62  33 - 136 U/L Final    Total Protein 05/09/2023 6.9  6.4 - 8.2 g/dL Final    AST 05/09/2023 18  9 - 39 U/L Final    Total Bilirubin 05/09/2023 0.5  0.0 - 1.2 mg/dL Final    ALT (SGPT) 05/09/2023 11  7 - 45 U/L Final    Comment:  Patients treated with Sulfasalazine may generate    falsely decreased results for ALT.           Assessment / Plan:  Problem List Items Addressed This Visit       Acquired hypothyroidism    Relevant Orders    Thyroxine, Free    Thyroid Stimulating Hormone    Follow Up In [facility] - PCP    Aortic valve stenosis    Relevant Orders    Transthoracic Echo (TTE) Complete    Chronic  "anemia    Relevant Orders    CBC and Auto Differential    Chronic kidney disease, stage 3a    Current Assessment & Plan     Chronic Condition Documentation : Stable based on symptoms and exam.  Continue established treatment plan and follow-up at least yearly.           Class 2 severe obesity due to excess calories with serious comorbidity and body mass index (BMI) of 36.0 to 36.9 in adult (CMS/[facility])    Current Assessment & Plan     Continue decrease calorie diet and not more than 1500 calorie per day diet and low-fat diet.  Continue with regular exercise program.  We advised exercise at least 5 days a week for at least 45 minutes and also a minimum of 10,000 steps a day.  The detrimental effects of obesity on health were discussed.           Essential hypertension - Primary    Current Assessment & Plan     Dietary sodium restriction.  Regular aerobic exercise program is recommended to help achieve and maintain normal body weight, fitness and improve lipid balance. .  Dietary changes: Increase soluble fiber  Plant sterols 2grams per day (e.g. Benecol)  Reduce saturated fat, \"trans\" monounsaturated fatty acids, and cholesterol           Relevant Orders    Lipid Panel    Comprehensive Metabolic Panel    Follow Up In [facility] - PCP    CBC and Auto Differential    Mixed hyperlipidemia    Current Assessment & Plan     The nature of cardiac risk has been fully discussed with this patient. Discussed cardiovascular risk analysis and appropriate diet with the need for lifelong measures to reduce the risk. A regular exercise program is recommended to help achieve and maintain normal body weight, fitness and improve lipid balance. Patient education provided. They understand and agree with this course of treatment. They will return with new or worsening symptoms. Patient instructed to remain current with appropriate annual health maintenance.            Relevant Orders    Lipid Panel    Comprehensive Metabolic Panel "    Follow Up In [facility] - PCP    CBC and Auto Differential    Moderate episode of recurrent major depressive disorder (CMS/HCC)    Current Assessment & Plan     Chronic Condition Documentation : Stable based on symptoms and exam.  Continue established treatment plan and follow-up at least yearly.            Other Visit Diagnoses       Heart murmur        Relevant Orders    Transthoracic Echo (TTE) Complete             Outpatient Encounter Medications as of 5/15/2023   Medication Sig Dispense Refill    ARIPiprazole (Abilify) 5 mg tablet Take 1 half tablet by mouth once daily.      famotidine (Pepcid) 20 mg tablet Take 1 tablet (20 mg) by mouth in the morning and 1 tablet (20 mg) before bedtime. 180 tablet 1    ferrous sulfate 325 (65 Fe) MG tablet Take 1 tablet (325 mg) by mouth 2 times a day.      levomefolate-algal oil 15-90.314 mg capsule Take 1 capsule by mouth once daily.      levothyroxine (Synthroid, Levoxyl) 150 mcg tablet TAKE 1 TABLET BY MOUTH EVERY DAY 90 tablet 1    lisinopriL-hydrochlorothiazide 20-25 mg tablet Take 1 tablet by mouth once daily.      lovastatin (Mevacor) 40 mg tablet Take 1 tablet (40 mg) by mouth once daily.      multivitamin tablet Take 1 tablet by mouth once daily.      PARoxetine (Paxil) 40 mg tablet Take 1 tablet (40 mg) by mouth once daily.      traZODone (Desyrel) 50 mg tablet Take 1 tablet (50 mg) by mouth once daily at bedtime. 90 tablet 1    cholecalciferol (Vitamin D-3) 25 MCG (1000 UT) tablet Take 1 tablet (25 mcg) by mouth once daily.      [DISCONTINUED] levothyroxine (Synthroid, Levoxyl) 150 mcg tablet Take 1 tablet (150 mcg) by mouth once daily.      [DISCONTINUED] tamsulosin (Flomax) 0.4 mg 24 hr capsule Take 1 capsule (0.4 mg) by mouth once daily.       No facility-administered encounter medications on file as of 5/15/2023.      Scribe Attestation  By signing my name below, I, Soco Mckenzie   attest that this documentation has been prepared under  the direction and in the presence of Sheldon Up MD.

## 2023-05-15 NOTE — PATIENT INSTRUCTIONS
BMI was above normal measurement. Current weight: 95.6 kg (210 lb 12.8 oz)  Weight change since last visit (-) denotes wt loss -7.2 lbs   Weight loss needed to achieve BMI 25: 65.5 Lbs  Weight loss needed to achieve BMI 30: 36.4 Lbs  Advised to Increase physical activity.

## 2023-06-08 DIAGNOSIS — F33.1 MODERATE EPISODE OF RECURRENT MAJOR DEPRESSIVE DISORDER (MULTI): ICD-10-CM

## 2023-06-08 RX ORDER — LEVOMEFOLATE/ALGAL OIL 15-90.314
CAPSULE ORAL
Qty: 90 CAPSULE | Refills: 1 | Status: SHIPPED | OUTPATIENT
Start: 2023-06-08 | End: 2023-12-05

## 2023-06-08 NOTE — TELEPHONE ENCOUNTER
Rx Refill Request     Name: Marlene M Antonio  :  1945   Medication Name:    Specific Pharmacy location:  [facility]  Date of last appointment:  5/15/2023   Date of next appointment:  9/15/2023   Best number to reach patient:  [phone]

## 2023-06-24 DIAGNOSIS — E78.2 MIXED HYPERLIPIDEMIA: ICD-10-CM

## 2023-06-24 RX ORDER — LOVASTATIN 40 MG/1
TABLET ORAL
Qty: 90 TABLET | Refills: 3 | Status: SHIPPED | OUTPATIENT
Start: 2023-06-24

## 2023-06-30 DIAGNOSIS — I10 ESSENTIAL (PRIMARY) HYPERTENSION: ICD-10-CM

## 2023-06-30 RX ORDER — LISINOPRIL AND HYDROCHLOROTHIAZIDE 20; 25 MG/1; MG/1
TABLET ORAL
Qty: 90 TABLET | Refills: 3 | Status: SHIPPED | OUTPATIENT
Start: 2023-06-30

## 2023-08-16 DIAGNOSIS — F33.1 MAJOR DEPRESSIVE DISORDER, RECURRENT, MODERATE (MULTI): ICD-10-CM

## 2023-08-16 RX ORDER — PAROXETINE HYDROCHLORIDE 40 MG/1
40 TABLET, FILM COATED ORAL DAILY
Qty: 90 TABLET | Refills: 2 | Status: SHIPPED | OUTPATIENT
Start: 2023-08-16 | End: 2024-04-23

## 2023-08-30 RX ORDER — TAMSULOSIN HYDROCHLORIDE 0.4 MG/1
1 CAPSULE ORAL DAILY
COMMUNITY
Start: 2023-06-03 | End: 2023-11-01 | Stop reason: SDUPTHER

## 2023-08-30 RX ORDER — CEPHALEXIN 500 MG/1
CAPSULE ORAL
COMMUNITY
Start: 2023-08-09 | End: 2024-01-02 | Stop reason: SDUPTHER

## 2023-08-30 RX ORDER — METHYLPREDNISOLONE 4 MG/1
TABLET ORAL
COMMUNITY
Start: 2023-03-26 | End: 2023-09-15 | Stop reason: WASHOUT

## 2023-08-30 RX ORDER — IBUPROFEN 600 MG/1
1 TABLET ORAL 3 TIMES DAILY
COMMUNITY
Start: 2023-03-26 | End: 2023-09-15 | Stop reason: WASHOUT

## 2023-09-05 ENCOUNTER — LAB (OUTPATIENT)
Dept: LAB | Facility: LAB | Age: 78
End: 2023-09-05
Payer: MEDICARE

## 2023-09-05 DIAGNOSIS — I10 ESSENTIAL HYPERTENSION: ICD-10-CM

## 2023-09-05 DIAGNOSIS — D64.9 CHRONIC ANEMIA: ICD-10-CM

## 2023-09-05 DIAGNOSIS — E78.2 MIXED HYPERLIPIDEMIA: ICD-10-CM

## 2023-09-05 DIAGNOSIS — E03.9 ACQUIRED HYPOTHYROIDISM: ICD-10-CM

## 2023-09-05 LAB
ALANINE AMINOTRANSFERASE (SGPT) (U/L) IN SER/PLAS: 12 U/L (ref 7–45)
ALBUMIN (G/DL) IN SER/PLAS: 4.2 G/DL (ref 3.4–5)
ALKALINE PHOSPHATASE (U/L) IN SER/PLAS: 57 U/L (ref 33–136)
ANION GAP IN SER/PLAS: 12 MMOL/L (ref 10–20)
ASPARTATE AMINOTRANSFERASE (SGOT) (U/L) IN SER/PLAS: 17 U/L (ref 9–39)
BASOPHILS (10*3/UL) IN BLOOD BY AUTOMATED COUNT: 0.05 X10E9/L (ref 0–0.1)
BASOPHILS/100 LEUKOCYTES IN BLOOD BY AUTOMATED COUNT: 1 % (ref 0–2)
BILIRUBIN TOTAL (MG/DL) IN SER/PLAS: 0.4 MG/DL (ref 0–1.2)
CALCIUM (MG/DL) IN SER/PLAS: 10.1 MG/DL (ref 8.6–10.3)
CARBON DIOXIDE, TOTAL (MMOL/L) IN SER/PLAS: 23 MMOL/L (ref 21–32)
CHLORIDE (MMOL/L) IN SER/PLAS: 108 MMOL/L (ref 98–107)
CHOLESTEROL (MG/DL) IN SER/PLAS: 182 MG/DL (ref 0–199)
CHOLESTEROL IN HDL (MG/DL) IN SER/PLAS: 94.3 MG/DL
CHOLESTEROL/HDL RATIO: 1.9
CREATININE (MG/DL) IN SER/PLAS: 1.42 MG/DL (ref 0.5–1.05)
EOSINOPHILS (10*3/UL) IN BLOOD BY AUTOMATED COUNT: 0.23 X10E9/L (ref 0–0.4)
EOSINOPHILS/100 LEUKOCYTES IN BLOOD BY AUTOMATED COUNT: 4.5 % (ref 0–6)
ERYTHROCYTE DISTRIBUTION WIDTH (RATIO) BY AUTOMATED COUNT: 13.6 % (ref 11.5–14.5)
ERYTHROCYTE MEAN CORPUSCULAR HEMOGLOBIN CONCENTRATION (G/DL) BY AUTOMATED: 31.6 G/DL (ref 32–36)
ERYTHROCYTE MEAN CORPUSCULAR VOLUME (FL) BY AUTOMATED COUNT: 95 FL (ref 80–100)
ERYTHROCYTES (10*6/UL) IN BLOOD BY AUTOMATED COUNT: 3.68 X10E12/L (ref 4–5.2)
GFR FEMALE: 38 ML/MIN/1.73M2
GLUCOSE (MG/DL) IN SER/PLAS: 92 MG/DL (ref 74–99)
HEMATOCRIT (%) IN BLOOD BY AUTOMATED COUNT: 35.1 % (ref 36–46)
HEMOGLOBIN (G/DL) IN BLOOD: 11.1 G/DL (ref 12–16)
IMMATURE GRANULOCYTES/100 LEUKOCYTES IN BLOOD BY AUTOMATED COUNT: 0.2 % (ref 0–0.9)
LDL: 68 MG/DL (ref 0–99)
LEUKOCYTES (10*3/UL) IN BLOOD BY AUTOMATED COUNT: 5.2 X10E9/L (ref 4.4–11.3)
LYMPHOCYTES (10*3/UL) IN BLOOD BY AUTOMATED COUNT: 1.77 X10E9/L (ref 0.8–3)
LYMPHOCYTES/100 LEUKOCYTES IN BLOOD BY AUTOMATED COUNT: 34.3 % (ref 13–44)
MONOCYTES (10*3/UL) IN BLOOD BY AUTOMATED COUNT: 0.44 X10E9/L (ref 0.05–0.8)
MONOCYTES/100 LEUKOCYTES IN BLOOD BY AUTOMATED COUNT: 8.5 % (ref 2–10)
NEUTROPHILS (10*3/UL) IN BLOOD BY AUTOMATED COUNT: 2.66 X10E9/L (ref 1.6–5.5)
NEUTROPHILS/100 LEUKOCYTES IN BLOOD BY AUTOMATED COUNT: 51.5 % (ref 40–80)
PLATELETS (10*3/UL) IN BLOOD AUTOMATED COUNT: 321 X10E9/L (ref 150–450)
POTASSIUM (MMOL/L) IN SER/PLAS: 5.6 MMOL/L (ref 3.5–5.3)
PROTEIN TOTAL: 6.5 G/DL (ref 6.4–8.2)
SODIUM (MMOL/L) IN SER/PLAS: 137 MMOL/L (ref 136–145)
THYROTROPIN (MIU/L) IN SER/PLAS BY DETECTION LIMIT <= 0.05 MIU/L: 0.4 MIU/L (ref 0.44–3.98)
THYROXINE (T4) FREE (NG/DL) IN SER/PLAS: 1.03 NG/DL (ref 0.61–1.12)
TRIGLYCERIDE (MG/DL) IN SER/PLAS: 100 MG/DL (ref 0–149)
UREA NITROGEN (MG/DL) IN SER/PLAS: 49 MG/DL (ref 6–23)
VLDL: 20 MG/DL (ref 0–40)

## 2023-09-05 PROCEDURE — 80053 COMPREHEN METABOLIC PANEL: CPT

## 2023-09-05 PROCEDURE — 84443 ASSAY THYROID STIM HORMONE: CPT

## 2023-09-05 PROCEDURE — 85025 COMPLETE CBC W/AUTO DIFF WBC: CPT

## 2023-09-05 PROCEDURE — 80061 LIPID PANEL: CPT

## 2023-09-05 PROCEDURE — 36415 COLL VENOUS BLD VENIPUNCTURE: CPT

## 2023-09-05 PROCEDURE — 84439 ASSAY OF FREE THYROXINE: CPT

## 2023-09-07 DIAGNOSIS — F33.1 MAJOR DEPRESSIVE DISORDER, RECURRENT, MODERATE (MULTI): ICD-10-CM

## 2023-09-07 RX ORDER — TRAZODONE HYDROCHLORIDE 50 MG/1
50 TABLET ORAL NIGHTLY
Qty: 90 TABLET | Refills: 1 | Status: SHIPPED | OUTPATIENT
Start: 2023-09-07 | End: 2024-02-01

## 2023-09-07 NOTE — TELEPHONE ENCOUNTER
Recent Visits  Date Type Provider Dept   05/15/23 Office Visit [facility] Up [id number]   Showing recent visits within past 540 days and meeting all other requirements  Future Appointments  Date Type Provider Dept   09/15/23 Appointment Sheldon Up MD Do [id number]   Showing future appointments within next 180 days and meeting all other requirements

## 2023-09-15 ENCOUNTER — OFFICE VISIT (OUTPATIENT)
Dept: PRIMARY CARE | Facility: CLINIC | Age: 78
End: 2023-09-15
Payer: MEDICARE

## 2023-09-15 ENCOUNTER — LAB (OUTPATIENT)
Dept: LAB | Facility: LAB | Age: 78
End: 2023-09-15
Payer: MEDICARE

## 2023-09-15 VITALS
TEMPERATURE: 97.5 F | OXYGEN SATURATION: 99 % | HEART RATE: 71 BPM | SYSTOLIC BLOOD PRESSURE: 120 MMHG | WEIGHT: 207.2 LBS | HEIGHT: 64 IN | BODY MASS INDEX: 35.37 KG/M2 | RESPIRATION RATE: 23 BRPM | DIASTOLIC BLOOD PRESSURE: 64 MMHG

## 2023-09-15 DIAGNOSIS — E87.5 HYPERKALEMIA: ICD-10-CM

## 2023-09-15 DIAGNOSIS — E66.01 CLASS 2 SEVERE OBESITY DUE TO EXCESS CALORIES WITH SERIOUS COMORBIDITY AND BODY MASS INDEX (BMI) OF 35.0 TO 35.9 IN ADULT (MULTI): ICD-10-CM

## 2023-09-15 DIAGNOSIS — Z00.00 ROUTINE GENERAL MEDICAL EXAMINATION AT HEALTH CARE FACILITY: Primary | ICD-10-CM

## 2023-09-15 DIAGNOSIS — E03.9 ACQUIRED HYPOTHYROIDISM: ICD-10-CM

## 2023-09-15 DIAGNOSIS — Z23 NEED FOR INFLUENZA VACCINATION: ICD-10-CM

## 2023-09-15 DIAGNOSIS — N18.30 BENIGN HYPERTENSION WITH CKD (CHRONIC KIDNEY DISEASE) STAGE III (MULTI): ICD-10-CM

## 2023-09-15 DIAGNOSIS — N18.31 CHRONIC KIDNEY DISEASE, STAGE 3A (MULTI): ICD-10-CM

## 2023-09-15 DIAGNOSIS — I10 ESSENTIAL HYPERTENSION: ICD-10-CM

## 2023-09-15 DIAGNOSIS — I12.9 BENIGN HYPERTENSION WITH CKD (CHRONIC KIDNEY DISEASE) STAGE III (MULTI): ICD-10-CM

## 2023-09-15 DIAGNOSIS — E78.2 MIXED HYPERLIPIDEMIA: ICD-10-CM

## 2023-09-15 DIAGNOSIS — K21.9 GASTRO-ESOPHAGEAL REFLUX DISEASE WITHOUT ESOPHAGITIS: ICD-10-CM

## 2023-09-15 DIAGNOSIS — F33.1 MODERATE EPISODE OF RECURRENT MAJOR DEPRESSIVE DISORDER (MULTI): ICD-10-CM

## 2023-09-15 DIAGNOSIS — G47.33 OBSTRUCTIVE SLEEP APNEA: ICD-10-CM

## 2023-09-15 DIAGNOSIS — R53.82 CHRONIC FATIGUE: ICD-10-CM

## 2023-09-15 DIAGNOSIS — D64.9 CHRONIC ANEMIA: ICD-10-CM

## 2023-09-15 LAB
ANION GAP IN SER/PLAS: 14 MMOL/L (ref 10–20)
CALCIUM (MG/DL) IN SER/PLAS: 10 MG/DL (ref 8.6–10.3)
CARBON DIOXIDE, TOTAL (MMOL/L) IN SER/PLAS: 23 MMOL/L (ref 21–32)
CHLORIDE (MMOL/L) IN SER/PLAS: 105 MMOL/L (ref 98–107)
CREATININE (MG/DL) IN SER/PLAS: 1.34 MG/DL (ref 0.5–1.05)
GFR FEMALE: 41 ML/MIN/1.73M2
GLUCOSE (MG/DL) IN SER/PLAS: 89 MG/DL (ref 74–99)
POTASSIUM (MMOL/L) IN SER/PLAS: 5.4 MMOL/L (ref 3.5–5.3)
SODIUM (MMOL/L) IN SER/PLAS: 137 MMOL/L (ref 136–145)
UREA NITROGEN (MG/DL) IN SER/PLAS: 41 MG/DL (ref 6–23)

## 2023-09-15 PROCEDURE — 3074F SYST BP LT 130 MM HG: CPT | Performed by: FAMILY MEDICINE

## 2023-09-15 PROCEDURE — 3078F DIAST BP <80 MM HG: CPT | Performed by: FAMILY MEDICINE

## 2023-09-15 PROCEDURE — 1170F FXNL STATUS ASSESSED: CPT | Performed by: FAMILY MEDICINE

## 2023-09-15 PROCEDURE — G0439 PPPS, SUBSEQ VISIT: HCPCS | Performed by: FAMILY MEDICINE

## 2023-09-15 PROCEDURE — 99214 OFFICE O/P EST MOD 30 MIN: CPT | Performed by: FAMILY MEDICINE

## 2023-09-15 PROCEDURE — 90662 IIV NO PRSV INCREASED AG IM: CPT | Performed by: FAMILY MEDICINE

## 2023-09-15 PROCEDURE — G0008 ADMIN INFLUENZA VIRUS VAC: HCPCS | Performed by: FAMILY MEDICINE

## 2023-09-15 PROCEDURE — 80048 BASIC METABOLIC PNL TOTAL CA: CPT

## 2023-09-15 PROCEDURE — 1036F TOBACCO NON-USER: CPT | Performed by: FAMILY MEDICINE

## 2023-09-15 PROCEDURE — 36415 COLL VENOUS BLD VENIPUNCTURE: CPT

## 2023-09-15 PROCEDURE — 1159F MED LIST DOCD IN RCRD: CPT | Performed by: FAMILY MEDICINE

## 2023-09-15 PROCEDURE — 1160F RVW MEDS BY RX/DR IN RCRD: CPT | Performed by: FAMILY MEDICINE

## 2023-09-15 RX ORDER — LEVOTHYROXINE SODIUM 150 UG/1
150 TABLET ORAL DAILY
Qty: 90 TABLET | Refills: 1 | Status: SHIPPED | OUTPATIENT
Start: 2023-09-15 | End: 2024-02-01

## 2023-09-15 RX ORDER — FAMOTIDINE 20 MG/1
20 TABLET, FILM COATED ORAL 2 TIMES DAILY
Qty: 180 TABLET | Refills: 1 | Status: SHIPPED | OUTPATIENT
Start: 2023-09-15 | End: 2024-05-27

## 2023-09-15 ASSESSMENT — PATIENT HEALTH QUESTIONNAIRE - PHQ9
SUM OF ALL RESPONSES TO PHQ9 QUESTIONS 1 AND 2: 2
1. LITTLE INTEREST OR PLEASURE IN DOING THINGS: SEVERAL DAYS
2. FEELING DOWN, DEPRESSED OR HOPELESS: SEVERAL DAYS
10. IF YOU CHECKED OFF ANY PROBLEMS, HOW DIFFICULT HAVE THESE PROBLEMS MADE IT FOR YOU TO DO YOUR WORK, TAKE CARE OF THINGS AT HOME, OR GET ALONG WITH OTHER PEOPLE: SOMEWHAT DIFFICULT

## 2023-09-15 ASSESSMENT — ACTIVITIES OF DAILY LIVING (ADL)
DRESSING: INDEPENDENT
TAKING_MEDICATION: INDEPENDENT
DOING_HOUSEWORK: INDEPENDENT
GROCERY_SHOPPING: INDEPENDENT
BATHING: INDEPENDENT
MANAGING_FINANCES: INDEPENDENT

## 2023-09-15 ASSESSMENT — ENCOUNTER SYMPTOMS
DEPRESSION: 1
LOSS OF SENSATION IN FEET: 0
OCCASIONAL FEELINGS OF UNSTEADINESS: 1

## 2023-09-15 NOTE — ASSESSMENT & PLAN NOTE
"Well-controlled, continue current medications and management.  Dietary sodium restriction.  Regular aerobic exercise program is recommended to help achieve and maintain normal body weight, fitness and improve lipid balance. .  Dietary changes: Increase soluble fiber  Plant sterols 2grams per day (e.g. Benecol)  Reduce saturated fat, \"trans\" monounsaturated fatty acids, and cholesterol  " Patient was scheduled for stress test in July 2020. She was in patient at the time with Covid 19 and test was cancelled. Next office visit is Dec 2020.  Do you want her to reschedule stress prior or wait to been seen in office in Dec? Patient stated she was having no issues and didn't remember needing a stress test.

## 2023-09-15 NOTE — ASSESSMENT & PLAN NOTE
There is high likelihood that the sleep apnea is contributing to her fatigue and we will schedule her for a new sleep study.

## 2023-09-15 NOTE — PATIENT INSTRUCTIONS
BMI was above normal measurement. Current weight: 94 kg (207 lb 3.2 oz)  Weight change since last visit (-) denotes wt loss -3.6 lbs   Weight loss needed to achieve BMI 25: 61.9 Lbs  Weight loss needed to achieve BMI 30: 32.8 Lbs  Advised to Increase physical activity.

## 2023-09-15 NOTE — PROGRESS NOTES
Chief Complaint   Patient presents with    Medicare Annual Wellness Visit Subsequent    Follow-up     Hypertension, Hyperlipidemia, Hypothyroidism and labs    Fatigue     Marlene Alvarez is a 77 y.o. female here for Annual Medicare Wellness Visit.     Patient is here for follow-up on hypertension and hyperlipidemia. She is not exercising and is adherent to a low-salt diet. Patient denies chest pain, dyspnea, exertional chest pressure/discomfort, near-syncope, orthopnea, palpitations, paroxysmal nocturnal dyspnea, and syncope. Taking her medication regularly with no side effects.    She presents for follow up of hypothyroidism. Current symptoms: fatigue and cold intolerance. Patient denies nervousness, palpitations, and weight changes.     Patient complains of fatigue. Symptoms began several months ago. The patient feels the fatigue began with: cold intolerance. Symptoms of her fatigue have been anxiousness, feelings of depression, hypersomnolence, and lack of interest in usual activities. Patient describes the following psychological symptoms: depression and stress in the family. Patient denies constipation, GI blood loss, and significant change in weight. Symptoms have gradually worsened. Symptom severity: struggles to carry out day to day responsibilities.. Previous visits for this problem: none.   She has history of obstructive sleep apnea for which she has been on CPAP machine and has used the same machine for about 10 years.  Her excessive tiredness have been worsening over time.    She notes that she is still having 3-4 bowel movements per day which are described as loose.    Past Medical, Surgical, and Family History reviewed and updated in chart.    Reviewed all medications by prescribing practitioner or clinical pharmacist (such as prescriptions, OTCs, herbal therapies and supplements) and documented in the medical record.    Patient Self Assessment of Health Status  Patient Self Assessment:  "Fair    Nutrition and Exercise  Current Diet: Heart Healthy Diet  Adequate Fluid Intake: Yes  Caffeine: Yes  Exercise Frequency: Regularly    Functional Ability/Level of Safety  Cognitive Impairment Observed: No cognitive impairment observed  Cognitive Impairment Reported: No cognitive impairment reported by patient or family    Home Safety Risk Factors: None    Review of Systems - General ROS: negative for - fever, malaise, night sweats, sleep disturbance or weight loss  ENT ROS: negative for - hearing change, nasal discharge, oral lesions, sinus pain, sore throat, tinnitus or vertigo  Allergy and Immunology ROS: negative for - hives, nasal congestion or seasonal allergies  Hematological and Lymphatic ROS: negative for - bruising, fatigue, night sweats or pallor  Endocrine ROS: negative for - hot flashes, malaise/lethargy, palpitations, polydipsia/polyuria, skin changes, temperature intolerance or unexpected weight changes  Respiratory ROS: negative for - cough, hemoptysis, pleuritic pain, shortness of breath or wheezing  Cardiovascular ROS: no chest pain or dyspnea on exertion  Gastrointestinal ROS: no abdominal pain, or black or bloody stools  Genito-Urinary ROS: no dysuria, trouble voiding, or hematuria  Musculoskeletal ROS: negative for - joint pain, joint stiffness, joint swelling, muscle pain or muscular weakness  Neurological ROS: negative for - dizziness, gait disturbance, headaches, impaired coordination/balance, numbness/tingling, tremors or visual changes  Dermatological ROS: negative for - dry skin, lumps, pruritus or rash    Objective   Vitals:  /64   Pulse 71   Temp 36.4 °C (97.5 °F)   Resp 23   Ht 1.626 m (5' 4\")   Wt 94 kg (207 lb 3.2 oz)   SpO2 99%   BMI 35.57 kg/m²       Physical Examination: General appearance - alert, well appearing, and in no distress  Mental status - alert, oriented to person, place, and time  Eyes - pupils equal and reactive, extraocular eye movements " intact  Ears - bilateral TM's and external ear canals normal  Mouth - mucous membranes moist, pharynx normal without lesions  Neck - supple, no significant adenopathy  Lymphatics - no palpable lymphadenopathy, no hepatosplenomegaly  Chest - clear to auscultation, no wheezes, rales or rhonchi, symmetric air entry  Heart - normal rate, regular rhythm, normal S1, S2, no murmurs, rubs, clicks or gallops  Abdomen - soft, nontender, nondistended, no masses or organomegaly  Neurological - alert, oriented, normal speech, no focal findings or movement disorder noted  Musculoskeletal - no joint tenderness, deformity or swelling  Extremities: peripheral pulses normal, no pedal edema, no clubbing or cyanosis.    Assessment/Plan     Problem List Items Addressed This Visit       Acquired hypothyroidism    Current Assessment & Plan     Well-controlled, continue current medications and management.         Relevant Medications    levothyroxine (Synthroid, Levoxyl) 150 mcg tablet    Other Relevant Orders    Thyroid Stimulating Hormone    Thyroxine, Free    Follow Up In [facility] - PCP - Established    Chronic anemia    Current Assessment & Plan     Stable, continue current medications and management.           Chronic fatigue    Current Assessment & Plan     We discussed possible causes of her fatigue and advised her to increase her fluid intake as well as her electrolytes due to the frequent bowel movements.         Chronic kidney disease, stage 3a (CMS/HCC)    Current Assessment & Plan     Kidney function was decreased on her most recent labs so we will recheck it today.         Class 2 severe obesity due to excess calories with serious comorbidity and body mass index (BMI) of 35.0 to 35.9 in adult (CMS/HCC)    Current Assessment & Plan     Continue decrease calorie diet and not more than 1500 calorie per day diet and low-fat diet.  Continue with regular exercise program.  We advised exercise at least 5 days a week for  "at least 45 minutes and also a minimum of 10,000 steps a day.  The detrimental effects of obesity on health were discussed.         Essential hypertension    Current Assessment & Plan     Well-controlled, continue current medications and management.  Dietary sodium restriction.  Regular aerobic exercise program is recommended to help achieve and maintain normal body weight, fitness and improve lipid balance. .  Dietary changes: Increase soluble fiber  Plant sterols 2grams per day (e.g. Benecol)  Reduce saturated fat, \"trans\" monounsaturated fatty acids, and cholesterol         Relevant Orders    Comprehensive Metabolic Panel    CBC and Auto Differential    Lipid Panel    Follow Up In [facility] - PCP - Established    Gastro-esophageal reflux disease without esophagitis    Current Assessment & Plan     Well-controlled, continue current medications and management.         Relevant Medications    famotidine (Pepcid) 20 mg tablet    Hyperkalemia    Current Assessment & Plan     We will recheck her potassium level today.         Relevant Orders    Basic metabolic panel    Mixed hyperlipidemia    Current Assessment & Plan     Well-controlled, continue current medications and management.         Relevant Orders    Comprehensive Metabolic Panel    CBC and Auto Differential    Lipid Panel    Follow Up In [facility] - PCP - Established    Moderate episode of recurrent major depressive disorder (CMS/HCC)    Current Assessment & Plan     Stable, continue current medications and management.           Routine general medical examination at health care facility - Primary     Other Visit Diagnoses       Need for influenza vaccination        Relevant Orders    Flu vaccine, quadrivalent, high-dose, preservative free, age 65y+ (FLUZONE) (Completed)          Scribe Attestation  By signing my name below, IRalph Scribe   attest that this documentation has been prepared under the direction and in the " presence of Sheldon Up MD.

## 2023-09-15 NOTE — ASSESSMENT & PLAN NOTE
We discussed possible causes of her fatigue and advised her to increase her fluid intake as well as her electrolytes due to the frequent bowel movements.

## 2023-09-27 LAB
APPEARANCE, URINE: CLEAR
BILIRUBIN, URINE: NEGATIVE
BLOOD, URINE: NEGATIVE
COLOR, URINE: YELLOW
GLUCOSE, URINE: NEGATIVE MG/DL
KETONES, URINE: NEGATIVE MG/DL
LEUKOCYTE ESTERASE, URINE: NEGATIVE
NITRITE, URINE: NEGATIVE
PH, URINE: 5 (ref 5–8)
PROTEIN, URINE: NEGATIVE MG/DL
SPECIFIC GRAVITY, URINE: 1.01 (ref 1–1.03)
URINE SPECIMEN TRACKING TO CYTOLOGY OR SURG PATH: NORMAL
UROBILINOGEN, URINE: <2 MG/DL (ref 0–1.9)

## 2023-09-28 LAB — URINE CULTURE: NORMAL

## 2023-10-02 ENCOUNTER — CLINICAL SUPPORT (OUTPATIENT)
Dept: ORTHOPEDIC SURGERY | Facility: CLINIC | Age: 78
End: 2023-10-02
Payer: MEDICARE

## 2023-10-02 DIAGNOSIS — M17.12 PRIMARY LOCALIZED OSTEOARTHRITIS OF LEFT KNEE: Primary | ICD-10-CM

## 2023-10-02 PROCEDURE — 20610 DRAIN/INJ JOINT/BURSA W/O US: CPT | Mod: LT | Performed by: PHYSICIAN ASSISTANT

## 2023-10-02 PROCEDURE — 2500000004 HC RX 250 GENERAL PHARMACY W/ HCPCS (ALT 636 FOR OP/ED): Mod: JZ,PO | Performed by: PHYSICIAN ASSISTANT

## 2023-10-02 PROCEDURE — 2500000004 HC RX 250 GENERAL PHARMACY W/ HCPCS (ALT 636 FOR OP/ED): Mod: JZ | Performed by: PHYSICIAN ASSISTANT

## 2023-10-02 PROCEDURE — 99024 POSTOP FOLLOW-UP VISIT: CPT | Performed by: ORTHOPAEDIC SURGERY

## 2023-10-02 RX ADMIN — Medication 16.8 MG: at 13:02

## 2023-10-02 RX ADMIN — Medication 16.8 MG: at 11:49

## 2023-10-02 NOTE — PROGRESS NOTES
Marlene is here today for Gelsyn-3 injection #3 to the left knee.  She has had some good relief already.  With just a little ecchymosis around previous injection site.  Injection #3 was administered under sterile conditions to the medial aspect of her left patella and tolerated well by patient.  Since she has received good relief we discussed that she could receive these injections again in approximately 6 months.  She will set a follow-up appointment in 6 months otherwise she can follow-up sooner if pain returns.  Gelsyn 3 is 16.8 mg per 2 mL of hyaluronic acid injection.    L Inj/Asp: L knee on 10/2/2023 1:02 PM  Details: 22 G needle, lateral approach  Medications: 16.8 mg sodium hyaluronate 16.8 mg/2 mL  Outcome: tolerated well, no immediate complications  Procedure, treatment alternatives, risks and benefits explained, specific risks discussed. Consent was given by the patient. Immediately prior to procedure a time out was called to verify the correct patient, procedure, equipment, support staff and site/side marked as required. Patient was prepped and draped in the usual sterile fashion.

## 2023-10-06 ENCOUNTER — APPOINTMENT (OUTPATIENT)
Dept: UROLOGY | Facility: CLINIC | Age: 78
End: 2023-10-06
Payer: MEDICARE

## 2023-10-10 ENCOUNTER — TELEPHONE (OUTPATIENT)
Dept: PRIMARY CARE | Facility: CLINIC | Age: 78
End: 2023-10-10
Payer: MEDICARE

## 2023-10-10 NOTE — TELEPHONE ENCOUNTER
CHRISTELLE...  Tish from [facility] called in to inform Dr. Up that patient Marlene had issues with the first recorder/machine for her HSAT and [facility] is going to replace the machine, but they have to receive the faulty machine first prior to sending patient a new machine.  Tish did mention she did speak with patient, and gave patient her personal cell phone# just in case patient's has any issues she will go help patient step up.  Hopefully we should get a report by end of next or early of the following week of patient's HSAT.

## 2023-10-24 ENCOUNTER — APPOINTMENT (OUTPATIENT)
Dept: UROLOGY | Facility: CLINIC | Age: 78
End: 2023-10-24
Payer: MEDICARE

## 2023-10-31 ENCOUNTER — APPOINTMENT (OUTPATIENT)
Dept: UROLOGY | Facility: CLINIC | Age: 78
End: 2023-10-31
Payer: MEDICARE

## 2023-11-01 ENCOUNTER — APPOINTMENT (OUTPATIENT)
Dept: UROLOGY | Facility: CLINIC | Age: 78
End: 2023-11-01
Payer: MEDICARE

## 2023-11-01 ENCOUNTER — PROCEDURE VISIT (OUTPATIENT)
Dept: UROLOGY | Facility: CLINIC | Age: 78
End: 2023-11-01
Payer: MEDICARE

## 2023-11-01 VITALS
SYSTOLIC BLOOD PRESSURE: 131 MMHG | WEIGHT: 207.89 LBS | DIASTOLIC BLOOD PRESSURE: 76 MMHG | HEIGHT: 64 IN | HEART RATE: 82 BPM | BODY MASS INDEX: 35.49 KG/M2 | RESPIRATION RATE: 18 BRPM

## 2023-11-01 DIAGNOSIS — N39.46 MIXED INCONTINENCE: ICD-10-CM

## 2023-11-01 DIAGNOSIS — N39.0 URINARY TRACT INFECTION WITHOUT HEMATURIA, SITE UNSPECIFIED: ICD-10-CM

## 2023-11-01 DIAGNOSIS — N39.0 URINARY TRACT INFECTION WITHOUT HEMATURIA, SITE UNSPECIFIED: Primary | ICD-10-CM

## 2023-11-01 LAB
POC APPEARANCE, URINE: CLEAR
POC BILIRUBIN, URINE: NEGATIVE
POC BLOOD, URINE: NEGATIVE
POC COLOR, URINE: YELLOW
POC GLUCOSE, URINE: NEGATIVE MG/DL
POC KETONES, URINE: NEGATIVE MG/DL
POC LEUKOCYTES, URINE: NEGATIVE
POC NITRITE,URINE: NEGATIVE
POC PH, URINE: 5.5 PH
POC PROTEIN, URINE: NEGATIVE MG/DL
POC SPECIFIC GRAVITY, URINE: <=1.005
POC UROBILINOGEN, URINE: 0.2 EU/DL

## 2023-11-01 PROCEDURE — 99213 OFFICE O/P EST LOW 20 MIN: CPT | Performed by: UROLOGY

## 2023-11-01 PROCEDURE — 51741 ELECTRO-UROFLOWMETRY FIRST: CPT | Performed by: UROLOGY

## 2023-11-01 PROCEDURE — 51798 US URINE CAPACITY MEASURE: CPT | Performed by: UROLOGY

## 2023-11-01 PROCEDURE — 52000 CYSTOURETHROSCOPY: CPT | Performed by: UROLOGY

## 2023-11-01 PROCEDURE — 81003 URINALYSIS AUTO W/O SCOPE: CPT | Performed by: UROLOGY

## 2023-11-01 ASSESSMENT — PAIN SCALES - GENERAL: PAINLEVEL: 0-NO PAIN

## 2023-11-01 NOTE — PATIENT INSTRUCTIONS
Patient Discussion/Summary     It was a pleasure seeing you again today. I am happy to learn that since starting your Flomax and Keflex, you no longer have incomplete emptying or double voiding. You also have not had a UTI in over 3 years. Your urinalysis and urine culture were normal and your urine cytology did not identify any cancer cells. We will continue your daily Flomax and transition to twice a week Keflex.  With respect to urinary incontinence, we will add Myrbetriq at the 50 mg dose to take daily.  I will see you again in 1 year.       This note was created with voice-recognition software and was not corrected for typographical or grammatical errors.

## 2023-11-01 NOTE — PROGRESS NOTES
Provider Impressions     76 year-old white female being referred by Dr. Up for GROSS HEMATURIA. A renal and bladder ultrasound was negative. Patient's  is also our patient. The patient's mother had bladder cancer, presumably from secondhand smoke. Patient's father had prostate cancer. 2 pack year cigarette smoking history.     01/19/15, patient states she only 1 episode of GROSS HEMATURIA with BLOOD CLOTS. Urine cytology is negative. Urine culture is mixed. She has no other complaints.     01/31/15, CYSTOSCOPY was negative. No evidence of tumors or stones. Lower half of the bladder with mild erythema. CYSTOCELE grade 2.     10/19/2015- Established pt here today for f/u and review of labs. She has no current concerns at this time. She indicates no observable blood in her urine and no urinary symptoms such as dysuria, frequency, and urgency.     10/25/2016â€“Established patient here today for her annual review of testing including urine studies. Patient reports she had no symptoms with most recent UTI. This was the only urinary tract infection of a UTI she had this year. Patient denies any current dysuria, frequency, urgency, or hematuria. She does report NOCTURIA 1-2 times nightly. She does have occasional LEAKAGE if she holds her urine to long. UAâ€“positive nitrate, 3+ esterase, greater than 30 WBCs, 0â€“2 rbc's. Urine cultureâ€“greater than 100,000 of Escherichia coliâ€“this has been treated. Urine cytologyâ€“negative for malignant cells, inflammatory cells present.     10/30/17, the patient has no new complaints. Urinalysis is negative and urine culture shows no growth. She will return in 1 year.      MACROBID  BACTRIM  allergies     10/29/18, patient has no new complaints. Urinalysis shows 33 red blood cells and urine cultures negative. In light of the HEMATURIA, I have ordered a cystoscopy and a renal and bladder ultrasound.     11/19/18, cystoscopy today does not reveal any source of hematuria. No  evidence of stones, lesions or tumors. Renal ultrasound shows stable right RENAL CYSTS. Urine cytology is negative. She will return in October 2019.     11/22/19, patient has no complaints. Urinalysis was negative, urine cultures positive for Escherichia coli which we will treat with Keflex, urine cytologies negative. She will return in 1 year.     March 14th 2021, telephone call, urine culture was positive for E. coli which was treated with Keflex     March 24th 2021, patient arrives alone. She is now having recurrent UTIs and incontinence. We will proceed with a cystoscopy, flow studies and discussion of treatment options. I suspect she will benefit from an alpha agent and daily cephalosporin for several months.     April 7, 2021, cystoscopy, flow studies and discussion of treatment options. Cystoscopy reveals severe erythema in the trigone, bladder base and posterior bladder. No glomerulations. Flow rate of 3 cc/s. She continues to complain of poor bladder emptying and double voiding. We will initiate Flomax and Keflex at the 250 mg dose daily. She will return in 3 months.     August 25, 2021, patient arrives alone. She states that since beginning daily Flomax and Keflex at the 250 mg dose, she has not had another UTI, she no longer has poor bladder emptying and she no longer has double voiding. She will continue on this regimen and we will reevaluate in 3 months. Urinalysis and urine culture negative.     November 22, 2021, cystoscopy today shows much improvement with less erythema throughout the bladder. Patient has not had a UTI in over a year. She is doing very well with her regimen of Flomax and daily Keflex 250 mg. We will transition to twice a week Keflex 500 mg. Urine cytology could not rule out neoplasm however there was no evidence on cystoscopy. Flow rate of 11 cc/s, total volume 98 cc, PVR 45 cc. Urine culture no growth, urinalysis no blood, urine cytology was atypical and cannot rule out neoplasm.  She will return in 1 year.     October 7, 2022, cystoscopy today shows significantly less erythema. No stones or tumors. Good flow rate of 10 cc/s, total volume 154 cc,'s PVR 7 cc. She continues on Flomax daily and Keflex 500 mg twice a week without a breakthrough infection in 2 years. Previously, UTI every other month for 18 months. Urinalysis and urine culture negative. She will return in 1 year.    November 1, 2023, cystoscopy today once again identifies significant erythema in the bladder base and trigone.  No evidence of stones or tumors.  Flow rate 16 cc/s, total volume 178 cc, PVR 8 cc.  Patient continues on daily Flomax and Keflex 500 mg twice a week without a breakthrough infection in 3 years.  Previously she experienced a UTI every other month over an 18-month..  Urinalysis and urine culture were both negative.  Urine cytology was negative for malignant cells.  Patient's #1 complaint is incontinence and I will add Myrbetriq at the 50 mg dose to her regimen.  She will return in 1 year.     PLAN:     #1 to return in October 2024 with urine studies, flow rate, PVR and cystoscopy to evaluate severe erythema in the trigone, base and posterior bladder Keflex 250 mg p.o. every 12 hours, 4 doses prophylaxis.     2. Continue Flomax 0.4 mg, Myrbetriq 50 mg p.o. daily and  Keflex 500 mg twice a week     Physical Exam  Vitals and nursing note reviewed. Exam conducted with a chaperone present.   Constitutional:       Appearance: Normal appearance.   HENT:      Head: Normocephalic and atraumatic.   Pulmonary:      Effort: Pulmonary effort is normal.   Abdominal:      Palpations: Abdomen is soft.      Tenderness: There is no abdominal tenderness.   Genitourinary:     General: Normal vulva.      Vagina: No vaginal discharge.   Musculoskeletal:         General: Normal range of motion.      Cervical back: Normal range of motion and neck supple.   Neurological:      General: No focal deficit present.      Mental Status: She  is alert and oriented to person, place, and time.   Psychiatric:         Mood and Affect: Mood normal.         Behavior: Behavior normal.         This note was created with voice-recognition software and was not corrected for typographical or grammatical errors

## 2023-11-01 NOTE — LETTER
November 1, 2023     Sheldon Up MD  [address] [address]    Patient: Marlene Alvarez   YOB: 1945   Date of Visit: 11/1/2023       Dear Dr. Sheldon Up MD:    Thank you for referring Marlene Alvarez to me for evaluation. Below are my notes for this consultation.  If you have questions, please do not hesitate to call me. I look forward to following your patient along with you.       Sincerely,     Gerg Dillon MD      CC: No Recipients  ______________________________________________________________________________________      Provider Impressions     76 year-old white female being referred by Dr. Up for GROSS HEMATURIA. A renal and bladder ultrasound was negative. Patient's  is also our patient. The patient's mother had bladder cancer, presumably from secondhand smoke. Patient's father had prostate cancer. 2 pack year cigarette smoking history.     01/19/15, patient states she only 1 episode of GROSS HEMATURIA with BLOOD CLOTS. Urine cytology is negative. Urine culture is mixed. She has no other complaints.     01/31/15, CYSTOSCOPY was negative. No evidence of tumors or stones. Lower half of the bladder with mild erythema. CYSTOCELE grade 2.     10/19/2015- Established pt here today for f/u and review of labs. She has no current concerns at this time. She indicates no observable blood in her urine and no urinary symptoms such as dysuria, frequency, and urgency.     10/25/2016â€“Established patient here today for her annual review of testing including urine studies. Patient reports she had no symptoms with most recent UTI. This was the only urinary tract infection of a UTI she had this year. Patient denies any current dysuria, frequency, urgency, or hematuria. She does report NOCTURIA 1-2 times nightly. She does have occasional LEAKAGE if she holds her urine to long. UAâ€“positive nitrate, 3+ esterase, greater than 30 WBCs, 0â€“2 rbc's. Urine  cultureâ€“greater than 100,000 of Escherichia coliâ€“this has been treated. Urine cytologyâ€“negative for malignant cells, inflammatory cells present.     10/30/17, the patient has no new complaints. Urinalysis is negative and urine culture shows no growth. She will return in 1 year.      MACROBID  BACTRIM  allergies     10/29/18, patient has no new complaints. Urinalysis shows 33 red blood cells and urine cultures negative. In light of the HEMATURIA, I have ordered a cystoscopy and a renal and bladder ultrasound.     11/19/18, cystoscopy today does not reveal any source of hematuria. No evidence of stones, lesions or tumors. Renal ultrasound shows stable right RENAL CYSTS. Urine cytology is negative. She will return in October 2019.     11/22/19, patient has no complaints. Urinalysis was negative, urine cultures positive for Escherichia coli which we will treat with Keflex, urine cytologies negative. She will return in 1 year.     March 14th 2021, telephone call, urine culture was positive for E. coli which was treated with Keflex     March 24th 2021, patient arrives alone. She is now having recurrent UTIs and incontinence. We will proceed with a cystoscopy, flow studies and discussion of treatment options. I suspect she will benefit from an alpha agent and daily cephalosporin for several months.     April 7, 2021, cystoscopy, flow studies and discussion of treatment options. Cystoscopy reveals severe erythema in the trigone, bladder base and posterior bladder. No glomerulations. Flow rate of 3 cc/s. She continues to complain of poor bladder emptying and double voiding. We will initiate Flomax and Keflex at the 250 mg dose daily. She will return in 3 months.     August 25, 2021, patient arrives alone. She states that since beginning daily Flomax and Keflex at the 250 mg dose, she has not had another UTI, she no longer has poor bladder emptying and she no longer has double voiding. She will continue on this regimen  and we will reevaluate in 3 months. Urinalysis and urine culture negative.     November 22, 2021, cystoscopy today shows much improvement with less erythema throughout the bladder. Patient has not had a UTI in over a year. She is doing very well with her regimen of Flomax and daily Keflex 250 mg. We will transition to twice a week Keflex 500 mg. Urine cytology could not rule out neoplasm however there was no evidence on cystoscopy. Flow rate of 11 cc/s, total volume 98 cc, PVR 45 cc. Urine culture no growth, urinalysis no blood, urine cytology was atypical and cannot rule out neoplasm. She will return in 1 year.     October 7, 2022, cystoscopy today shows significantly less erythema. No stones or tumors. Good flow rate of 10 cc/s, total volume 154 cc,'s PVR 7 cc. She continues on Flomax daily and Keflex 500 mg twice a week without a breakthrough infection in 2 years. Previously, UTI every other month for 18 months. Urinalysis and urine culture negative. She will return in 1 year.    November 1, 2023, cystoscopy today once again identifies significant erythema in the bladder base and trigone.  No evidence of stones or tumors.  Flow rate 16 cc/s, total volume 178 cc, PVR 8 cc.  Patient continues on daily Flomax and Keflex 500 mg twice a week without a breakthrough infection in 3 years.  Previously she experienced a UTI every other month over an 18-month..  Urinalysis and urine culture were both negative.  Urine cytology was negative for malignant cells.  Patient's #1 complaint is incontinence and I will add Myrbetriq at the 50 mg dose to her regimen.  She will return in 1 year.     PLAN:     #1 to return in October 2024 with urine studies, flow rate, PVR and cystoscopy to evaluate severe erythema in the trigone, base and posterior bladder Keflex 250 mg p.o. every 12 hours, 4 doses prophylaxis.     2. Continue Flomax 0.4 mg, Myrbetriq 50 mg p.o. daily and  Keflex 500 mg twice a week     Physical Exam  Vitals and  nursing note reviewed. Exam conducted with a chaperone present.   Constitutional:       Appearance: Normal appearance.   HENT:      Head: Normocephalic and atraumatic.   Pulmonary:      Effort: Pulmonary effort is normal.   Abdominal:      Palpations: Abdomen is soft.      Tenderness: There is no abdominal tenderness.   Genitourinary:     General: Normal vulva.      Vagina: No vaginal discharge.   Musculoskeletal:         General: Normal range of motion.      Cervical back: Normal range of motion and neck supple.   Neurological:      General: No focal deficit present.      Mental Status: She is alert and oriented to person, place, and time.   Psychiatric:         Mood and Affect: Mood normal.         Behavior: Behavior normal.         This note was created with voice-recognition software and was not corrected for typographical or grammatical errors

## 2023-11-01 NOTE — PROGRESS NOTES
"Patient ID: Zoë Alvarez is a 78 y.o. female.  Pt denies any pain today. She states she has not had any recent hospital admits or surgeries. She denies any concerns about falling or safety.    Procedures  Pt took macrodantin 50mg po as prescribed  Anesthesia: Local 2% Lidocaine  Instruments: 6F flexible disposable cystoscope    Pt brought to procedure room and placed in dorsal lithotomy position. Pt draped and prepped in normal sterile fashion. 5ml lidocaine instilled into urethral meatus and 5ml instilled into vagina. Pt tolerated well.    I was present as chaperone for the entirety of the procedure   Barbara Bland  Cystoscopy performed by Dr. Greg Dillon   Bedside \"Time Out\" Verification   Today's Date:  . I attest that this time out verification took place prior to the procedure.   Procedure: cysto   RN/LPN/MA:CIARA   Provider: [facility].   Verified By: RN/SARAHN/MA, ZOË JACOB       and Provider.   Prior to the start of the procedure a time out was taken and the following were verified: the identity of the patient using two patient identifiers, the correct procedure, the correct site marked as indicated, the correct positioning for the patient and the correct equipment was obtained.   Cystoscopy - female  ZOË ALVAREZ      identified using two (2) forms of identification.   Procedure: diagnostic cystourethroscopy.  Procedure Note: Time Started: 1:00PM Time Completed: 1:17 PM  Indications for procedure: irritable voiding symptoms.   Discussed with patient: Risks, benefits, and alternative were discussed in detail. Patient appears to understand and agrees to proceed. Patient has signed the procedure consent form.    CYSTOSCOPY:    Cystoscopy today reveals a normal urethra and bladder neck.  Once inside the bladder, both ureteral orifices were identified and producing urine.  A full examination of the bladder once again reveals significant erythema in the bladder base and trigone.  No evidence of stones or " tumors.  Flow rate 16 cc/s, total volume 178 cc, PVR 8 cc.

## 2023-11-05 RX ORDER — CEPHALEXIN 500 MG/1
500 CAPSULE ORAL 2 TIMES WEEKLY
Qty: 30 CAPSULE | Refills: 3 | Status: SHIPPED | OUTPATIENT
Start: 2023-11-06 | End: 2024-12-30

## 2023-11-05 RX ORDER — TAMSULOSIN HYDROCHLORIDE 0.4 MG/1
0.4 CAPSULE ORAL DAILY
Qty: 90 CAPSULE | Refills: 3 | Status: SHIPPED | OUTPATIENT
Start: 2023-11-05

## 2023-11-05 RX ORDER — MIRABEGRON 50 MG/1
50 TABLET, EXTENDED RELEASE ORAL DAILY
Qty: 90 TABLET | Refills: 3 | Status: SHIPPED | OUTPATIENT
Start: 2023-11-05 | End: 2024-01-02 | Stop reason: WASHOUT

## 2023-11-08 ENCOUNTER — APPOINTMENT (OUTPATIENT)
Dept: UROLOGY | Facility: CLINIC | Age: 78
End: 2023-11-08
Payer: MEDICARE

## 2023-11-20 ENCOUNTER — PATIENT MESSAGE (OUTPATIENT)
Dept: PRIMARY CARE | Facility: CLINIC | Age: 78
End: 2023-11-20
Payer: MEDICARE

## 2023-11-20 DIAGNOSIS — F33.1 MODERATE EPISODE OF RECURRENT MAJOR DEPRESSIVE DISORDER (MULTI): ICD-10-CM

## 2023-11-21 RX ORDER — ARIPIPRAZOLE 5 MG/1
2.5 TABLET ORAL DAILY
Qty: 45 TABLET | Refills: 1 | Status: SHIPPED | OUTPATIENT
Start: 2023-11-21 | End: 2024-02-19 | Stop reason: SDUPTHER

## 2023-11-21 NOTE — TELEPHONE ENCOUNTER
From: Marlene Alvarez  To: Sheldon Up MD  Sent: 11/20/2023 9:29 PM EST  Subject: Arpizol 5mg    Have you received a faxed request from the [facility] requesting a new prescription yet? I need to get a refill going so I don't run out. If they have not contacted you, please write a script for me to pickup and I can scan it over to them.  Thanks,  Marlene Alvarez

## 2023-12-05 DIAGNOSIS — F33.1 MODERATE EPISODE OF RECURRENT MAJOR DEPRESSIVE DISORDER (MULTI): ICD-10-CM

## 2023-12-05 RX ORDER — LEVOMEFOLATE/ALGAL OIL 15-90.314
CAPSULE ORAL
Qty: 90 CAPSULE | Refills: 2 | Status: SHIPPED
Start: 2023-12-05 | End: 2024-01-19 | Stop reason: WASHOUT

## 2023-12-26 ENCOUNTER — LAB (OUTPATIENT)
Dept: LAB | Facility: LAB | Age: 78
End: 2023-12-26
Payer: MEDICARE

## 2023-12-26 DIAGNOSIS — L29.9 PRURITUS: ICD-10-CM

## 2023-12-26 LAB
ALBUMIN SERPL BCP-MCNC: 4 G/DL (ref 3.4–5)
ALP SERPL-CCNC: 55 U/L (ref 33–136)
ALT SERPL W P-5'-P-CCNC: 21 U/L (ref 7–45)
ANION GAP SERPL CALC-SCNC: 13 MMOL/L (ref 10–20)
AST SERPL W P-5'-P-CCNC: 19 U/L (ref 9–39)
BASOPHILS # BLD AUTO: 0.03 X10*3/UL (ref 0–0.1)
BASOPHILS NFR BLD AUTO: 0.6 %
BILIRUB SERPL-MCNC: 0.3 MG/DL (ref 0–1.2)
BUN SERPL-MCNC: 50 MG/DL (ref 6–23)
CALCIUM SERPL-MCNC: 9.6 MG/DL (ref 8.6–10.3)
CHLORIDE SERPL-SCNC: 108 MMOL/L (ref 98–107)
CO2 SERPL-SCNC: 22 MMOL/L (ref 21–32)
CREAT SERPL-MCNC: 1.41 MG/DL (ref 0.5–1.05)
EOSINOPHIL # BLD AUTO: 0.19 X10*3/UL (ref 0–0.4)
EOSINOPHIL NFR BLD AUTO: 3.5 %
ERYTHROCYTE [DISTWIDTH] IN BLOOD BY AUTOMATED COUNT: 14.4 % (ref 11.5–14.5)
GFR SERPL CREATININE-BSD FRML MDRD: 38 ML/MIN/1.73M*2
GLUCOSE SERPL-MCNC: 83 MG/DL (ref 74–99)
HCT VFR BLD AUTO: 32.6 % (ref 36–46)
HGB BLD-MCNC: 10.2 G/DL (ref 12–16)
IMM GRANULOCYTES # BLD AUTO: 0.03 X10*3/UL (ref 0–0.5)
IMM GRANULOCYTES NFR BLD AUTO: 0.6 % (ref 0–0.9)
LYMPHOCYTES # BLD AUTO: 1.46 X10*3/UL (ref 0.8–3)
LYMPHOCYTES NFR BLD AUTO: 27.1 %
MCH RBC QN AUTO: 30.5 PG (ref 26–34)
MCHC RBC AUTO-ENTMCNC: 31.3 G/DL (ref 32–36)
MCV RBC AUTO: 98 FL (ref 80–100)
MONOCYTES # BLD AUTO: 0.4 X10*3/UL (ref 0.05–0.8)
MONOCYTES NFR BLD AUTO: 7.4 %
NEUTROPHILS # BLD AUTO: 3.28 X10*3/UL (ref 1.6–5.5)
NEUTROPHILS NFR BLD AUTO: 60.8 %
NRBC BLD-RTO: 0 /100 WBCS (ref 0–0)
PLATELET # BLD AUTO: 341 X10*3/UL (ref 150–450)
POTASSIUM SERPL-SCNC: 5.2 MMOL/L (ref 3.5–5.3)
PROT SERPL-MCNC: 6.3 G/DL (ref 6.4–8.2)
RBC # BLD AUTO: 3.34 X10*6/UL (ref 4–5.2)
SODIUM SERPL-SCNC: 138 MMOL/L (ref 136–145)
TSH SERPL-ACNC: 1.02 MIU/L (ref 0.44–3.98)
WBC # BLD AUTO: 5.4 X10*3/UL (ref 4.4–11.3)

## 2023-12-26 PROCEDURE — 36415 COLL VENOUS BLD VENIPUNCTURE: CPT

## 2023-12-26 PROCEDURE — 85025 COMPLETE CBC W/AUTO DIFF WBC: CPT

## 2023-12-26 PROCEDURE — 84443 ASSAY THYROID STIM HORMONE: CPT

## 2023-12-26 PROCEDURE — 80053 COMPREHEN METABOLIC PANEL: CPT

## 2024-01-02 ENCOUNTER — OFFICE VISIT (OUTPATIENT)
Dept: PRIMARY CARE | Facility: CLINIC | Age: 79
End: 2024-01-02
Payer: MEDICARE

## 2024-01-02 ENCOUNTER — LAB (OUTPATIENT)
Dept: LAB | Facility: LAB | Age: 79
End: 2024-01-02
Payer: MEDICARE

## 2024-01-02 VITALS
HEIGHT: 64 IN | DIASTOLIC BLOOD PRESSURE: 64 MMHG | HEART RATE: 78 BPM | BODY MASS INDEX: 36.09 KG/M2 | WEIGHT: 211.4 LBS | RESPIRATION RATE: 18 BRPM | TEMPERATURE: 97.3 F | OXYGEN SATURATION: 100 % | SYSTOLIC BLOOD PRESSURE: 120 MMHG

## 2024-01-02 DIAGNOSIS — M35.01 KERATOCONJUNCTIVITIS SICCA (MULTI): ICD-10-CM

## 2024-01-02 DIAGNOSIS — I12.9 BENIGN HYPERTENSION WITH CKD (CHRONIC KIDNEY DISEASE) STAGE III (MULTI): ICD-10-CM

## 2024-01-02 DIAGNOSIS — N18.30 BENIGN HYPERTENSION WITH CKD (CHRONIC KIDNEY DISEASE) STAGE III (MULTI): ICD-10-CM

## 2024-01-02 DIAGNOSIS — I10 ESSENTIAL HYPERTENSION: Primary | ICD-10-CM

## 2024-01-02 DIAGNOSIS — K21.9 GASTRO-ESOPHAGEAL REFLUX DISEASE WITHOUT ESOPHAGITIS: ICD-10-CM

## 2024-01-02 DIAGNOSIS — F33.1 MODERATE EPISODE OF RECURRENT MAJOR DEPRESSIVE DISORDER (MULTI): ICD-10-CM

## 2024-01-02 DIAGNOSIS — E78.2 MIXED HYPERLIPIDEMIA: ICD-10-CM

## 2024-01-02 DIAGNOSIS — D64.9 CHRONIC ANEMIA: ICD-10-CM

## 2024-01-02 DIAGNOSIS — L29.9 PRURITUS OF SKIN: ICD-10-CM

## 2024-01-02 DIAGNOSIS — E66.01 CLASS 2 SEVERE OBESITY DUE TO EXCESS CALORIES WITH SERIOUS COMORBIDITY AND BODY MASS INDEX (BMI) OF 36.0 TO 36.9 IN ADULT (MULTI): ICD-10-CM

## 2024-01-02 DIAGNOSIS — E03.9 ACQUIRED HYPOTHYROIDISM: ICD-10-CM

## 2024-01-02 PROBLEM — H16.229 KERATOCONJUNCTIVITIS SICCA: Status: ACTIVE | Noted: 2024-01-02

## 2024-01-02 LAB
BASOPHILS # BLD AUTO: 0.06 X10*3/UL (ref 0–0.1)
BASOPHILS NFR BLD AUTO: 1 %
EOSINOPHIL # BLD AUTO: 0.15 X10*3/UL (ref 0–0.4)
EOSINOPHIL NFR BLD AUTO: 2.6 %
ERYTHROCYTE [DISTWIDTH] IN BLOOD BY AUTOMATED COUNT: 13.8 % (ref 11.5–14.5)
FERRITIN SERPL-MCNC: 114 NG/ML (ref 8–150)
FOLATE SERPL-MCNC: >22.3 NG/ML
HCT VFR BLD AUTO: 34.4 % (ref 36–46)
HGB BLD-MCNC: 11.2 G/DL (ref 12–16)
IMM GRANULOCYTES # BLD AUTO: 0.02 X10*3/UL (ref 0–0.5)
IMM GRANULOCYTES NFR BLD AUTO: 0.3 % (ref 0–0.9)
IRON SATN MFR SERPL: 17 % (ref 25–45)
IRON SERPL-MCNC: 73 UG/DL (ref 35–150)
LYMPHOCYTES # BLD AUTO: 1.39 X10*3/UL (ref 0.8–3)
LYMPHOCYTES NFR BLD AUTO: 23.7 %
MCH RBC QN AUTO: 30.6 PG (ref 26–34)
MCHC RBC AUTO-ENTMCNC: 32.6 G/DL (ref 32–36)
MCV RBC AUTO: 94 FL (ref 80–100)
MONOCYTES # BLD AUTO: 0.51 X10*3/UL (ref 0.05–0.8)
MONOCYTES NFR BLD AUTO: 8.7 %
NEUTROPHILS # BLD AUTO: 3.73 X10*3/UL (ref 1.6–5.5)
NEUTROPHILS NFR BLD AUTO: 63.7 %
NRBC BLD-RTO: 0 /100 WBCS (ref 0–0)
PLATELET # BLD AUTO: 437 X10*3/UL (ref 150–450)
PROT SERPL-MCNC: 7.1 G/DL (ref 6.4–8.2)
RBC # BLD AUTO: 3.66 X10*6/UL (ref 4–5.2)
TIBC SERPL-MCNC: 423 UG/DL (ref 240–445)
UIBC SERPL-MCNC: 350 UG/DL (ref 110–370)
VIT B12 SERPL-MCNC: 429 PG/ML (ref 211–911)
WBC # BLD AUTO: 5.9 X10*3/UL (ref 4.4–11.3)

## 2024-01-02 PROCEDURE — 82746 ASSAY OF FOLIC ACID SERUM: CPT

## 2024-01-02 PROCEDURE — 84155 ASSAY OF PROTEIN SERUM: CPT

## 2024-01-02 PROCEDURE — 84165 PROTEIN E-PHORESIS SERUM: CPT | Performed by: FAMILY MEDICINE

## 2024-01-02 PROCEDURE — 85025 COMPLETE CBC W/AUTO DIFF WBC: CPT

## 2024-01-02 PROCEDURE — 1126F AMNT PAIN NOTED NONE PRSNT: CPT | Performed by: FAMILY MEDICINE

## 2024-01-02 PROCEDURE — 83550 IRON BINDING TEST: CPT

## 2024-01-02 PROCEDURE — 84165 PROTEIN E-PHORESIS SERUM: CPT

## 2024-01-02 PROCEDURE — 1159F MED LIST DOCD IN RCRD: CPT | Performed by: FAMILY MEDICINE

## 2024-01-02 PROCEDURE — 82607 VITAMIN B-12: CPT

## 2024-01-02 PROCEDURE — 82728 ASSAY OF FERRITIN: CPT

## 2024-01-02 PROCEDURE — 36415 COLL VENOUS BLD VENIPUNCTURE: CPT

## 2024-01-02 PROCEDURE — 1160F RVW MEDS BY RX/DR IN RCRD: CPT | Performed by: FAMILY MEDICINE

## 2024-01-02 PROCEDURE — 86320 SERUM IMMUNOELECTROPHORESIS: CPT | Performed by: FAMILY MEDICINE

## 2024-01-02 PROCEDURE — 3074F SYST BP LT 130 MM HG: CPT | Performed by: FAMILY MEDICINE

## 2024-01-02 PROCEDURE — 86334 IMMUNOFIX E-PHORESIS SERUM: CPT

## 2024-01-02 PROCEDURE — 83540 ASSAY OF IRON: CPT

## 2024-01-02 PROCEDURE — 1036F TOBACCO NON-USER: CPT | Performed by: FAMILY MEDICINE

## 2024-01-02 PROCEDURE — 99214 OFFICE O/P EST MOD 30 MIN: CPT | Performed by: FAMILY MEDICINE

## 2024-01-02 PROCEDURE — 3078F DIAST BP <80 MM HG: CPT | Performed by: FAMILY MEDICINE

## 2024-01-02 RX ORDER — HYDROXYZINE HYDROCHLORIDE 25 MG/1
25 TABLET, FILM COATED ORAL EVERY 8 HOURS PRN
Qty: 60 TABLET | Refills: 1 | Status: SHIPPED | OUTPATIENT
Start: 2024-01-02 | End: 2024-02-01

## 2024-01-02 ASSESSMENT — ENCOUNTER SYMPTOMS
WHEEZING: 0
HEMATURIA: 0
HEADACHES: 0
SORE THROAT: 0
VOMITING: 0
CHILLS: 0
FREQUENCY: 0
SHORTNESS OF BREATH: 0
FEVER: 0
ABDOMINAL PAIN: 0
DYSURIA: 0
DIZZINESS: 0
RHINORRHEA: 0
NUMBNESS: 0
DIARRHEA: 0
PALPITATIONS: 0
CONSTIPATION: 0
TREMORS: 0
COUGH: 0

## 2024-01-02 ASSESSMENT — PATIENT HEALTH QUESTIONNAIRE - PHQ9
SUM OF ALL RESPONSES TO PHQ9 QUESTIONS 1 AND 2: 2
2. FEELING DOWN, DEPRESSED OR HOPELESS: SEVERAL DAYS
10. IF YOU CHECKED OFF ANY PROBLEMS, HOW DIFFICULT HAVE THESE PROBLEMS MADE IT FOR YOU TO DO YOUR WORK, TAKE CARE OF THINGS AT HOME, OR GET ALONG WITH OTHER PEOPLE: NOT DIFFICULT AT ALL
1. LITTLE INTEREST OR PLEASURE IN DOING THINGS: SEVERAL DAYS

## 2024-01-02 NOTE — ASSESSMENT & PLAN NOTE
We will prescribe Hydroxyzine for her to take as needed.  Call if symptoms fail to improve as expected.    Follow-up if persistent or worsening symptoms otherwise when necessary.

## 2024-01-02 NOTE — PATIENT INSTRUCTIONS
BMI was above normal measurement. Current weight: 95.9 kg (211 lb 6.4 oz)  Weight change since last visit (-) denotes wt loss 3.51 lbs   Weight loss needed to achieve BMI 25: 66.1 Lbs  Weight loss needed to achieve BMI 30: 37 Lbs  Advised to Increase physical activity.

## 2024-01-02 NOTE — PROGRESS NOTES
"Subjective   Patient ID: Marlene Alvarez is a 78 y.o. female who presents for Follow-up (Hypertension, Hyperlipidemia, Hypothyroidism and labs) and Itching.    Patient is here for follow-up on hypertension and hyperlipidemia. She has no chest pain, dyspnea, exertional chest pressure/discomfort, near-syncope, orthopnea, palpitations, paroxysmal nocturnal dyspnea, and syncope. Taking her medication regularly with no side effects.    She presents for follow up of hypothyroidism. Current symptoms: cold intolerance. She has no change in energy level, diarrhea, nervousness, palpitations, or weight changes.     She complains of generalized itching. Symptoms began several weeks ago. It is located on her entire body. In the area of itching, she has had no rashes or lesions. Symptoms have been unchanged.        Review of Systems   Constitutional:  Negative for chills and fever.   HENT:  Negative for congestion, ear pain, nosebleeds, rhinorrhea and sore throat.    Respiratory:  Negative for cough, shortness of breath and wheezing.    Cardiovascular:  Negative for chest pain, palpitations and leg swelling.   Gastrointestinal:  Negative for abdominal pain, constipation, diarrhea and vomiting.   Endocrine: Positive for cold intolerance.   Genitourinary:  Negative for dysuria, frequency and hematuria.   Skin:  Positive for rash.   Neurological:  Negative for dizziness, tremors, numbness and headaches.       Objective   /64   Pulse 78   Temp 36.3 °C (97.3 °F)   Resp 18   Ht 1.626 m (5' 4\")   Wt 95.9 kg (211 lb 6.4 oz)   SpO2 100%   BMI 36.29 kg/m²     Physical Exam  Constitutional:       General: She is not in acute distress.     Appearance: Normal appearance.   HENT:      Head: Normocephalic and atraumatic.      Mouth/Throat:      Mouth: Mucous membranes are moist.      Pharynx: Oropharynx is clear. No oropharyngeal exudate or posterior oropharyngeal erythema.   Eyes:      General: No scleral icterus.     Extraocular " Movements: Extraocular movements intact.      Pupils: Pupils are equal, round, and reactive to light.   Cardiovascular:      Rate and Rhythm: Normal rate and regular rhythm.      Pulses: Normal pulses.      Heart sounds: No murmur heard.     No friction rub. No gallop.   Pulmonary:      Effort: Pulmonary effort is normal.      Breath sounds: No wheezing, rhonchi or rales.   Skin:     General: Skin is warm.      Coloration: Skin is not jaundiced or pale.   Neurological:      General: No focal deficit present.      Mental Status: She is alert and oriented to person, place, and time.       Lab on 12/26/2023   Component Date Value Ref Range Status    WBC 12/26/2023 5.4  4.4 - 11.3 x10*3/uL Final    nRBC 12/26/2023 0.0  0.0 - 0.0 /100 WBCs Final    RBC 12/26/2023 3.34 (L)  4.00 - 5.20 x10*6/uL Final    Hemoglobin 12/26/2023 10.2 (L)  12.0 - 16.0 g/dL Final    Hematocrit 12/26/2023 32.6 (L)  36.0 - 46.0 % Final    MCV 12/26/2023 98  80 - 100 fL Final    MCH 12/26/2023 30.5  26.0 - 34.0 pg Final    MCHC 12/26/2023 31.3 (L)  32.0 - 36.0 g/dL Final    RDW 12/26/2023 14.4  11.5 - 14.5 % Final    Platelets 12/26/2023 341  150 - 450 x10*3/uL Final    Neutrophils % 12/26/2023 60.8  40.0 - 80.0 % Final    Immature Granulocytes %, Automated 12/26/2023 0.6  0.0 - 0.9 % Final    Immature Granulocyte Count (IG) includes promyelocytes, myelocytes and metamyelocytes but does not include bands. Percent differential counts (%) should be interpreted in the context of the absolute cell counts (cells/UL).    Lymphocytes % 12/26/2023 27.1  13.0 - 44.0 % Final    Monocytes % 12/26/2023 7.4  2.0 - 10.0 % Final    Eosinophils % 12/26/2023 3.5  0.0 - 6.0 % Final    Basophils % 12/26/2023 0.6  0.0 - 2.0 % Final    Neutrophils Absolute 12/26/2023 3.28  1.60 - 5.50 x10*3/uL Final    Percent differential counts (%) should be interpreted in the context of the absolute cell counts (cells/uL).    Immature Granulocytes Absolute, Au* 12/26/2023 0.03   0.00 - 0.50 x10*3/uL Final    Lymphocytes Absolute 12/26/2023 1.46  0.80 - 3.00 x10*3/uL Final    Monocytes Absolute 12/26/2023 0.40  0.05 - 0.80 x10*3/uL Final    Eosinophils Absolute 12/26/2023 0.19  0.00 - 0.40 x10*3/uL Final    Basophils Absolute 12/26/2023 0.03  0.00 - 0.10 x10*3/uL Final    Glucose 12/26/2023 83  74 - 99 mg/dL Final    Sodium 12/26/2023 138  136 - 145 mmol/L Final    Potassium 12/26/2023 5.2  3.5 - 5.3 mmol/L Final    Chloride 12/26/2023 108 (H)  98 - 107 mmol/L Final    Bicarbonate 12/26/2023 22  21 - 32 mmol/L Final    Anion Gap 12/26/2023 13  10 - 20 mmol/L Final    Urea Nitrogen 12/26/2023 50 (H)  6 - 23 mg/dL Final    Creatinine 12/26/2023 1.41 (H)  0.50 - 1.05 mg/dL Final    eGFR 12/26/2023 38 (L)  >60 mL/min/1.73m*2 Final    Calculations of estimated GFR are performed using the 2021 CKD-EPI Study Refit equation without the race variable for the IDMS-Traceable creatinine methods.  https://jasn.asnjournals.org/content/early/2021/09/22/ASN.3585010339    Calcium 12/26/2023 9.6  8.6 - 10.3 mg/dL Final    Albumin 12/26/2023 4.0  3.4 - 5.0 g/dL Final    Alkaline Phosphatase 12/26/2023 55  33 - 136 U/L Final    Total Protein 12/26/2023 6.3 (L)  6.4 - 8.2 g/dL Final    AST 12/26/2023 19  9 - 39 U/L Final    Bilirubin, Total 12/26/2023 0.3  0.0 - 1.2 mg/dL Final    ALT 12/26/2023 21  7 - 45 U/L Final    Patients treated with Sulfasalazine may generate falsely decreased results for ALT.    Thyroid Stimulating Hormone 12/26/2023 1.02  0.44 - 3.98 mIU/L Final        Assessment/Plan   Problem List Items Addressed This Visit       Acquired hypothyroidism     Well-controlled, continue current medications and management.         Relevant Orders    Follow Up In [facility] - PCP - Established    Thyroid Stimulating Hormone    Thyroxine, Free    Benign hypertension with CKD (chronic kidney disease) stage III (CMS/HCC)     Stable, continue current medications and management.         Chronic  "anemia     We will refer her to the Hematologist for further evaluation.         Relevant Orders    Serum Protein Electrophoresis + Immunofixation    Referral to Hematology and Oncology    CBC and Auto Differential (Completed)    Ferritin    Folate (Completed)    Vitamin B12 (Completed)    Iron and TIBC (Completed)    Class 2 severe obesity due to excess calories with serious comorbidity and body mass index (BMI) of 36.0 to 36.9 in adult (CMS/[facility])     Continue decrease calorie diet and not more than 1500 calorie per day diet and low-fat diet.  Continue with regular exercise program.  We advised exercise at least 5 days a week for at least 45 minutes and also a minimum of 10,000 steps a day.  The detrimental effects of obesity on health were discussed.         Essential hypertension - Primary     Well-controlled, continue current medications and management.  Dietary sodium restriction.  Regular aerobic exercise program is recommended to help achieve and maintain normal body weight, fitness and improve lipid balance. .  Dietary changes: Increase soluble fiber  Plant sterols 2grams per day (e.g. Benecol)  Reduce saturated fat, \"trans\" monounsaturated fatty acids, and cholesterol         Relevant Orders    Follow Up In [facility] - PCP - Established    CBC and Auto Differential    Comprehensive Metabolic Panel    Lipid Panel    Gastro-esophageal reflux disease without esophagitis     Well-controlled, continue current medications and management.         Keratoconjunctivitis sicca (CMS/[facility])     Chronic Condition Documentation : Stable based on symptoms and exam.  Continue established treatment plan and follow-up at least yearly.         Mixed hyperlipidemia     The nature of cardiac risk has been fully discussed with this patient. Discussed cardiovascular risk analysis and appropriate diet with the need for lifelong measures to reduce the risk. A regular exercise program is recommended to help achieve and " maintain normal body weight, fitness and improve lipid balance. Patient education provided. They understand and agree with this course of treatment. They will return with new or worsening symptoms. Patient instructed to remain current with appropriate annual health maintenance.          Relevant Orders    Follow Up In [facility] - PCP - Established    CBC and Auto Differential    Comprehensive Metabolic Panel    Lipid Panel    Moderate episode of recurrent major depressive disorder (CMS/HCC)     Stable, continue current medications and management.         Pruritus of skin     We will prescribe Hydroxyzine for her to take as needed.  Call if symptoms fail to improve as expected.    Follow-up if persistent or worsening symptoms otherwise when necessary.         Relevant Medications    hydrOXYzine HCL (Atarax) 25 mg tablet     Scribe Attestation  By signing my name below, I, Soco Mckenzie   attest that this documentation has been prepared under the direction and in the presence of Sheldon Up MD.

## 2024-01-07 LAB
ALBUMIN: 4.3 G/DL (ref 3.4–5)
ALPHA 1 GLOBULIN: 0.3 G/DL (ref 0.2–0.6)
ALPHA 2 GLOBULIN: 0.9 G/DL (ref 0.4–1.1)
BETA GLOBULIN: 0.9 G/DL (ref 0.5–1.2)
GAMMA GLOBULIN: 0.7 G/DL (ref 0.5–1.4)
IMMUNOFIXATION COMMENT: NORMAL
PATH REVIEW - SERUM IMMUNOFIXATION: NORMAL
PATH REVIEW-SERUM PROTEIN ELECTROPHORESIS: NORMAL
PROTEIN ELECTROPHORESIS COMMENT: NORMAL

## 2024-01-12 ENCOUNTER — APPOINTMENT (OUTPATIENT)
Dept: PRIMARY CARE | Facility: CLINIC | Age: 79
End: 2024-01-12
Payer: MEDICARE

## 2024-01-17 NOTE — PROGRESS NOTES
Patient ID: Marlene Alvarez is a 78 y.o. female.  Referring Physician: Sheldon Up MD  [address] [place] [address]  Primary Care Provider: Sheldon Up MD      Subjective    HPI  Ms. Marlene Alvarez is a 79 y/o F presenting for initial consultation at the request of Dr. Up, for chronic anemia.     Most recent blood work on 1/2/2024 shows hemoglobin 11.2. Looking back a year ago she was 10.7 and 2 years ago was 11.34. MCV has always been normal. WBC and platelets also in the normal range. She did have iron studies, folate and B12 which were all normal. TSH checked in December 2023 was normal. Chemistry panel checked on 12/26/2023 does show some baseline creatinine elevation of 1.41. It looks like her creatinine has ranged from 1.4 to 1.5 over the last year. SPEP checked 1/2/2024 was negative.     Patient reports that she has had a 5-week history of itchiness. Denies any new rash and no history of rashes. She denies using any new products and no new medications or supplements. She has 2 cats. She denies any history of pruritus with her 4 pregnancies. No personal or FHx of blood clots or stroke. Denies any new chest pain, palpitations, cough or shortness of breath. No abdominal pain or discomfort. No new swelling. She has had a cholecystectomy. No other complaints today.     Patient's past medical history, surgical history, family history and social history reviewed.     Objective   Vitals:    01/19/24 1136   BP: 143/68   Pulse: 82   Resp: 16   Temp: 36.8 °C (98.2 °F)   SpO2: 97%      Review of Systems:   Review of Systems:    Positive per HPI, otherwise negative.     Physical Exam:   Constitutional: Patient appears in no acute distress.   Sitting comfortably in chair.  Eyes: EOMI, clear sclera  ENMT: mucous membranes moist, no apparent injury  Head/Neck: Neck supple, no JVD  Respiratory/Thorax: Patent airways, CTAB, normal  breath sounds, no increased work of breathing  Cardiovascular:  Regular, rate and rhythm, no murmurs  Gastrointestinal: Nondistended, soft, non-tender,  no rebound tenderness or guarding, no masses palpable  Extremities: normal extremities, no cyanosis edema,  no swelling  Neurological: alert and oriented x3, nonfocal, normal  speech and hearing  Lymphatic: No palpable lymphadenopathy in cervical,  axillary  lymph nodes.  Spleen appears normal size.  Psychological: Appropriate mood and behavior, normal  affect  Skin: Warm and dry, no lesions, no rashes     Performance Status:  Symptomatic; fully ambulatory    Labs/Imaging/Pathology: personally reviewed reports and images in Epic electronic medical record system. Pertinent results as it related to the plan represented in below in assessment and plan.      Assessment/Plan    1. Anemia  2. CKD  3. Pruritus    - Patient has longstanding history of mild normocytic anemia for at least the last 1.5 years.  - Given the CDK likely multifactorial in the setting CKD versus any chronic inflammation, however she does report new pruritus over the last 5 weeks and we discussed different etiologies. She does not have a new rash, no new changes in medications or supplements or topical ointments or   changes in body wash, deodorant or lotion.    - She has never had pruritus with other health problems in the past.   - She does have a history of cholecystectomy. Denies any abdominal symptoms.   - No pruritus with any of her pregnancies.  - We discussed there could be a link with her anemia if she had an underlying myeloproliferative neoplasm.  - Today we will assess her anemia with a reticulocyte count to assess for marrow function.  - We will plan to check a kappa/lamda ratio to complete workup for a plasma cell dyscrasia.  - We will repeat a chemistry panel to ensure there is no other pathology from the liver and repeat kidney function.   - If all of the blood work is unremarkable we could consider a bone marrow biopsy to assess further for an  underlying MPN.   - RTC as a phone visit to discuss results in 1 week.     RTC as a phone visit to discuss results in 1 week. This note has been transcribed using a medical scribe and there is a possibility of unintentional typing misprints.     Diagnoses and all orders for this visit:  Chronic pruritus  -     CBC and Auto Differential; Future  -     Comprehensive Metabolic Panel; Future  -     Haptoglobin; Future  -     Reticulocytes; Future  -     [place]/Lambda Free Light Chain, Serum; Future  Chronic anemia  -     Referral to Hematology and Oncology  -     CBC and Auto Differential; Future  -     Comprehensive Metabolic Panel; Future  -     Haptoglobin; Future  -     Reticulocytes; Future  -     [place]/Lambda Free Light Chain, Serum; Future    Dottie Knowles MD  Hematology/Oncology  [facility]    Scribe Attestation  By signing my name below, I, Soco Alvarez attest that this documentation has been prepared under the direction and in the presence of Dottie Knowles MD.

## 2024-01-19 ENCOUNTER — LAB (OUTPATIENT)
Dept: LAB | Facility: CLINIC | Age: 79
End: 2024-01-19
Payer: MEDICARE

## 2024-01-19 ENCOUNTER — OFFICE VISIT (OUTPATIENT)
Dept: HEMATOLOGY/ONCOLOGY | Facility: CLINIC | Age: 79
End: 2024-01-19
Payer: MEDICARE

## 2024-01-19 VITALS
WEIGHT: 216.27 LBS | HEART RATE: 82 BPM | DIASTOLIC BLOOD PRESSURE: 68 MMHG | RESPIRATION RATE: 16 BRPM | OXYGEN SATURATION: 97 % | TEMPERATURE: 98.2 F | BODY MASS INDEX: 37.12 KG/M2 | SYSTOLIC BLOOD PRESSURE: 143 MMHG

## 2024-01-19 DIAGNOSIS — L29.9 CHRONIC PRURITUS: ICD-10-CM

## 2024-01-19 DIAGNOSIS — D64.9 CHRONIC ANEMIA: ICD-10-CM

## 2024-01-19 DIAGNOSIS — L29.9 CHRONIC PRURITUS: Primary | ICD-10-CM

## 2024-01-19 LAB
ALBUMIN SERPL BCP-MCNC: 4.3 G/DL (ref 3.4–5)
ALP SERPL-CCNC: 61 U/L (ref 33–136)
ALT SERPL W P-5'-P-CCNC: 16 U/L (ref 7–45)
ANION GAP SERPL CALC-SCNC: 13 MMOL/L (ref 10–20)
AST SERPL W P-5'-P-CCNC: 24 U/L (ref 9–39)
BASOPHILS # BLD AUTO: 0.06 X10*3/UL (ref 0–0.1)
BASOPHILS NFR BLD AUTO: 0.8 %
BILIRUB SERPL-MCNC: 0.3 MG/DL (ref 0–1.2)
BUN SERPL-MCNC: 53 MG/DL (ref 6–23)
CALCIUM SERPL-MCNC: 9.9 MG/DL (ref 8.6–10.3)
CHLORIDE SERPL-SCNC: 105 MMOL/L (ref 98–107)
CO2 SERPL-SCNC: 24 MMOL/L (ref 21–32)
CREAT SERPL-MCNC: 1.56 MG/DL (ref 0.5–1.05)
EGFRCR SERPLBLD CKD-EPI 2021: 34 ML/MIN/1.73M*2
EOSINOPHIL # BLD AUTO: 0.06 X10*3/UL (ref 0–0.4)
EOSINOPHIL NFR BLD AUTO: 0.8 %
ERYTHROCYTE [DISTWIDTH] IN BLOOD BY AUTOMATED COUNT: 13.8 % (ref 11.5–14.5)
GLUCOSE SERPL-MCNC: 101 MG/DL (ref 74–99)
HCT VFR BLD AUTO: 32.9 % (ref 36–46)
HGB BLD-MCNC: 10.7 G/DL (ref 12–16)
HGB RETIC QN: 33 PG (ref 28–38)
IMM GRANULOCYTES # BLD AUTO: 0.02 X10*3/UL (ref 0–0.5)
IMM GRANULOCYTES NFR BLD AUTO: 0.3 % (ref 0–0.9)
IMMATURE RETIC FRACTION: 7.4 %
LYMPHOCYTES # BLD AUTO: 0.91 X10*3/UL (ref 0.8–3)
LYMPHOCYTES NFR BLD AUTO: 12.2 %
MCH RBC QN AUTO: 30.7 PG (ref 26–34)
MCHC RBC AUTO-ENTMCNC: 32.5 G/DL (ref 32–36)
MCV RBC AUTO: 94 FL (ref 80–100)
MONOCYTES # BLD AUTO: 0.36 X10*3/UL (ref 0.05–0.8)
MONOCYTES NFR BLD AUTO: 4.8 %
NEUTROPHILS # BLD AUTO: 6.04 X10*3/UL (ref 1.6–5.5)
NEUTROPHILS NFR BLD AUTO: 81.1 %
PLATELET # BLD AUTO: 328 X10*3/UL (ref 150–450)
POTASSIUM SERPL-SCNC: 5.4 MMOL/L (ref 3.5–5.3)
PROT SERPL-MCNC: 7.1 G/DL (ref 6.4–8.2)
RBC # BLD AUTO: 3.49 X10*6/UL (ref 4–5.2)
RETICS #: 0.07 X10*6/UL (ref 0.02–0.11)
RETICS/RBC NFR AUTO: 2 % (ref 0.5–2)
SODIUM SERPL-SCNC: 137 MMOL/L (ref 136–145)
WBC # BLD AUTO: 7.5 X10*3/UL (ref 4.4–11.3)

## 2024-01-19 PROCEDURE — 83010 ASSAY OF HAPTOGLOBIN QUANT: CPT | Performed by: INTERNAL MEDICINE

## 2024-01-19 PROCEDURE — 3077F SYST BP >= 140 MM HG: CPT | Performed by: INTERNAL MEDICINE

## 2024-01-19 PROCEDURE — 1036F TOBACCO NON-USER: CPT | Performed by: INTERNAL MEDICINE

## 2024-01-19 PROCEDURE — 85025 COMPLETE CBC W/AUTO DIFF WBC: CPT

## 2024-01-19 PROCEDURE — 36415 COLL VENOUS BLD VENIPUNCTURE: CPT

## 2024-01-19 PROCEDURE — 84075 ASSAY ALKALINE PHOSPHATASE: CPT

## 2024-01-19 PROCEDURE — 85045 AUTOMATED RETICULOCYTE COUNT: CPT | Performed by: INTERNAL MEDICINE

## 2024-01-19 PROCEDURE — 99203 OFFICE O/P NEW LOW 30 MIN: CPT | Performed by: INTERNAL MEDICINE

## 2024-01-19 PROCEDURE — 1159F MED LIST DOCD IN RCRD: CPT | Performed by: INTERNAL MEDICINE

## 2024-01-19 PROCEDURE — 99213 OFFICE O/P EST LOW 20 MIN: CPT | Performed by: INTERNAL MEDICINE

## 2024-01-19 PROCEDURE — 83521 IG LIGHT CHAINS FREE EACH: CPT | Performed by: INTERNAL MEDICINE

## 2024-01-19 PROCEDURE — 1126F AMNT PAIN NOTED NONE PRSNT: CPT | Performed by: INTERNAL MEDICINE

## 2024-01-19 PROCEDURE — 3078F DIAST BP <80 MM HG: CPT | Performed by: INTERNAL MEDICINE

## 2024-01-19 PROCEDURE — 1160F RVW MEDS BY RX/DR IN RCRD: CPT | Performed by: INTERNAL MEDICINE

## 2024-01-19 ASSESSMENT — PAIN SCALES - GENERAL: PAINLEVEL: 0-NO PAIN

## 2024-01-21 LAB
KAPPA LC SERPL-MCNC: 6.13 MG/DL (ref 0.33–1.94)
KAPPA LC/LAMBDA SER: 2.25 {RATIO} (ref 0.26–1.65)
LAMBDA LC SERPL-MCNC: 2.72 MG/DL (ref 0.57–2.63)

## 2024-01-22 LAB — HAPTOGLOB SERPL-MCNC: 215 MG/DL (ref 37–246)

## 2024-01-25 NOTE — PROGRESS NOTES
Consent:  Verbal consent was requested and obtained from patient on this date for a telehealth visit.    Patient ID: Marlene Alvarez is a 78 y.o. female.    Subjective    HPI  Ms. Marlene Alvarez is a 77 y/o F presenting for initial consultation at the request of Dr. Up, for chronic anemia.      Most recent blood work on 1/2/2024 shows hemoglobin 11.2. Looking back a year ago she was 10.7 and 2 years ago was 11.34. MCV has always been normal. WBC and platelets also in the normal range. She did have iron studies, folate and B12 which were all normal. TSH checked in December 2023 was normal. Chemistry panel checked on 12/26/2023 does show some baseline creatinine elevation of 1.41. It looks like her creatinine has ranged from 1.4 to 1.5 over the last year. SPEP checked 1/2/2024 was negative.      Patient reports that she has had a 5-week history of itchiness. Denies any new rash and no history of rashes. She denies using any new products and no new medications or supplements. She has 2 cats. She denies any history of pruritus with her 4 pregnancies. No personal or FHx of blood clots or stroke. Denies any new chest pain, palpitations, cough or shortness of breath. No abdominal pain or discomfort. No new swelling. She has had a cholecystectomy. No other complaints today.      Interval Events:  1/26/2024: A telephone visit (audio only) between the patient at home and the provider at [facility] was utilized to provide this telehealth service.     Patient presents for follow-up for anemia. Labs reviewed from last week kappa/lambda 2.25 ratio. Retic index with hypoproliferation. Iron/folate/B12 WNL. No new complaints.          Patient's past medical history, surgical history, family history and social history reviewed.     Objective    There were no vitals taken for this visit.     Review of Systems:   Review of Systems:    Positive per HPI, otherwise negative.     Physical Exam:   Exam deferred due  to telephone call.    Performance Status:  Symptomatic; fully ambulatory    Labs/Imaging/Pathology: personally reviewed reports and images in Epic electronic medical record system. Pertinent results as it related to the plan represented in below in assessment and plan.      Assessment/Plan    1. Anemia  2. CKD  3. Pruritus     - Patient has longstanding history of mild normocytic anemia for at least the last 1.5 years.  - Given the CDK likely multifactorial in the setting CKD versus any chronic inflammation, however she does report new pruritus over the last 5 weeks and we discussed different etiologies. She does not have a new rash, no new changes in medications or supplements or topical ointments or   changes in body wash, deodorant or lotion.    - She has never had pruritus with other health problems in the past.   - She does have a history of cholecystectomy. Denies any abdominal symptoms.   - No pruritus with any of her pregnancies.  - We discussed there could be a link with her anemia if she had an underlying myeloproliferative neoplasm.  - Today we will assess her anemia with a reticulocyte count to assess for marrow function.  - We will plan to check a kappa/lamda ratio to complete workup for a plasma cell dyscrasia.  - We will repeat a chemistry panel to ensure there is no other pathology from the liver and repeat kidney function.   - If all of the blood work is unremarkable we could consider a bone marrow biopsy to assess further for an underlying MPN.   - RTC as a phone visit to discuss results in 1 week.     1/26/24: Telephone call  - we discussed anemia likely multifactorial   - likely some component of CKD and will check EPO level next visit  - we discussed retic index consistent with hypoproliferation and would recommend bone marrow evaluation as primary bone marrow dysfunction also in differential  - pt prefers to watch and wait and if Hgb drops below 10 would consider marrow to help determine  etiology and if benefit from NOEMÍ    RTC 6 mo after labs. This note has been transcribed using a medical scribe and there is a possibility of unintentional typing misprints.      Diagnoses and all orders for this visit:  Chronic anemia  -     Clinic Appointment Request; Future  -     CBC and Auto Differential; Future  -     Comprehensive metabolic panel; Future  -     Iron and TIBC; Future  -     Vitamin B12; Future  -     Erythropoietin; Future    Dottie Knowles MD  Hematology/Oncology  [facility]    Scribe Attestation  By signing my name below, IJulieta Scribe attest that this documentation has been prepared under the direction and in the presence of Dottie Knowles MD.

## 2024-01-26 ENCOUNTER — OFFICE VISIT (OUTPATIENT)
Dept: HEMATOLOGY/ONCOLOGY | Facility: CLINIC | Age: 79
End: 2024-01-26
Payer: MEDICARE

## 2024-01-26 DIAGNOSIS — D64.9 CHRONIC ANEMIA: Primary | ICD-10-CM

## 2024-01-26 PROCEDURE — 1036F TOBACCO NON-USER: CPT | Performed by: INTERNAL MEDICINE

## 2024-01-26 PROCEDURE — 1160F RVW MEDS BY RX/DR IN RCRD: CPT | Performed by: INTERNAL MEDICINE

## 2024-01-26 PROCEDURE — 99213 OFFICE O/P EST LOW 20 MIN: CPT | Mod: G0 | Performed by: INTERNAL MEDICINE

## 2024-01-26 PROCEDURE — 1126F AMNT PAIN NOTED NONE PRSNT: CPT | Performed by: INTERNAL MEDICINE

## 2024-01-26 PROCEDURE — 1159F MED LIST DOCD IN RCRD: CPT | Performed by: INTERNAL MEDICINE

## 2024-01-26 PROCEDURE — 99213 OFFICE O/P EST LOW 20 MIN: CPT | Performed by: INTERNAL MEDICINE

## 2024-01-29 ENCOUNTER — APPOINTMENT (OUTPATIENT)
Dept: PRIMARY CARE | Facility: CLINIC | Age: 79
End: 2024-01-29
Payer: MEDICARE

## 2024-02-01 DIAGNOSIS — F33.1 MAJOR DEPRESSIVE DISORDER, RECURRENT, MODERATE (MULTI): ICD-10-CM

## 2024-02-01 DIAGNOSIS — L29.9 PRURITUS OF SKIN: ICD-10-CM

## 2024-02-01 DIAGNOSIS — E03.9 ACQUIRED HYPOTHYROIDISM: ICD-10-CM

## 2024-02-01 RX ORDER — HYDROXYZINE HYDROCHLORIDE 25 MG/1
25 TABLET, FILM COATED ORAL 2 TIMES DAILY PRN
Qty: 60 TABLET | Refills: 1 | Status: SHIPPED | OUTPATIENT
Start: 2024-02-01 | End: 2024-03-26 | Stop reason: WASHOUT

## 2024-02-01 RX ORDER — TRAZODONE HYDROCHLORIDE 50 MG/1
50 TABLET ORAL NIGHTLY
Qty: 90 TABLET | Refills: 2 | Status: SHIPPED | OUTPATIENT
Start: 2024-02-01

## 2024-02-01 RX ORDER — LEVOTHYROXINE SODIUM 150 UG/1
150 TABLET ORAL DAILY
Qty: 90 TABLET | Refills: 2 | Status: SHIPPED | OUTPATIENT
Start: 2024-02-01

## 2024-02-01 NOTE — TELEPHONE ENCOUNTER
RX FOR HYDROXYZINE SENT 24 -QTY 60 W/1RF   -- TRAZODONE- 23 QTY 90 W/1RF   -- LEVOTHYROXINE 150 MCG -09/15/23 QTY 90 W/1RF     Name: [place] M [place]  :  1945     Date of last appointment:  2024   Date of next appointment:  3/26/2024   Best number to reach patient:  [phone]

## 2024-02-18 ENCOUNTER — PATIENT MESSAGE (OUTPATIENT)
Dept: PRIMARY CARE | Facility: CLINIC | Age: 79
End: 2024-02-18
Payer: MEDICARE

## 2024-02-18 DIAGNOSIS — F33.1 MODERATE EPISODE OF RECURRENT MAJOR DEPRESSIVE DISORDER (MULTI): ICD-10-CM

## 2024-02-19 RX ORDER — ARIPIPRAZOLE 5 MG/1
2.5 TABLET ORAL DAILY
Qty: 45 TABLET | Refills: 2 | Status: SHIPPED | OUTPATIENT
Start: 2024-02-19 | End: 2024-05-07 | Stop reason: WASHOUT

## 2024-02-19 NOTE — TELEPHONE ENCOUNTER
From: Marlene Alvarez  To: Sheldon Up MD  Sent: 2/18/2024 11:41 PM EST  Subject: Prescription for Abilify    Please write me a new prescription for Abilify so I can send it to [facility] to be filled. I will come over and pick it up. Thanks, Marlene CHEATHAM

## 2024-02-20 DIAGNOSIS — L29.9 PRURITUS OF SKIN: ICD-10-CM

## 2024-02-20 RX ORDER — HYDROXYZINE HYDROCHLORIDE 25 MG/1
TABLET, FILM COATED ORAL
Qty: 180 TABLET | Refills: 0 | OUTPATIENT
Start: 2024-02-20

## 2024-02-20 NOTE — TELEPHONE ENCOUNTER
Rx Refill Request Telephone Encounter    Name:  Bartelso AMEENA Alvarez  :  [id number]  Medication Name:  hydroxyzine 25mg   Specific Pharmacy location:  [facility] IN [place]   Date of last appointment:  24  Date of next appointment:  3/26/24  Best number to reach patient:  [phone]

## 2024-03-11 ENCOUNTER — HOSPITAL ENCOUNTER (OUTPATIENT)
Dept: RADIOLOGY | Facility: CLINIC | Age: 79
Discharge: HOME | End: 2024-03-11
Payer: MEDICARE

## 2024-03-11 ENCOUNTER — OFFICE VISIT (OUTPATIENT)
Dept: ORTHOPEDIC SURGERY | Facility: CLINIC | Age: 79
End: 2024-03-11
Payer: MEDICARE

## 2024-03-11 DIAGNOSIS — M25.562 LEFT KNEE PAIN, UNSPECIFIED CHRONICITY: ICD-10-CM

## 2024-03-11 DIAGNOSIS — M17.12 PRIMARY LOCALIZED OSTEOARTHRITIS OF LEFT KNEE: Primary | ICD-10-CM

## 2024-03-11 PROCEDURE — 99213 OFFICE O/P EST LOW 20 MIN: CPT | Performed by: ORTHOPAEDIC SURGERY

## 2024-03-11 PROCEDURE — 1160F RVW MEDS BY RX/DR IN RCRD: CPT | Performed by: ORTHOPAEDIC SURGERY

## 2024-03-11 PROCEDURE — 1126F AMNT PAIN NOTED NONE PRSNT: CPT | Performed by: ORTHOPAEDIC SURGERY

## 2024-03-11 PROCEDURE — 73560 X-RAY EXAM OF KNEE 1 OR 2: CPT | Mod: LT

## 2024-03-11 PROCEDURE — 1036F TOBACCO NON-USER: CPT | Performed by: ORTHOPAEDIC SURGERY

## 2024-03-11 PROCEDURE — 73560 X-RAY EXAM OF KNEE 1 OR 2: CPT | Mod: LEFT SIDE | Performed by: ORTHOPAEDIC SURGERY

## 2024-03-11 PROCEDURE — 1159F MED LIST DOCD IN RCRD: CPT | Performed by: ORTHOPAEDIC SURGERY

## 2024-03-11 NOTE — PROGRESS NOTES
History of present illness: Patient with severe left knee arthrosis did well with Gelsyn-3 injections for 3 to 6 months she like to proceed with those injections again after April 2, 2024    Physical exam:    General: No acute distress or breathing difficulty or discomfort, pleasant and cooperative with the examination.    Extremities: The affected left knee was examined and inspected and was tender to touch along the medial and lateral aspect with catching, locking or mechanical symptoms.    The skin was intact without breakdown or open wound.  Old incisions of present were healed.    There was a mild Shahla exam seen with some evidence of instability and weakness in the collateral ligaments with varus valgus stressing and laxity in the anterior or posterior planes.    There was a negative Lachman's test pivot shift test and posterior drawer sign with no foot drop, numbness or tingling.    Sensation, reflexes and pulses in the foot and ankle are preserved.  There was an effusion.  Range of motion showed good straight leg raise with flexion to 150 degrees and extension to 0 degrees.  The patient had the ability to bear weight but with discomfort.  The patient's gait was antalgic secondary to discomfort    Diagnostic studies: 2 view x-rays left knee show advanced osteoarthrosis medial and patellofemoral joint left knee    Impression: Left knee advanced arthritis    Plan: The patient will call after April 2, 2024 at that point we will place the order for the 3 shot Gelsyn-3 injection series

## 2024-03-12 ENCOUNTER — LAB (OUTPATIENT)
Dept: LAB | Facility: LAB | Age: 79
End: 2024-03-12
Payer: MEDICARE

## 2024-03-12 DIAGNOSIS — E78.2 MIXED HYPERLIPIDEMIA: ICD-10-CM

## 2024-03-12 DIAGNOSIS — I10 ESSENTIAL HYPERTENSION: ICD-10-CM

## 2024-03-12 DIAGNOSIS — E03.9 ACQUIRED HYPOTHYROIDISM: ICD-10-CM

## 2024-03-12 LAB
ALBUMIN SERPL BCP-MCNC: 4.3 G/DL (ref 3.4–5)
ALP SERPL-CCNC: 61 U/L (ref 33–136)
ALT SERPL W P-5'-P-CCNC: 19 U/L (ref 7–45)
ANION GAP SERPL CALC-SCNC: 13 MMOL/L (ref 10–20)
AST SERPL W P-5'-P-CCNC: 23 U/L (ref 9–39)
BASOPHILS # BLD AUTO: 0.07 X10*3/UL (ref 0–0.1)
BASOPHILS NFR BLD AUTO: 1 %
BILIRUB SERPL-MCNC: 0.4 MG/DL (ref 0–1.2)
BUN SERPL-MCNC: 49 MG/DL (ref 6–23)
CALCIUM SERPL-MCNC: 9.8 MG/DL (ref 8.6–10.3)
CHLORIDE SERPL-SCNC: 106 MMOL/L (ref 98–107)
CHOLEST SERPL-MCNC: 205 MG/DL (ref 0–199)
CHOLESTEROL/HDL RATIO: 1.7
CO2 SERPL-SCNC: 24 MMOL/L (ref 21–32)
CREAT SERPL-MCNC: 1.39 MG/DL (ref 0.5–1.05)
EGFRCR SERPLBLD CKD-EPI 2021: 39 ML/MIN/1.73M*2
EOSINOPHIL # BLD AUTO: 0.17 X10*3/UL (ref 0–0.4)
EOSINOPHIL NFR BLD AUTO: 2.4 %
ERYTHROCYTE [DISTWIDTH] IN BLOOD BY AUTOMATED COUNT: 12.9 % (ref 11.5–14.5)
GLUCOSE SERPL-MCNC: 94 MG/DL (ref 74–99)
HCT VFR BLD AUTO: 35.8 % (ref 36–46)
HDLC SERPL-MCNC: 118.2 MG/DL
HGB BLD-MCNC: 11.5 G/DL (ref 12–16)
IMM GRANULOCYTES # BLD AUTO: 0.02 X10*3/UL (ref 0–0.5)
IMM GRANULOCYTES NFR BLD AUTO: 0.3 % (ref 0–0.9)
LDLC SERPL CALC-MCNC: 73 MG/DL
LYMPHOCYTES # BLD AUTO: 2.39 X10*3/UL (ref 0.8–3)
LYMPHOCYTES NFR BLD AUTO: 34.1 %
MCH RBC QN AUTO: 30.6 PG (ref 26–34)
MCHC RBC AUTO-ENTMCNC: 32.1 G/DL (ref 32–36)
MCV RBC AUTO: 95 FL (ref 80–100)
MONOCYTES # BLD AUTO: 0.51 X10*3/UL (ref 0.05–0.8)
MONOCYTES NFR BLD AUTO: 7.3 %
NEUTROPHILS # BLD AUTO: 3.84 X10*3/UL (ref 1.6–5.5)
NEUTROPHILS NFR BLD AUTO: 54.9 %
NON HDL CHOLESTEROL: 87 MG/DL (ref 0–149)
NRBC BLD-RTO: 0 /100 WBCS (ref 0–0)
PLATELET # BLD AUTO: 348 X10*3/UL (ref 150–450)
POTASSIUM SERPL-SCNC: 5.3 MMOL/L (ref 3.5–5.3)
PROT SERPL-MCNC: 6.9 G/DL (ref 6.4–8.2)
RBC # BLD AUTO: 3.76 X10*6/UL (ref 4–5.2)
SODIUM SERPL-SCNC: 138 MMOL/L (ref 136–145)
T4 FREE SERPL-MCNC: 1.25 NG/DL (ref 0.61–1.12)
TRIGL SERPL-MCNC: 68 MG/DL (ref 0–149)
TSH SERPL-ACNC: 0.33 MIU/L (ref 0.44–3.98)
VLDL: 14 MG/DL (ref 0–40)
WBC # BLD AUTO: 7 X10*3/UL (ref 4.4–11.3)

## 2024-03-12 PROCEDURE — 36415 COLL VENOUS BLD VENIPUNCTURE: CPT

## 2024-03-12 PROCEDURE — 85025 COMPLETE CBC W/AUTO DIFF WBC: CPT

## 2024-03-12 PROCEDURE — 80061 LIPID PANEL: CPT

## 2024-03-12 PROCEDURE — 84439 ASSAY OF FREE THYROXINE: CPT

## 2024-03-12 PROCEDURE — 80053 COMPREHEN METABOLIC PANEL: CPT

## 2024-03-12 PROCEDURE — 84443 ASSAY THYROID STIM HORMONE: CPT

## 2024-03-26 ENCOUNTER — OFFICE VISIT (OUTPATIENT)
Dept: PRIMARY CARE | Facility: CLINIC | Age: 79
End: 2024-03-26
Payer: MEDICARE

## 2024-03-26 VITALS
RESPIRATION RATE: 18 BRPM | OXYGEN SATURATION: 100 % | WEIGHT: 220 LBS | SYSTOLIC BLOOD PRESSURE: 122 MMHG | HEART RATE: 84 BPM | BODY MASS INDEX: 37.56 KG/M2 | TEMPERATURE: 97.9 F | DIASTOLIC BLOOD PRESSURE: 70 MMHG | HEIGHT: 64 IN

## 2024-03-26 DIAGNOSIS — D64.9 CHRONIC ANEMIA: ICD-10-CM

## 2024-03-26 DIAGNOSIS — E66.01 CLASS 2 SEVERE OBESITY DUE TO EXCESS CALORIES WITH SERIOUS COMORBIDITY AND BODY MASS INDEX (BMI) OF 37.0 TO 37.9 IN ADULT (MULTI): ICD-10-CM

## 2024-03-26 DIAGNOSIS — E78.2 MIXED HYPERLIPIDEMIA: ICD-10-CM

## 2024-03-26 DIAGNOSIS — M35.01 KERATOCONJUNCTIVITIS SICCA (MULTI): ICD-10-CM

## 2024-03-26 DIAGNOSIS — I10 ESSENTIAL HYPERTENSION: Primary | ICD-10-CM

## 2024-03-26 DIAGNOSIS — R25.2 LEG CRAMPS: ICD-10-CM

## 2024-03-26 DIAGNOSIS — E03.9 ACQUIRED HYPOTHYROIDISM: ICD-10-CM

## 2024-03-26 DIAGNOSIS — F33.1 MODERATE EPISODE OF RECURRENT MAJOR DEPRESSIVE DISORDER (MULTI): ICD-10-CM

## 2024-03-26 DIAGNOSIS — N18.32 STAGE 3B CHRONIC KIDNEY DISEASE (MULTI): ICD-10-CM

## 2024-03-26 PROCEDURE — 3078F DIAST BP <80 MM HG: CPT | Performed by: FAMILY MEDICINE

## 2024-03-26 PROCEDURE — 1160F RVW MEDS BY RX/DR IN RCRD: CPT | Performed by: FAMILY MEDICINE

## 2024-03-26 PROCEDURE — 99214 OFFICE O/P EST MOD 30 MIN: CPT | Performed by: FAMILY MEDICINE

## 2024-03-26 PROCEDURE — 1036F TOBACCO NON-USER: CPT | Performed by: FAMILY MEDICINE

## 2024-03-26 PROCEDURE — 3074F SYST BP LT 130 MM HG: CPT | Performed by: FAMILY MEDICINE

## 2024-03-26 PROCEDURE — 1158F ADVNC CARE PLAN TLK DOCD: CPT | Performed by: FAMILY MEDICINE

## 2024-03-26 PROCEDURE — 1159F MED LIST DOCD IN RCRD: CPT | Performed by: FAMILY MEDICINE

## 2024-03-26 PROCEDURE — 1123F ACP DISCUSS/DSCN MKR DOCD: CPT | Performed by: FAMILY MEDICINE

## 2024-03-26 ASSESSMENT — ENCOUNTER SYMPTOMS
RESTLESSNESS: 0
TREMORS: 1
SORE THROAT: 0
CONSTIPATION: 0
NIGHTTIME AWAKENINGS: SEVERAL
COUGH: 0
CHILLS: 0
INSOMNIA: 1
FEVER: 0
PALPITATIONS: 0
RHINORRHEA: 0
WHEEZING: 0
HOPELESSNESS: 0
VOMITING: 0
SLEEP QUALITY: FAIR
THOUGHTS THAT DEATH WOULD BE EASIER: 0
HEADACHES: 0
DEPRESSED MOOD: 0
NUMBNESS: 0
DYSURIA: 0
FREQUENCY: 0
SHORTNESS OF BREATH: 0
DIZZINESS: 0
HEMATURIA: 0
WEIGHT LOSS: 0
FEELINGS OF WORTHLESSNESS: 0
DIARRHEA: 0
DEPRESSION: 1
ABDOMINAL PAIN: 0

## 2024-03-26 ASSESSMENT — PATIENT HEALTH QUESTIONNAIRE - PHQ9
1. LITTLE INTEREST OR PLEASURE IN DOING THINGS: NOT AT ALL
2. FEELING DOWN, DEPRESSED OR HOPELESS: NOT AT ALL
SUM OF ALL RESPONSES TO PHQ9 QUESTIONS 1 AND 2: 0

## 2024-03-26 NOTE — PATIENT INSTRUCTIONS
BMI was above normal measurement. Current weight: 99.8 kg (220 lb)  Weight change since last visit (-) denotes wt loss 3.73 lbs   Weight loss needed to achieve BMI 25: 74.7 Lbs  Weight loss needed to achieve BMI 30: 45.6 Lbs  Advised to Increase physical activity.

## 2024-03-26 NOTE — ASSESSMENT & PLAN NOTE
We will wean her off the Abilify by having her take 0.5 tablet every other day for 1 month then twice a week for a week then stop as long as she is doing well.

## 2024-03-26 NOTE — PROGRESS NOTES
"Subjective   Patient ID: Marlene Alvarez is a 78 y.o. female who presents for Follow-up (Hypertension, Hyperlipidemia, Hypothyroidism and labs).    Patient is here for follow-up on hypertension and hyperlipidemia. She has no chest pain, dyspnea, exertional chest pressure/discomfort, near-syncope, orthopnea, palpitations, paroxysmal nocturnal dyspnea, and syncope. Taking her medication regularly with no side effects.    She presents for follow up of hypothyroidism. Current symptoms: none. She has no diarrhea, heat / cold intolerance, nervousness, palpitations, or weight changes.     She complains of cramping in her legs.    Depression  Visit Type: follow-up  Patient presents with the following symptoms: insomnia.  Patient is not experiencing: depressed mood, excessive worry, feelings of hopelessness, feelings of worthlessness, palpitations, restlessness, shortness of breath, thoughts of death and weight loss.  Frequency of symptoms: rarely    Sleep quality: fair  Nighttime awakenings: several  Compliance with medications:  %         Review of Systems   Constitutional:  Negative for chills, fever and weight loss.   HENT:  Negative for congestion, ear pain, nosebleeds, rhinorrhea and sore throat.    Respiratory:  Negative for cough, shortness of breath and wheezing.    Cardiovascular:  Negative for chest pain, palpitations and leg swelling.   Gastrointestinal:  Negative for abdominal pain, constipation, diarrhea and vomiting.   Genitourinary:  Negative for dysuria, frequency and hematuria.   Neurological:  Positive for tremors. Negative for dizziness, numbness and headaches.   Psychiatric/Behavioral:  Positive for depression. The patient has insomnia.      Objective   /70   Pulse 84   Temp 36.6 °C (97.9 °F)   Resp 18   Ht 1.626 m (5' 4\")   Wt 99.8 kg (220 lb)   SpO2 100%   BMI 37.76 kg/m²     Physical Exam  Constitutional:       General: She is not in acute distress.     Appearance: Normal " appearance.   HENT:      Head: Normocephalic and atraumatic.      Mouth/Throat:      Mouth: Mucous membranes are moist.      Pharynx: Oropharynx is clear. No oropharyngeal exudate or posterior oropharyngeal erythema.   Eyes:      General: No scleral icterus.     Extraocular Movements: Extraocular movements intact.      Pupils: Pupils are equal, round, and reactive to light.   Cardiovascular:      Rate and Rhythm: Normal rate and regular rhythm.      Pulses: Normal pulses.      Heart sounds: No murmur heard.     No friction rub. No gallop.   Pulmonary:      Effort: Pulmonary effort is normal.      Breath sounds: No wheezing, rhonchi or rales.   Skin:     General: Skin is warm.      Coloration: Skin is not jaundiced or pale.      Findings: No erythema or rash.   Neurological:      General: No focal deficit present.      Mental Status: She is alert and oriented to person, place, and time.      Cranial Nerves: No cranial nerve deficit.      Sensory: No sensory deficit.      Coordination: Coordination normal.      Gait: Gait normal.         Lab on 03/12/2024   Component Date Value Ref Range Status    Glucose 03/12/2024 94  74 - 99 mg/dL Final    Sodium 03/12/2024 138  136 - 145 mmol/L Final    Potassium 03/12/2024 5.3  3.5 - 5.3 mmol/L Final    Chloride 03/12/2024 106  98 - 107 mmol/L Final    Bicarbonate 03/12/2024 24  21 - 32 mmol/L Final    Anion Gap 03/12/2024 13  10 - 20 mmol/L Final    Urea Nitrogen 03/12/2024 49 (H)  6 - 23 mg/dL Final    Creatinine 03/12/2024 1.39 (H)  0.50 - 1.05 mg/dL Final    eGFR 03/12/2024 39 (L)  >60 mL/min/1.73m*2 Final    Calculations of estimated GFR are performed using the 2021 CKD-EPI Study Refit equation without the race variable for the IDMS-Traceable creatinine methods.  https://jasn.asnjournals.org/content/early/2021/09/22/ASN.4160800910    Calcium 03/12/2024 9.8  8.6 - 10.3 mg/dL Final    Albumin 03/12/2024 4.3  3.4 - 5.0 g/dL Final    Alkaline Phosphatase 03/12/2024 61  33 - 136  U/L Final    Total Protein 03/12/2024 6.9  6.4 - 8.2 g/dL Final    AST 03/12/2024 23  9 - 39 U/L Final    Bilirubin, Total 03/12/2024 0.4  0.0 - 1.2 mg/dL Final    ALT 03/12/2024 19  7 - 45 U/L Final    Patients treated with Sulfasalazine may generate falsely decreased results for ALT.    WBC 03/12/2024 7.0  4.4 - 11.3 x10*3/uL Final    nRBC 03/12/2024 0.0  0.0 - 0.0 /100 WBCs Final    RBC 03/12/2024 3.76 (L)  4.00 - 5.20 x10*6/uL Final    Hemoglobin 03/12/2024 11.5 (L)  12.0 - 16.0 g/dL Final    Hematocrit 03/12/2024 35.8 (L)  36.0 - 46.0 % Final    MCV 03/12/2024 95  80 - 100 fL Final    MCH 03/12/2024 30.6  26.0 - 34.0 pg Final    MCHC 03/12/2024 32.1  32.0 - 36.0 g/dL Final    RDW 03/12/2024 12.9  11.5 - 14.5 % Final    Platelets 03/12/2024 348  150 - 450 x10*3/uL Final    Neutrophils % 03/12/2024 54.9  40.0 - 80.0 % Final    Immature Granulocytes %, Automated 03/12/2024 0.3  0.0 - 0.9 % Final    Immature Granulocyte Count (IG) includes promyelocytes, myelocytes and metamyelocytes but does not include bands. Percent differential counts (%) should be interpreted in the context of the absolute cell counts (cells/UL).    Lymphocytes % 03/12/2024 34.1  13.0 - 44.0 % Final    Monocytes % 03/12/2024 7.3  2.0 - 10.0 % Final    Eosinophils % 03/12/2024 2.4  0.0 - 6.0 % Final    Basophils % 03/12/2024 1.0  0.0 - 2.0 % Final    Neutrophils Absolute 03/12/2024 3.84  1.60 - 5.50 x10*3/uL Final    Percent differential counts (%) should be interpreted in the context of the absolute cell counts (cells/uL).    Immature Granulocytes Absolute, Au* 03/12/2024 0.02  0.00 - 0.50 x10*3/uL Final    Lymphocytes Absolute 03/12/2024 2.39  0.80 - 3.00 x10*3/uL Final    Monocytes Absolute 03/12/2024 0.51  0.05 - 0.80 x10*3/uL Final    Eosinophils Absolute 03/12/2024 0.17  0.00 - 0.40 x10*3/uL Final    Basophils Absolute 03/12/2024 0.07  0.00 - 0.10 x10*3/uL Final    Cholesterol 03/12/2024 205 (H)  0 - 199 mg/dL Final          Age       Desirable   Borderline High   High     0-19 Y     0 - 169       170 - 199     >/= 200    20-24 Y     0 - 189       190 - 224     >/= 225         >24 Y     0 - 199       200 - 239     >/= 240   **All ranges are based on fasting samples. Specific   therapeutic targets will vary based on patient-specific   cardiac risk.    Pediatric guidelines reference:Pediatrics 2011, 128(S5).Adult guidelines reference: NCEP ATPIII Guidelines,CEM 2001, 258:0166-97    Venipuncture immediately after or during the administration of Metamizole may lead to falsely low results. Testing should be performed immediately prior to Metamizole dosing.    HDL-Cholesterol 03/12/2024 118.2  mg/dL Final      Age       Very Low   Low     Normal    High    0-19 Y    < 35      < 40     40-45     ----  20-24 Y    ----     < 40      >45      ----        >24 Y      ----     < 40     40-60      >60      Cholesterol/HDL Ratio 03/12/2024 1.7   Final      Ref Values  Desirable  < 3.4  High Risk  > 5.0    LDL Calculated 03/12/2024 73  <=99 mg/dL Final                                Near   Borderline      AGE      Desirable  Optimal    High     High     Very High     0-19 Y     0 - 109     ---    110-129   >/= 130     ----    20-24 Y     0 - 119     ---    120-159   >/= 160     ----      >24 Y     0 -  99   100-129  130-159   160-189     >/=190      VLDL 03/12/2024 14  0 - 40 mg/dL Final    Triglycerides 03/12/2024 68  0 - 149 mg/dL Final       Age         Desirable   Borderline High   High     Very High   0 D-90 D    19 - 174         ----         ----        ----  91 D- 9 Y     0 -  74        75 -  99     >/= 100      ----    10-19 Y     0 -  89        90 - 129     >/= 130      ----    20-24 Y     0 - 114       115 - 149     >/= 150      ----         >24 Y     0 - 149       150 - 199    200- 499    >/= 500    Venipuncture immediately after or during the administration of Metamizole may lead to falsely low results. Testing should be performed immediately prior  "to Metamizole dosing.    Non HDL Cholesterol 03/12/2024 87  0 - 149 mg/dL Final          Age       Desirable   Borderline High   High     Very High     0-19 Y     0 - 119       120 - 144     >/= 145    >/= 160    20-24 Y     0 - 149       150 - 189     >/= 190      ----         >24 Y    30 mg/dL above LDL Cholesterol goal      Thyroid Stimulating Hormone 03/12/2024 0.33 (L)  0.44 - 3.98 mIU/L Final    Thyroxine, Free 03/12/2024 1.25 (H)  0.61 - 1.12 ng/dL Final     Assessment/Plan   Problem List Items Addressed This Visit       Acquired hypothyroidism     Well-controlled, continue current medications and management.         Relevant Orders    Thyroid Stimulating Hormone    Thyroxine, Free    Follow Up In [facility] - PCP - Established    Chronic anemia     Stable, continue current medications and management.         Relevant Orders    CBC and Auto Differential    Class 2 severe obesity due to excess calories with serious comorbidity and body mass index (BMI) of 37.0 to 37.9 in adult (CMS/[facility])     Continue decrease calorie diet and not more than 1500 calorie per day diet and low-fat diet.  Continue with regular exercise program.  We advised exercise at least 5 days a week for at least 45 minutes and also a minimum of 10,000 steps a day.  The detrimental effects of obesity on health were discussed.         Essential hypertension - Primary     Well-controlled, continue current medications and management.  Dietary sodium restriction.  Regular aerobic exercise program is recommended to help achieve and maintain normal body weight, fitness and improve lipid balance. .  Dietary changes: Increase soluble fiber  Plant sterols 2grams per day (e.g. Benecol)  Reduce saturated fat, \"trans\" monounsaturated fatty acids, and cholesterol         Relevant Orders    CBC and Auto Differential    Comprehensive Metabolic Panel    Lipid Panel    Follow Up In [facility] - PCP - Established    Keratoconjunctivitis " sicca (CMS/HCC)     Chronic Condition Documentation : Stable based on symptoms and exam.  Continue established treatment plan and follow-up at least yearly.         Leg cramps     We will have her start taking OTC Magnesium Oxide 400 mg daily.         Mixed hyperlipidemia     The nature of cardiac risk has been fully discussed with this patient. Discussed cardiovascular risk analysis and appropriate diet with the need for lifelong measures to reduce the risk. A regular exercise program is recommended to help achieve and maintain normal body weight, fitness and improve lipid balance. Patient education provided. They understand and agree with this course of treatment. They will return with new or worsening symptoms. Patient instructed to remain current with appropriate annual health maintenance.          Relevant Orders    CBC and Auto Differential    Comprehensive Metabolic Panel    Lipid Panel    Follow Up In [facility] - PCP - Established    Moderate episode of recurrent major depressive disorder (CMS/HCC)     We will wean her off the Abilify by having her take 0.5 tablet every other day for 1 month then twice a week for a week then stop as long as she is doing well.         Relevant Orders    Follow Up In [facility] - PCP - Established    Stage 3b chronic kidney disease (CMS/HCC)     Stable, continue current medications and management.          Scribe Attestation  By signing my name below, IRalph Scribe   attest that this documentation has been prepared under the direction and in the presence of Sheldon Up MD.

## 2024-04-15 ENCOUNTER — OFFICE VISIT (OUTPATIENT)
Dept: ORTHOPEDIC SURGERY | Facility: CLINIC | Age: 79
End: 2024-04-15
Payer: MEDICARE

## 2024-04-15 DIAGNOSIS — M17.12 PRIMARY LOCALIZED OSTEOARTHRITIS OF LEFT KNEE: Primary | ICD-10-CM

## 2024-04-15 PROCEDURE — 20610 DRAIN/INJ JOINT/BURSA W/O US: CPT | Mod: LT | Performed by: ORTHOPAEDIC SURGERY

## 2024-04-15 PROCEDURE — 1160F RVW MEDS BY RX/DR IN RCRD: CPT | Performed by: ORTHOPAEDIC SURGERY

## 2024-04-15 PROCEDURE — 2500000004 HC RX 250 GENERAL PHARMACY W/ HCPCS (ALT 636 FOR OP/ED): Mod: JZ | Performed by: ORTHOPAEDIC SURGERY

## 2024-04-15 PROCEDURE — 1159F MED LIST DOCD IN RCRD: CPT | Performed by: ORTHOPAEDIC SURGERY

## 2024-04-15 PROCEDURE — 1123F ACP DISCUSS/DSCN MKR DOCD: CPT | Performed by: ORTHOPAEDIC SURGERY

## 2024-04-15 RX ADMIN — Medication 2 ML: at 09:03

## 2024-04-15 NOTE — PROGRESS NOTES
Before the left injection the benefits of a hyaluronic acid  injection including infection, local skin irritation, skin atrophy, calcification, continued pain and discomfort, elevated blood sugar, burning, failure to relieve pain, possible late infection were discussed with the patient.    Postprocedure discomfort can be alleviated with additional medications, ice, elevation, rest over the first 24 hours as recommended.    Patient verbalized understanding and wanted to proceed with the planned procedure.    After informed consent was provided and allergies verified, the patient was positioned appropriately on the bed.  The LEFT KNEE to be injected was prepped and draped in a sterile fashion.  The skin was anesthetized with ethyl chloride spray.   A  22-gauge needle was used to inject the appropriate joint.    Joint injection was performed with Gelsyn 3 contains 16.8 mg of hyaluronic acid per 2 mL was performed.  The needle was removed and the puncture site closed and sealed with a Band-Aid.  The patient tolerated the procedure well.  L Inj/Asp: L knee on 4/15/2024 9:03 AM  Indications: pain  Details: 22 G needle, medial approach  Medications: 2 mL sodium hyaluronate 16.8 mg/2 mL  Outcome: tolerated well, no immediate complications  Procedure, treatment alternatives, risks and benefits explained, specific risks discussed. Consent was given by the patient. Immediately prior to procedure a time out was called to verify the correct patient, procedure, equipment, support staff and site/side marked as required. Patient was prepped and draped in the usual sterile fashion.

## 2024-04-22 ENCOUNTER — OFFICE VISIT (OUTPATIENT)
Dept: ORTHOPEDIC SURGERY | Facility: CLINIC | Age: 79
End: 2024-04-22
Payer: MEDICARE

## 2024-04-22 DIAGNOSIS — M17.12 ARTHRITIS OF KNEE, LEFT: Primary | ICD-10-CM

## 2024-04-22 PROCEDURE — 1123F ACP DISCUSS/DSCN MKR DOCD: CPT | Performed by: ORTHOPAEDIC SURGERY

## 2024-04-22 PROCEDURE — 20610 DRAIN/INJ JOINT/BURSA W/O US: CPT | Mod: LT | Performed by: ORTHOPAEDIC SURGERY

## 2024-04-22 PROCEDURE — 1160F RVW MEDS BY RX/DR IN RCRD: CPT | Performed by: ORTHOPAEDIC SURGERY

## 2024-04-22 PROCEDURE — 2500000004 HC RX 250 GENERAL PHARMACY W/ HCPCS (ALT 636 FOR OP/ED): Mod: JZ | Performed by: ORTHOPAEDIC SURGERY

## 2024-04-22 PROCEDURE — 1159F MED LIST DOCD IN RCRD: CPT | Performed by: ORTHOPAEDIC SURGERY

## 2024-04-22 PROCEDURE — 1036F TOBACCO NON-USER: CPT | Performed by: ORTHOPAEDIC SURGERY

## 2024-04-22 RX ADMIN — Medication 2 ML: at 08:27

## 2024-04-22 NOTE — PROGRESS NOTES
Before the left injection the benefits of a hyaluronic acid  injection including infection, local skin irritation, skin atrophy, calcification, continued pain and discomfort, elevated blood sugar, burning, failure to relieve pain, possible late infection were discussed with the patient.    Postprocedure discomfort can be alleviated with additional medications, ice, elevation, rest over the first 24 hours as recommended.    Patient verbalized understanding and wanted to proceed with the planned procedure.    After informed consent was provided and allergies verified, the patient was positioned appropriately on the bed.  The LEFT KNEE to be injected was prepped and draped in a sterile fashion.  The skin was anesthetized with ethyl chloride spray.   A  22-gauge needle was used to inject the appropriate joint.    Joint injection was performed with Gelsyn 3 contains 16.8 mg of hyaluronic acid per 2 mL was performed.  The needle was removed and the puncture site closed and sealed with a Band-Aid.  The patient tolerated the procedure well.        L Inj/Asp: L knee on 4/22/2024 8:27 AM  Indications: pain  Details: 22 G needle, anteromedial approach  Medications: 2 mL sodium hyaluronate 16.8 mg/2 mL  Outcome: tolerated well, no immediate complications  Procedure, treatment alternatives, risks and benefits explained, specific risks discussed. Consent was given by the patient. Immediately prior to procedure a time out was called to verify the correct patient, procedure, equipment, support staff and site/side marked as required. Patient was prepped and draped in the usual sterile fashion.

## 2024-04-23 DIAGNOSIS — F33.1 MAJOR DEPRESSIVE DISORDER, RECURRENT, MODERATE (MULTI): ICD-10-CM

## 2024-04-23 RX ORDER — PAROXETINE HYDROCHLORIDE 40 MG/1
40 TABLET, FILM COATED ORAL DAILY
Qty: 90 TABLET | Refills: 0 | Status: SHIPPED | OUTPATIENT
Start: 2024-04-23

## 2024-04-23 NOTE — TELEPHONE ENCOUNTER
Rx Refill Request Telephone Encounter    Name:  Marlene LINDQUIST Antonio  :  [id number]  Medication Name:  Paxil 40MG   Specific Pharmacy location:  [facility]  in [place]   Date of last appointment:  3/26/24  Date of next appointment:  7/15/24  Best number to reach patient:  [phone]

## 2024-04-29 ENCOUNTER — OFFICE VISIT (OUTPATIENT)
Dept: ORTHOPEDIC SURGERY | Facility: CLINIC | Age: 79
End: 2024-04-29
Payer: MEDICARE

## 2024-04-29 DIAGNOSIS — M17.12 PRIMARY LOCALIZED OSTEOARTHRITIS OF LEFT KNEE: Primary | ICD-10-CM

## 2024-04-29 PROCEDURE — 1123F ACP DISCUSS/DSCN MKR DOCD: CPT | Performed by: ORTHOPAEDIC SURGERY

## 2024-04-29 PROCEDURE — 1160F RVW MEDS BY RX/DR IN RCRD: CPT | Performed by: ORTHOPAEDIC SURGERY

## 2024-04-29 PROCEDURE — 20610 DRAIN/INJ JOINT/BURSA W/O US: CPT | Mod: LT | Performed by: ORTHOPAEDIC SURGERY

## 2024-04-29 PROCEDURE — 2500000004 HC RX 250 GENERAL PHARMACY W/ HCPCS (ALT 636 FOR OP/ED): Mod: JZ | Performed by: ORTHOPAEDIC SURGERY

## 2024-04-29 PROCEDURE — 1159F MED LIST DOCD IN RCRD: CPT | Performed by: ORTHOPAEDIC SURGERY

## 2024-04-29 RX ADMIN — Medication 2 ML: at 08:28

## 2024-04-29 NOTE — PROGRESS NOTES
Before the left injection the benefits of a hyaluronic acid  injection including infection, local skin irritation, skin atrophy, calcification, continued pain and discomfort, elevated blood sugar, burning, failure to relieve pain, possible late infection were discussed with the patient.    Postprocedure discomfort can be alleviated with additional medications, ice, elevation, rest over the first 24 hours as recommended.    Patient verbalized understanding and wanted to proceed with the planned procedure.    After informed consent was provided and allergies verified, the patient was positioned appropriately on the bed.  The LEFT KNEE to be injected was prepped and draped in a sterile fashion.  The skin was anesthetized with ethyl chloride spray.   A  22-gauge needle was used to inject the appropriate joint.    Joint injection was performed with Gelsyn 3 contains 16.8 mg of hyaluronic acid per 2 mL was performed.  The needle was removed and the puncture site closed and sealed with a Band-Aid.  The patient tolerated the procedure well.        L Inj/Asp: L knee on 4/29/2024 8:28 AM  Indications: pain  Details: 22 G needle, medial approach  Medications: 2 mL sodium hyaluronate 16.8 mg/2 mL  Outcome: tolerated well, no immediate complications  Procedure, treatment alternatives, risks and benefits explained, specific risks discussed. Consent was given by the patient. Immediately prior to procedure a time out was called to verify the correct patient, procedure, equipment, support staff and site/side marked as required. Patient was prepped and draped in the usual sterile fashion.

## 2024-05-07 ENCOUNTER — OFFICE VISIT (OUTPATIENT)
Dept: PRIMARY CARE | Facility: CLINIC | Age: 79
End: 2024-05-07
Payer: MEDICARE

## 2024-05-07 ENCOUNTER — HOSPITAL ENCOUNTER (OUTPATIENT)
Dept: RADIOLOGY | Facility: HOSPITAL | Age: 79
Discharge: HOME | End: 2024-05-07
Payer: MEDICARE

## 2024-05-07 VITALS
BODY MASS INDEX: 36.19 KG/M2 | HEIGHT: 64 IN | TEMPERATURE: 96.3 F | WEIGHT: 212 LBS | SYSTOLIC BLOOD PRESSURE: 120 MMHG | RESPIRATION RATE: 17 BRPM | DIASTOLIC BLOOD PRESSURE: 60 MMHG | HEART RATE: 77 BPM | OXYGEN SATURATION: 98 %

## 2024-05-07 DIAGNOSIS — E66.01 CLASS 2 SEVERE OBESITY DUE TO EXCESS CALORIES WITH SERIOUS COMORBIDITY AND BODY MASS INDEX (BMI) OF 36.0 TO 36.9 IN ADULT (MULTI): ICD-10-CM

## 2024-05-07 DIAGNOSIS — J01.00 ACUTE NON-RECURRENT MAXILLARY SINUSITIS: ICD-10-CM

## 2024-05-07 DIAGNOSIS — J20.9 ACUTE BRONCHITIS, UNSPECIFIED ORGANISM: Primary | ICD-10-CM

## 2024-05-07 DIAGNOSIS — J20.9 ACUTE BRONCHITIS, UNSPECIFIED ORGANISM: ICD-10-CM

## 2024-05-07 DIAGNOSIS — J30.9 CHRONIC ALLERGIC RHINITIS: ICD-10-CM

## 2024-05-07 PROCEDURE — 99213 OFFICE O/P EST LOW 20 MIN: CPT | Performed by: FAMILY MEDICINE

## 2024-05-07 PROCEDURE — 1159F MED LIST DOCD IN RCRD: CPT | Performed by: FAMILY MEDICINE

## 2024-05-07 PROCEDURE — 71046 X-RAY EXAM CHEST 2 VIEWS: CPT | Performed by: STUDENT IN AN ORGANIZED HEALTH CARE EDUCATION/TRAINING PROGRAM

## 2024-05-07 PROCEDURE — 3074F SYST BP LT 130 MM HG: CPT | Performed by: FAMILY MEDICINE

## 2024-05-07 PROCEDURE — 1123F ACP DISCUSS/DSCN MKR DOCD: CPT | Performed by: FAMILY MEDICINE

## 2024-05-07 PROCEDURE — 71046 X-RAY EXAM CHEST 2 VIEWS: CPT

## 2024-05-07 PROCEDURE — 1160F RVW MEDS BY RX/DR IN RCRD: CPT | Performed by: FAMILY MEDICINE

## 2024-05-07 PROCEDURE — 3078F DIAST BP <80 MM HG: CPT | Performed by: FAMILY MEDICINE

## 2024-05-07 PROCEDURE — 1036F TOBACCO NON-USER: CPT | Performed by: FAMILY MEDICINE

## 2024-05-07 RX ORDER — AMOXICILLIN AND CLAVULANATE POTASSIUM 875; 125 MG/1; MG/1
875 TABLET, FILM COATED ORAL 2 TIMES DAILY
COMMUNITY
End: 2024-05-07 | Stop reason: ALTCHOICE

## 2024-05-07 RX ORDER — AMOXICILLIN AND CLAVULANATE POTASSIUM 875; 125 MG/1; MG/1
875 TABLET, FILM COATED ORAL 2 TIMES DAILY
COMMUNITY
End: 2024-05-10

## 2024-05-07 RX ORDER — AZITHROMYCIN 250 MG/1
TABLET, FILM COATED ORAL
Qty: 6 TABLET | Refills: 0 | Status: SHIPPED | OUTPATIENT
Start: 2024-05-07

## 2024-05-07 RX ORDER — FLUTICASONE PROPIONATE 50 MCG
2 SPRAY, SUSPENSION (ML) NASAL DAILY
COMMUNITY

## 2024-05-07 RX ORDER — BENZONATATE 200 MG/1
200 CAPSULE ORAL 3 TIMES DAILY PRN
Qty: 30 CAPSULE | Refills: 0 | Status: SHIPPED | OUTPATIENT
Start: 2024-05-07 | End: 2024-05-17

## 2024-05-07 ASSESSMENT — ENCOUNTER SYMPTOMS
CHILLS: 0
SORE THROAT: 0
FEVER: 0
PALPITATIONS: 0
SINUS PAIN: 1
DYSURIA: 0
WHEEZING: 1
VOMITING: 0
COUGH: 1
CONSTIPATION: 0
DIZZINESS: 0
TREMORS: 0
NUMBNESS: 0
RHINORRHEA: 1
DIARRHEA: 0
NAUSEA: 0
HEADACHES: 0
ABDOMINAL PAIN: 0
SHORTNESS OF BREATH: 0
HEMATURIA: 0
FREQUENCY: 0

## 2024-05-07 NOTE — ASSESSMENT & PLAN NOTE
We will start her on Azithromycin and have her continue taking the Augmentin. We will also order Chest X-Ray for further evaluation.  Recommend liberal oral fluid intake.  Call if symptoms fail to improve as expected.    Follow-up if persistent or worsening symptoms otherwise when necessary.

## 2024-05-07 NOTE — PATIENT INSTRUCTIONS
BMI was above normal measurement. Current weight: 96.2 kg (212 lb)  Weight change since last visit (-) denotes wt loss -8 lbs   Weight loss needed to achieve BMI 25: 66.7 Lbs  Weight loss needed to achieve BMI 30: 37.6 Lbs  Advised to Increase physical activity.

## 2024-05-07 NOTE — PROGRESS NOTES
"Subjective   Patient ID: Marlene Alvarez is a 78 y.o. female who presents for URI and Cough.    URI   This is a recurrent problem. Episode onset: 3 weeks ago. The problem has been unchanged. There has been no fever. Associated symptoms include congestion, coughing, rhinorrhea, sinus pain and wheezing. Pertinent negatives include no abdominal pain, chest pain, diarrhea, dysuria, ear pain, headaches, nausea, sneezing, sore throat or vomiting. Treatments tried: Augmentin and Flonase prescribed 5/2/2024 at an Urgent Care. The treatment provided no relief.      Review of Systems   Constitutional:  Negative for chills and fever.   HENT:  Positive for congestion, rhinorrhea and sinus pain. Negative for ear pain, nosebleeds, sneezing and sore throat.    Respiratory:  Positive for cough and wheezing. Negative for shortness of breath.    Cardiovascular:  Negative for chest pain, palpitations and leg swelling.   Gastrointestinal:  Negative for abdominal pain, constipation, diarrhea, nausea and vomiting.   Genitourinary:  Negative for dysuria, frequency and hematuria.   Neurological:  Negative for dizziness, tremors, numbness and headaches.     Objective   /60 (BP Location: Left arm)   Pulse 77   Temp 35.7 °C (96.3 °F)   Resp 17   Ht 1.626 m (5' 4\")   Wt 96.2 kg (212 lb)   SpO2 98%   BMI 36.39 kg/m²     Physical Exam  Constitutional:       General: She is not in acute distress.     Appearance: Normal appearance.   HENT:      Head: Normocephalic and atraumatic.      Mouth/Throat:      Mouth: Mucous membranes are moist.      Pharynx: Oropharynx is clear. No oropharyngeal exudate or posterior oropharyngeal erythema.   Eyes:      General: No scleral icterus.     Extraocular Movements: Extraocular movements intact.      Pupils: Pupils are equal, round, and reactive to light.   Cardiovascular:      Rate and Rhythm: Normal rate and regular rhythm.      Pulses: Normal pulses.      Heart sounds: No murmur heard.     No " friction rub. No gallop.   Pulmonary:      Effort: Pulmonary effort is normal.      Breath sounds: Examination of the right-lower field reveals rhonchi. Examination of the left-lower field reveals rhonchi. Rhonchi present. No wheezing or rales.   Skin:     General: Skin is warm.      Coloration: Skin is not jaundiced or pale.      Findings: No erythema or rash.   Neurological:      General: No focal deficit present.      Mental Status: She is alert and oriented to person, place, and time.      Cranial Nerves: No cranial nerve deficit.      Sensory: No sensory deficit.      Coordination: Coordination normal.      Gait: Gait normal.         Assessment/Plan   Problem List Items Addressed This Visit       Class 2 severe obesity due to excess calories with serious comorbidity and body mass index (BMI) of 36.0 to 36.9 in adult (Multi)     Continue decrease calorie diet and not more than 1500 calorie per day diet and low-fat diet.  Continue with regular exercise program.  We advised exercise at least 5 days a week for at least 45 minutes and also a minimum of 10,000 steps a day.  The detrimental effects of obesity on health were discussed.         Acute bronchitis, unspecified - Primary     We will start her on Azithromycin and have her continue taking the Augmentin. We will also order Chest X-Ray for further evaluation.  Recommend liberal oral fluid intake.  Call if symptoms fail to improve as expected.    Follow-up if persistent or worsening symptoms otherwise when necessary.         Relevant Medications    benzonatate (Tessalon) 200 mg capsule    azithromycin (Zithromax) 250 mg tablet    Other Relevant Orders    XR chest 2 views    Chronic allergic rhinitis     We will have her start taking OTC Claritin 10 mg daily and continue using the Flonase Nasal Spray.          Other Visit Diagnoses       Acute non-recurrent maxillary sinusitis              Scribe Attestation  By signing my name below, Ralph HOWELL Scribe    attest that this documentation has been prepared under the direction and in the presence of Sheldon Up MD.

## 2024-05-07 NOTE — ASSESSMENT & PLAN NOTE
We will have her start taking OTC Claritin 10 mg daily and continue using the Flonase Nasal Spray.

## 2024-05-27 DIAGNOSIS — K21.9 GASTRO-ESOPHAGEAL REFLUX DISEASE WITHOUT ESOPHAGITIS: ICD-10-CM

## 2024-05-27 RX ORDER — FAMOTIDINE 20 MG/1
20 TABLET, FILM COATED ORAL 2 TIMES DAILY
Qty: 180 TABLET | Refills: 1 | Status: SHIPPED | OUTPATIENT
Start: 2024-05-27

## 2024-06-13 DIAGNOSIS — I10 ESSENTIAL (PRIMARY) HYPERTENSION: ICD-10-CM

## 2024-06-13 RX ORDER — LISINOPRIL AND HYDROCHLOROTHIAZIDE 20; 25 MG/1; MG/1
TABLET ORAL
Qty: 90 TABLET | Refills: 2 | Status: SHIPPED | OUTPATIENT
Start: 2024-06-13

## 2024-06-25 ENCOUNTER — LAB (OUTPATIENT)
Dept: LAB | Facility: LAB | Age: 79
End: 2024-06-25
Payer: MEDICARE

## 2024-06-25 DIAGNOSIS — D64.9 CHRONIC ANEMIA: ICD-10-CM

## 2024-06-25 DIAGNOSIS — I10 ESSENTIAL HYPERTENSION: ICD-10-CM

## 2024-06-25 DIAGNOSIS — E03.9 ACQUIRED HYPOTHYROIDISM: ICD-10-CM

## 2024-06-25 DIAGNOSIS — E78.2 MIXED HYPERLIPIDEMIA: ICD-10-CM

## 2024-06-25 LAB
ALBUMIN SERPL BCP-MCNC: 4.1 G/DL (ref 3.4–5)
ALP SERPL-CCNC: 56 U/L (ref 33–136)
ALT SERPL W P-5'-P-CCNC: 20 U/L (ref 7–45)
ANION GAP SERPL CALC-SCNC: 13 MMOL/L (ref 10–20)
AST SERPL W P-5'-P-CCNC: 24 U/L (ref 9–39)
BASOPHILS # BLD AUTO: 0.06 X10*3/UL (ref 0–0.1)
BASOPHILS NFR BLD AUTO: 1.2 %
BILIRUB SERPL-MCNC: 0.4 MG/DL (ref 0–1.2)
BUN SERPL-MCNC: 45 MG/DL (ref 6–23)
CALCIUM SERPL-MCNC: 9.8 MG/DL (ref 8.6–10.3)
CHLORIDE SERPL-SCNC: 109 MMOL/L (ref 98–107)
CHOLEST SERPL-MCNC: 167 MG/DL (ref 0–199)
CHOLESTEROL/HDL RATIO: 1.8
CO2 SERPL-SCNC: 21 MMOL/L (ref 21–32)
CREAT SERPL-MCNC: 1.26 MG/DL (ref 0.5–1.05)
EGFRCR SERPLBLD CKD-EPI 2021: 44 ML/MIN/1.73M*2
EOSINOPHIL # BLD AUTO: 0.21 X10*3/UL (ref 0–0.4)
EOSINOPHIL NFR BLD AUTO: 4 %
ERYTHROCYTE [DISTWIDTH] IN BLOOD BY AUTOMATED COUNT: 14.2 % (ref 11.5–14.5)
GLUCOSE SERPL-MCNC: 85 MG/DL (ref 74–99)
HCT VFR BLD AUTO: 33.3 % (ref 36–46)
HDLC SERPL-MCNC: 91.5 MG/DL
HGB BLD-MCNC: 10.6 G/DL (ref 12–16)
IMM GRANULOCYTES # BLD AUTO: 0.02 X10*3/UL (ref 0–0.5)
IMM GRANULOCYTES NFR BLD AUTO: 0.4 % (ref 0–0.9)
LDLC SERPL CALC-MCNC: 60 MG/DL
LYMPHOCYTES # BLD AUTO: 1.88 X10*3/UL (ref 0.8–3)
LYMPHOCYTES NFR BLD AUTO: 36.2 %
MCH RBC QN AUTO: 30.2 PG (ref 26–34)
MCHC RBC AUTO-ENTMCNC: 31.8 G/DL (ref 32–36)
MCV RBC AUTO: 95 FL (ref 80–100)
MONOCYTES # BLD AUTO: 0.47 X10*3/UL (ref 0.05–0.8)
MONOCYTES NFR BLD AUTO: 9 %
NEUTROPHILS # BLD AUTO: 2.56 X10*3/UL (ref 1.6–5.5)
NEUTROPHILS NFR BLD AUTO: 49.2 %
NON HDL CHOLESTEROL: 76 MG/DL (ref 0–149)
NRBC BLD-RTO: 0 /100 WBCS (ref 0–0)
PLATELET # BLD AUTO: 340 X10*3/UL (ref 150–450)
POTASSIUM SERPL-SCNC: 5.6 MMOL/L (ref 3.5–5.3)
PROT SERPL-MCNC: 6.4 G/DL (ref 6.4–8.2)
RBC # BLD AUTO: 3.51 X10*6/UL (ref 4–5.2)
SODIUM SERPL-SCNC: 137 MMOL/L (ref 136–145)
T4 FREE SERPL-MCNC: 1.23 NG/DL (ref 0.61–1.12)
TRIGL SERPL-MCNC: 80 MG/DL (ref 0–149)
TSH SERPL-ACNC: 0.54 MIU/L (ref 0.44–3.98)
VLDL: 16 MG/DL (ref 0–40)
WBC # BLD AUTO: 5.2 X10*3/UL (ref 4.4–11.3)

## 2024-06-25 PROCEDURE — 80061 LIPID PANEL: CPT

## 2024-06-25 PROCEDURE — 84443 ASSAY THYROID STIM HORMONE: CPT

## 2024-06-25 PROCEDURE — 85025 COMPLETE CBC W/AUTO DIFF WBC: CPT

## 2024-06-25 PROCEDURE — 80053 COMPREHEN METABOLIC PANEL: CPT

## 2024-06-25 PROCEDURE — 36415 COLL VENOUS BLD VENIPUNCTURE: CPT

## 2024-06-25 PROCEDURE — 84439 ASSAY OF FREE THYROXINE: CPT

## 2024-07-01 ENCOUNTER — LAB (OUTPATIENT)
Dept: LAB | Facility: LAB | Age: 79
End: 2024-07-01
Payer: MEDICARE

## 2024-07-01 DIAGNOSIS — E87.5 HYPERKALEMIA: Primary | ICD-10-CM

## 2024-07-01 DIAGNOSIS — E87.5 HYPERKALEMIA: ICD-10-CM

## 2024-07-01 LAB — POTASSIUM SERPL-SCNC: 5.6 MMOL/L (ref 3.5–5.3)

## 2024-07-01 PROCEDURE — 84132 ASSAY OF SERUM POTASSIUM: CPT

## 2024-07-01 PROCEDURE — 36415 COLL VENOUS BLD VENIPUNCTURE: CPT

## 2024-07-09 ENCOUNTER — PATIENT MESSAGE (OUTPATIENT)
Dept: PRIMARY CARE | Facility: CLINIC | Age: 79
End: 2024-07-09
Payer: MEDICARE

## 2024-07-09 DIAGNOSIS — E78.2 MIXED HYPERLIPIDEMIA: ICD-10-CM

## 2024-07-09 RX ORDER — LOVASTATIN 40 MG/1
40 TABLET ORAL DAILY
Qty: 90 TABLET | Refills: 2 | Status: SHIPPED | OUTPATIENT
Start: 2024-07-09

## 2024-07-15 ENCOUNTER — APPOINTMENT (OUTPATIENT)
Dept: PRIMARY CARE | Facility: CLINIC | Age: 79
End: 2024-07-15
Payer: MEDICARE

## 2024-07-15 VITALS
TEMPERATURE: 98.8 F | BODY MASS INDEX: 35.68 KG/M2 | WEIGHT: 209 LBS | HEIGHT: 64 IN | DIASTOLIC BLOOD PRESSURE: 60 MMHG | RESPIRATION RATE: 16 BRPM | OXYGEN SATURATION: 96 % | SYSTOLIC BLOOD PRESSURE: 112 MMHG | HEART RATE: 81 BPM

## 2024-07-15 DIAGNOSIS — I10 ESSENTIAL HYPERTENSION: Primary | ICD-10-CM

## 2024-07-15 DIAGNOSIS — D64.9 CHRONIC ANEMIA: ICD-10-CM

## 2024-07-15 DIAGNOSIS — E87.5 HYPERKALEMIA: ICD-10-CM

## 2024-07-15 DIAGNOSIS — N18.32 STAGE 3B CHRONIC KIDNEY DISEASE (MULTI): ICD-10-CM

## 2024-07-15 DIAGNOSIS — E03.9 ACQUIRED HYPOTHYROIDISM: ICD-10-CM

## 2024-07-15 DIAGNOSIS — E66.01 CLASS 2 SEVERE OBESITY DUE TO EXCESS CALORIES WITH SERIOUS COMORBIDITY AND BODY MASS INDEX (BMI) OF 35.0 TO 35.9 IN ADULT (MULTI): ICD-10-CM

## 2024-07-15 DIAGNOSIS — F33.1 MODERATE EPISODE OF RECURRENT MAJOR DEPRESSIVE DISORDER (MULTI): ICD-10-CM

## 2024-07-15 DIAGNOSIS — E78.2 MIXED HYPERLIPIDEMIA: ICD-10-CM

## 2024-07-15 PROCEDURE — 3078F DIAST BP <80 MM HG: CPT | Performed by: FAMILY MEDICINE

## 2024-07-15 PROCEDURE — 99214 OFFICE O/P EST MOD 30 MIN: CPT | Performed by: FAMILY MEDICINE

## 2024-07-15 PROCEDURE — 3074F SYST BP LT 130 MM HG: CPT | Performed by: FAMILY MEDICINE

## 2024-07-15 PROCEDURE — 1160F RVW MEDS BY RX/DR IN RCRD: CPT | Performed by: FAMILY MEDICINE

## 2024-07-15 PROCEDURE — 1159F MED LIST DOCD IN RCRD: CPT | Performed by: FAMILY MEDICINE

## 2024-07-15 PROCEDURE — 1036F TOBACCO NON-USER: CPT | Performed by: FAMILY MEDICINE

## 2024-07-15 PROCEDURE — 1123F ACP DISCUSS/DSCN MKR DOCD: CPT | Performed by: FAMILY MEDICINE

## 2024-07-15 PROCEDURE — 1158F ADVNC CARE PLAN TLK DOCD: CPT | Performed by: FAMILY MEDICINE

## 2024-07-15 RX ORDER — CHLORTHALIDONE 25 MG/1
25 TABLET ORAL DAILY
Qty: 90 TABLET | Refills: 1 | Status: SHIPPED | OUTPATIENT
Start: 2024-07-15

## 2024-07-15 RX ORDER — LISINOPRIL 10 MG/1
10 TABLET ORAL DAILY
Qty: 90 TABLET | Refills: 1 | Status: SHIPPED | OUTPATIENT
Start: 2024-07-15

## 2024-07-15 ASSESSMENT — ENCOUNTER SYMPTOMS
RHINORRHEA: 0
DIARRHEA: 0
FREQUENCY: 0
FEVER: 0
DEPRESSION: 1
HEMATURIA: 0
VOMITING: 0
NUMBNESS: 0
ABDOMINAL PAIN: 0
SORE THROAT: 0
CONFUSION: 1
DIZZINESS: 0
TREMORS: 0
PALPITATIONS: 0
SHORTNESS OF BREATH: 0
CONSTIPATION: 0
SLEEP QUALITY: FAIR
DEPRESSED MOOD: 1
DECREASED CONCENTRATION: 1
CHILLS: 0
HEADACHES: 0
COUGH: 0
DYSURIA: 0
WHEEZING: 0

## 2024-07-15 ASSESSMENT — PATIENT HEALTH QUESTIONNAIRE - PHQ9
10. IF YOU CHECKED OFF ANY PROBLEMS, HOW DIFFICULT HAVE THESE PROBLEMS MADE IT FOR YOU TO DO YOUR WORK, TAKE CARE OF THINGS AT HOME, OR GET ALONG WITH OTHER PEOPLE: SOMEWHAT DIFFICULT
2. FEELING DOWN, DEPRESSED OR HOPELESS: SEVERAL DAYS
SUM OF ALL RESPONSES TO PHQ9 QUESTIONS 1 AND 2: 2
1. LITTLE INTEREST OR PLEASURE IN DOING THINGS: SEVERAL DAYS

## 2024-07-15 NOTE — PROGRESS NOTES
Subjective   Patient ID: Marlene Alvarez is a 78 y.o. female who presents for Follow-up (Hypertension, Hyperlipidemia, Hypothyroidism, Depression and labs).    Patient is here for follow-up on hypertension and hyperlipidemia. She has no chest pain, dyspnea, exertional chest pressure/discomfort, near-syncope, orthopnea, palpitations, paroxysmal nocturnal dyspnea, and syncope. Taking her medication regularly with no side effects.    She presents for follow up of hypothyroidism. Current symptoms: none. She has no change in energy level, diarrhea, heat / cold intolerance, nervousness, palpitations, or weight changes.     She complains of an episode where she became confused while driving about 2 months ago. She notes it happened 2 days in a row and only occurred while driving. This has not occurred since. She had no weakness at the time of the episode.  She had no facial droop, no headache blurred vision or weakness of her extremities at the time.  The symptoms were transient as she was able to drive to her destination and return back home.  She has not had any further episodes since that time.  No tingling or numbness of her extremities.    Depression  Visit Type: follow-up  Patient presents with the following symptoms: confusion, decreased concentration and depressed mood.  Patient is not experiencing: excessive worry, palpitations, shortness of breath, suicidal ideas and suicidal planning.  Frequency of symptoms: occasionally    Sleep quality: fair  Nighttime awakenings: none  Compliance with medications:  %         Review of Systems   Constitutional:  Negative for chills and fever.   HENT:  Negative for congestion, ear pain, nosebleeds, rhinorrhea and sore throat.    Respiratory:  Negative for cough, shortness of breath and wheezing.    Cardiovascular:  Negative for chest pain, palpitations and leg swelling.   Gastrointestinal:  Negative for abdominal pain, constipation, diarrhea and vomiting.   Genitourinary:  " Negative for dysuria, frequency and hematuria.   Neurological:  Negative for dizziness, tremors, numbness and headaches.   Psychiatric/Behavioral:  Positive for confusion, decreased concentration and depression. Negative for suicidal ideas.        Objective   /60   Pulse 81   Temp 37.1 °C (98.8 °F)   Resp 16   Ht 1.626 m (5' 4\")   Wt 94.8 kg (209 lb)   SpO2 96%   BMI 35.87 kg/m²     Physical Exam  Constitutional:       General: She is not in acute distress.     Appearance: Normal appearance.   HENT:      Head: Normocephalic and atraumatic.      Mouth/Throat:      Mouth: Mucous membranes are moist.      Pharynx: Oropharynx is clear. No oropharyngeal exudate or posterior oropharyngeal erythema.   Eyes:      General: No scleral icterus.     Extraocular Movements: Extraocular movements intact.      Pupils: Pupils are equal, round, and reactive to light.   Cardiovascular:      Rate and Rhythm: Normal rate and regular rhythm.      Pulses: Normal pulses.      Heart sounds: No murmur heard.     No friction rub. No gallop.   Pulmonary:      Effort: Pulmonary effort is normal.      Breath sounds: No wheezing, rhonchi or rales.   Skin:     General: Skin is warm.      Coloration: Skin is not jaundiced or pale.      Findings: No erythema or rash.   Neurological:      General: No focal deficit present.      Mental Status: She is alert and oriented to person, place, and time.      Cranial Nerves: No cranial nerve deficit.      Sensory: No sensory deficit.      Coordination: Coordination normal.      Gait: Gait normal.         Lab on 07/01/2024   Component Date Value Ref Range Status    Potassium 07/01/2024 5.6 (H)  3.5 - 5.3 mmol/L Final     Assessment/Plan   Problem List Items Addressed This Visit       Acquired hypothyroidism     Well-controlled, continue current medications and management.         Relevant Orders    Thyroid Stimulating Hormone    Thyroxine, Free    Chronic anemia     Stable, continue current " medications and management.         Relevant Orders    CBC and Auto Differential    Class 2 severe obesity due to excess calories with serious comorbidity and body mass index (BMI) of 35.0 to 35.9 in adult (Multi)     Continue decrease calorie diet and not more than 1500 calorie per day diet and low-fat diet.  Continue with regular exercise program.  We advised exercise at least 5 days a week for at least 45 minutes and also a minimum of 10,000 steps a day.  The detrimental effects of obesity on health were discussed.         Essential hypertension - Primary     We will discontinue the Lisinopril HCT and start her on Chlorthalidone 25 mg daily due to the high potassium level.         Relevant Medications    chlorthalidone (Hygroton) 25 mg tablet    lisinopril 10 mg tablet    Other Relevant Orders    Follow Up In [facility] - PCP - Established    Comprehensive Metabolic Panel    Lipid Panel    Hyperkalemia     We will discontinue the Lisinopril HCT and start her on Chlorthalidone 25 mg daily.         Mixed hyperlipidemia     The nature of cardiac risk has been fully discussed with this patient. Discussed cardiovascular risk analysis and appropriate diet with the need for lifelong measures to reduce the risk. A regular exercise program is recommended to help achieve and maintain normal body weight, fitness and improve lipid balance. Patient education provided. They understand and agree with this course of treatment. They will return with new or worsening symptoms. Patient instructed to remain current with appropriate annual health maintenance.          Relevant Orders    Comprehensive Metabolic Panel    Lipid Panel    Moderate episode of recurrent major depressive disorder (Multi)     Stable, continue current medications and management.         Stage 3b chronic kidney disease (Multi)     Stable, continue current medications and management.          Scribe Attestation  By signing my name below, Ralph HOWELL  Soco Peters   attest that this documentation has been prepared under the direction and in the presence of Sheldon Up MD.

## 2024-07-15 NOTE — PATIENT INSTRUCTIONS
BMI was above normal measurement. Current weight: 94.8 kg (209 lb)  Weight change since last visit (-) denotes wt loss -3 lbs   Weight loss needed to achieve BMI 25: 63.7 Lbs  Weight loss needed to achieve BMI 30: 34.6 Lbs  Advised to Increase physical activity.

## 2024-07-15 NOTE — ASSESSMENT & PLAN NOTE
We will discontinue the Lisinopril HCT and start her on Chlorthalidone 25 mg daily due to the high potassium level.

## 2024-07-19 DIAGNOSIS — F33.1 MAJOR DEPRESSIVE DISORDER, RECURRENT, MODERATE (MULTI): ICD-10-CM

## 2024-07-19 RX ORDER — PAROXETINE HYDROCHLORIDE 40 MG/1
40 TABLET, FILM COATED ORAL DAILY
Qty: 90 TABLET | Refills: 1 | Status: SHIPPED | OUTPATIENT
Start: 2024-07-19

## 2024-07-19 NOTE — TELEPHONE ENCOUNTER
Rx Refill Request     Name: Marlene LINDQUIST Antonio  :  1945   Medication Name:  PARoxetine (Paxil) 40 mg tablet   Specific Pharmacy location:  [facility]/pharmacy [address] [place] [address]   Date of last appointment:  07/15/2024   Date of next appointment:  10/28/2024   Best number to reach patient:  [phone]

## 2024-07-25 ENCOUNTER — LAB (OUTPATIENT)
Dept: LAB | Facility: LAB | Age: 79
End: 2024-07-25
Payer: MEDICARE

## 2024-07-25 ENCOUNTER — TELEPHONE (OUTPATIENT)
Dept: HEMATOLOGY/ONCOLOGY | Facility: CLINIC | Age: 79
End: 2024-07-25
Payer: MEDICARE

## 2024-07-25 DIAGNOSIS — D64.9 CHRONIC ANEMIA: ICD-10-CM

## 2024-07-25 LAB
ALBUMIN SERPL BCP-MCNC: 4.4 G/DL (ref 3.4–5)
ALP SERPL-CCNC: 56 U/L (ref 33–136)
ALT SERPL W P-5'-P-CCNC: 23 U/L (ref 7–45)
ANION GAP SERPL CALC-SCNC: 15 MMOL/L (ref 10–20)
AST SERPL W P-5'-P-CCNC: 28 U/L (ref 9–39)
BASOPHILS # BLD AUTO: 0.06 X10*3/UL (ref 0–0.1)
BASOPHILS NFR BLD AUTO: 0.8 %
BILIRUB SERPL-MCNC: 0.2 MG/DL (ref 0–1.2)
BUN SERPL-MCNC: 58 MG/DL (ref 6–23)
CALCIUM SERPL-MCNC: 10.4 MG/DL (ref 8.6–10.3)
CHLORIDE SERPL-SCNC: 108 MMOL/L (ref 98–107)
CO2 SERPL-SCNC: 19 MMOL/L (ref 21–32)
CREAT SERPL-MCNC: 1.18 MG/DL (ref 0.5–1.05)
EGFRCR SERPLBLD CKD-EPI 2021: 48 ML/MIN/1.73M*2
EOSINOPHIL # BLD AUTO: 0.4 X10*3/UL (ref 0–0.4)
EOSINOPHIL NFR BLD AUTO: 5.1 %
ERYTHROCYTE [DISTWIDTH] IN BLOOD BY AUTOMATED COUNT: 13.8 % (ref 11.5–14.5)
GLUCOSE SERPL-MCNC: 98 MG/DL (ref 74–99)
HCT VFR BLD AUTO: 34.8 % (ref 36–46)
HGB BLD-MCNC: 11.2 G/DL (ref 12–16)
IMM GRANULOCYTES # BLD AUTO: 0.03 X10*3/UL (ref 0–0.5)
IMM GRANULOCYTES NFR BLD AUTO: 0.4 % (ref 0–0.9)
IRON SATN MFR SERPL: 20 % (ref 25–45)
IRON SERPL-MCNC: 76 UG/DL (ref 35–150)
LYMPHOCYTES # BLD AUTO: 1.56 X10*3/UL (ref 0.8–3)
LYMPHOCYTES NFR BLD AUTO: 19.7 %
MCH RBC QN AUTO: 30.7 PG (ref 26–34)
MCHC RBC AUTO-ENTMCNC: 32.2 G/DL (ref 32–36)
MCV RBC AUTO: 95 FL (ref 80–100)
MONOCYTES # BLD AUTO: 0.48 X10*3/UL (ref 0.05–0.8)
MONOCYTES NFR BLD AUTO: 6.1 %
NEUTROPHILS # BLD AUTO: 5.39 X10*3/UL (ref 1.6–5.5)
NEUTROPHILS NFR BLD AUTO: 67.9 %
NRBC BLD-RTO: 0 /100 WBCS (ref 0–0)
PLATELET # BLD AUTO: 329 X10*3/UL (ref 150–450)
POTASSIUM SERPL-SCNC: 5.3 MMOL/L (ref 3.5–5.3)
PROT SERPL-MCNC: 6.9 G/DL (ref 6.4–8.2)
RBC # BLD AUTO: 3.65 X10*6/UL (ref 4–5.2)
SODIUM SERPL-SCNC: 137 MMOL/L (ref 136–145)
TIBC SERPL-MCNC: 378 UG/DL (ref 240–445)
UIBC SERPL-MCNC: 302 UG/DL (ref 110–370)
VIT B12 SERPL-MCNC: 648 PG/ML (ref 211–911)
WBC # BLD AUTO: 7.9 X10*3/UL (ref 4.4–11.3)

## 2024-07-25 PROCEDURE — 82607 VITAMIN B-12: CPT

## 2024-07-25 PROCEDURE — 83540 ASSAY OF IRON: CPT

## 2024-07-25 PROCEDURE — 82668 ASSAY OF ERYTHROPOIETIN: CPT

## 2024-07-25 PROCEDURE — 36415 COLL VENOUS BLD VENIPUNCTURE: CPT

## 2024-07-25 PROCEDURE — 80053 COMPREHEN METABOLIC PANEL: CPT

## 2024-07-25 PROCEDURE — 83550 IRON BINDING TEST: CPT

## 2024-07-25 PROCEDURE — 85025 COMPLETE CBC W/AUTO DIFF WBC: CPT

## 2024-07-25 NOTE — TELEPHONE ENCOUNTER
Spoke with the patient. Pt aware new orders for lab work placed and is headed to [facility] lab now. Attempted phone number provided for [facility] lab and phone number doesn't work.

## 2024-07-25 NOTE — TELEPHONE ENCOUNTER
"MERLIN Ya did not leave number and was leaving at 12pm.  Lab closes at 4pm.  Patient was there to have labs drawn, but orders say \"clinic collect,\" which they cannot draw- needs to say \"lab collect.\"  Calling to ask for orders to be changed or to let them or patient know where to go to have done if not ok to be at [facility].  Patient  has an appointment tomorrow.  "

## 2024-07-25 NOTE — PROGRESS NOTES
Patient ID: Marlene Alvarez is a 78 y.o. female.    Subjective    HPI  Ms. Marlene Alvarez is a 77 y/o F presenting for initial consultation at the request of Dr. Up, for chronic anemia.      Most recent blood work on 1/2/2024 shows hemoglobin 11.2. Looking back a year ago she was 10.7 and 2 years ago was 11.34. MCV has always been normal. WBC and platelets also in the normal range. She did have iron studies, folate and B12 which were all normal. TSH checked in December 2023 was normal. Chemistry panel checked on 12/26/2023 does show some baseline creatinine elevation of 1.41. It looks like her creatinine has ranged from 1.4 to 1.5 over the last year. SPEP checked 1/2/2024 was negative.      Patient reports that she has had a 5-week history of itchiness. Denies any new rash and no history of rashes. She denies using any new products and no new medications or supplements. She has 2 cats. She denies any history of pruritus with her 4 pregnancies. No personal or FHx of blood clots or stroke. Denies any new chest pain, palpitations, cough or shortness of breath. No abdominal pain or discomfort. No new swelling. She has had a cholecystectomy. No other complaints today.      Interval Events:  1/26/2024: A telephone visit (audio only) between the patient at home and the provider at [facility] was utilized to provide this telehealth service.      Patient presents for follow-up for anemia. Labs reviewed from last week kappa/lambda 2.25 ratio. Retic index with hypoproliferation. Iron/folate/B12 WNL. No new complaints.    7/26/24: Patient presents for follow-up for anemia.    Blood work yesterday shows CBC with Hbg 11.2, chemistry panel with creatinine 1.18, iron saturation 20%, B 12 is normal, EPO is still pending.     Patient reports she is itchy at night at times. She reports it is getting harder for her to drive herself. No other major complaints at this time.     Patient's past medical history,  surgical history, family history and social history reviewed.    Objective      Vitals:    07/26/24 1102   BP: 151/70   Pulse: 89   Resp: 16   Temp: 36.3 °C (97.3 °F)   SpO2: 98%          Review of Systems:   Review of Systems:    Positive per HPI, otherwise negative.   Physical Exam  Gen: appears well in clinic, NAD  HEENT: atraumatic head, normocephalic, EOMI, conjunctiva normal  LUNG: no increased WOB, CTAB  CV: No JVD. RRR  GI: soft, NT, ND  LE: no LE edema  Skin: no obvious rashes or lesions on visible skin  Neuro: interactive, no focal deficits noted  Psych: normal mood and affect  Performance Status:  Symptomatic; fully ambulatory    Labs/Imaging/Pathology: personally reviewed reports and images in Epic electronic medical record system. Pertinent results as it related to the plan represented in below in assessment and plan.   Assessment/Plan   1. Anemia  2. CKD  3. Pruritus     - Patient has longstanding history of mild normocytic anemia for at least the last 1.5 years.  - Given the CDK likely multifactorial in the setting CKD versus any chronic inflammation, however she does report new pruritus over the last 5 weeks and we discussed different etiologies. She does not have a new rash, no new changes in medications or supplements or topical ointments or   changes in body wash, deodorant or lotion.    - She has never had pruritus with other health problems in the past.   - She does have a history of cholecystectomy. Denies any abdominal symptoms.   - No pruritus with any of her pregnancies.  - We discussed there could be a link with her anemia if she had an underlying myeloproliferative neoplasm.  - Today we will assess her anemia with a reticulocyte count to assess for marrow function.  - We will plan to check a kappa/lamda ratio to complete workup for a plasma cell dyscrasia.  - We will repeat a chemistry panel to ensure there is no other pathology from the liver and repeat kidney function.   - If all of the  blood work is unremarkable we could consider a bone marrow biopsy to assess further for an underlying MPN.   - RTC as a phone visit to discuss results in 1 week.      1/26/24: Telephone call  - we discussed anemia likely multifactorial   - likely some component of CKD and will check EPO level next visit  - we discussed retic index consistent with hypoproliferation and would recommend bone marrow evaluation as primary bone marrow dysfunction also in differential  - pt prefers to watch and wait and if Hgb drops below 10 would consider marrow to help determine etiology and if benefit from NOEMÍ    7/26/24:  - Hbg is 11.2 with no other cytopenias.  - We discussed that ultimately we can not rule out a primary bone marrow dysfunction.  - Patient would like to continue to monitor only at this time.   - She is having some pruritis and we discussed this could be a component of MPN's but she prefers to continue to monitor at this time.  - Follow-up in 6 months with repeat labs.     Reviewed ongoing medical problems and how they relate to her anemia, will continue long term monitoring.    RTC in 6 months with labs. This note has been transcribed using a medical scribe and there is a possibility of unintentional typing misprints.       Diagnoses and all orders for this visit:  Chronic anemia  -     Clinic Appointment Request  -     Clinic Appointment Request Virtual Est (phone end of day); Future  -     CBC and Auto Differential; Future  -     Comprehensive metabolic panel; Future  -     Iron and TIBC; Future  -     Lactate dehydrogenase; Future  -     Ferritin; Future         Dottie Knowles MD  Hematology/Oncology  [facility]    Scribe Attestation  By signing my name below, IHannah Scribe attest that this documentation has been prepared under the direction and in the presence of Dottie Knowles MD.     Time Spent  Prep time on day of patient encounter: 5 minutes  Time spent directly with patient,  family or caregiver: 15 minutes  Additional Time Spent on Patient Care Activities: 5 minutes  Documentation Time: 5 minutes  Other Time Spent: 0 minutes  Total: 30 minutes

## 2024-07-26 ENCOUNTER — PATIENT MESSAGE (OUTPATIENT)
Dept: PRIMARY CARE | Facility: CLINIC | Age: 79
End: 2024-07-26
Payer: MEDICARE

## 2024-07-26 ENCOUNTER — OFFICE VISIT (OUTPATIENT)
Dept: HEMATOLOGY/ONCOLOGY | Facility: CLINIC | Age: 79
End: 2024-07-26
Payer: MEDICARE

## 2024-07-26 VITALS
SYSTOLIC BLOOD PRESSURE: 151 MMHG | RESPIRATION RATE: 16 BRPM | BODY MASS INDEX: 35.99 KG/M2 | HEART RATE: 89 BPM | DIASTOLIC BLOOD PRESSURE: 70 MMHG | TEMPERATURE: 97.3 F | WEIGHT: 209.66 LBS | OXYGEN SATURATION: 98 %

## 2024-07-26 DIAGNOSIS — L29.9 PRURITUS OF SKIN: ICD-10-CM

## 2024-07-26 DIAGNOSIS — D64.9 CHRONIC ANEMIA: ICD-10-CM

## 2024-07-26 PROCEDURE — 3077F SYST BP >= 140 MM HG: CPT | Performed by: INTERNAL MEDICINE

## 2024-07-26 PROCEDURE — 99214 OFFICE O/P EST MOD 30 MIN: CPT | Performed by: INTERNAL MEDICINE

## 2024-07-26 PROCEDURE — 1126F AMNT PAIN NOTED NONE PRSNT: CPT | Performed by: INTERNAL MEDICINE

## 2024-07-26 PROCEDURE — 1123F ACP DISCUSS/DSCN MKR DOCD: CPT | Performed by: INTERNAL MEDICINE

## 2024-07-26 PROCEDURE — 3078F DIAST BP <80 MM HG: CPT | Performed by: INTERNAL MEDICINE

## 2024-07-26 PROCEDURE — 1159F MED LIST DOCD IN RCRD: CPT | Performed by: INTERNAL MEDICINE

## 2024-07-26 RX ORDER — HYDROXYZINE HYDROCHLORIDE 25 MG/1
25 TABLET, FILM COATED ORAL 3 TIMES DAILY
COMMUNITY
End: 2024-07-29 | Stop reason: SDUPTHER

## 2024-07-26 ASSESSMENT — PAIN SCALES - GENERAL: PAINLEVEL: 0-NO PAIN

## 2024-07-29 RX ORDER — HYDROXYZINE HYDROCHLORIDE 25 MG/1
25 TABLET, FILM COATED ORAL 3 TIMES DAILY
Qty: 60 TABLET | Refills: 1 | Status: SHIPPED | OUTPATIENT
Start: 2024-07-29

## 2024-07-31 LAB — EPO SERPL-ACNC: 6 MU/ML (ref 4–27)

## 2024-10-09 ENCOUNTER — LAB (OUTPATIENT)
Dept: LAB | Facility: LAB | Age: 79
End: 2024-10-09
Payer: MEDICARE

## 2024-10-09 DIAGNOSIS — E03.9 ACQUIRED HYPOTHYROIDISM: ICD-10-CM

## 2024-10-09 DIAGNOSIS — N39.46 MIXED INCONTINENCE: ICD-10-CM

## 2024-10-09 DIAGNOSIS — E78.2 MIXED HYPERLIPIDEMIA: ICD-10-CM

## 2024-10-09 DIAGNOSIS — I10 ESSENTIAL HYPERTENSION: ICD-10-CM

## 2024-10-09 DIAGNOSIS — D64.9 CHRONIC ANEMIA: ICD-10-CM

## 2024-10-09 LAB
ALBUMIN SERPL BCP-MCNC: 4.2 G/DL (ref 3.4–5)
ALP SERPL-CCNC: 67 U/L (ref 33–136)
ALT SERPL W P-5'-P-CCNC: 24 U/L (ref 7–45)
AMORPH CRY #/AREA UR COMP ASSIST: NORMAL /HPF
ANION GAP SERPL CALC-SCNC: 11 MMOL/L (ref 10–20)
APPEARANCE UR: CLEAR
AST SERPL W P-5'-P-CCNC: 27 U/L (ref 9–39)
BASOPHILS # BLD AUTO: 0.07 X10*3/UL (ref 0–0.1)
BASOPHILS NFR BLD AUTO: 1.2 %
BILIRUB SERPL-MCNC: 0.5 MG/DL (ref 0–1.2)
BILIRUB UR STRIP.AUTO-MCNC: NEGATIVE MG/DL
BUN SERPL-MCNC: 40 MG/DL (ref 6–23)
CALCIUM SERPL-MCNC: 10 MG/DL (ref 8.6–10.3)
CHLORIDE SERPL-SCNC: 106 MMOL/L (ref 98–107)
CHOLEST SERPL-MCNC: 183 MG/DL (ref 0–199)
CHOLESTEROL/HDL RATIO: 1.9
CO2 SERPL-SCNC: 23 MMOL/L (ref 21–32)
COLOR UR: ABNORMAL
CREAT SERPL-MCNC: 1.25 MG/DL (ref 0.5–1.05)
EGFRCR SERPLBLD CKD-EPI 2021: 44 ML/MIN/1.73M*2
EOSINOPHIL # BLD AUTO: 0.62 X10*3/UL (ref 0–0.4)
EOSINOPHIL NFR BLD AUTO: 10.7 %
ERYTHROCYTE [DISTWIDTH] IN BLOOD BY AUTOMATED COUNT: 13.3 % (ref 11.5–14.5)
GLUCOSE SERPL-MCNC: 87 MG/DL (ref 74–99)
GLUCOSE UR STRIP.AUTO-MCNC: NORMAL MG/DL
HCT VFR BLD AUTO: 35.2 % (ref 36–46)
HDLC SERPL-MCNC: 94.1 MG/DL
HGB BLD-MCNC: 11.3 G/DL (ref 12–16)
IMM GRANULOCYTES # BLD AUTO: 0.01 X10*3/UL (ref 0–0.5)
IMM GRANULOCYTES NFR BLD AUTO: 0.2 % (ref 0–0.9)
KETONES UR STRIP.AUTO-MCNC: NEGATIVE MG/DL
LDLC SERPL CALC-MCNC: 74 MG/DL
LEUKOCYTE ESTERASE UR QL STRIP.AUTO: ABNORMAL
LYMPHOCYTES # BLD AUTO: 1.46 X10*3/UL (ref 0.8–3)
LYMPHOCYTES NFR BLD AUTO: 25.3 %
MCH RBC QN AUTO: 30.4 PG (ref 26–34)
MCHC RBC AUTO-ENTMCNC: 32.1 G/DL (ref 32–36)
MCV RBC AUTO: 95 FL (ref 80–100)
MONOCYTES # BLD AUTO: 0.43 X10*3/UL (ref 0.05–0.8)
MONOCYTES NFR BLD AUTO: 7.5 %
MUCOUS THREADS #/AREA URNS AUTO: NORMAL /LPF
NEUTROPHILS # BLD AUTO: 3.18 X10*3/UL (ref 1.6–5.5)
NEUTROPHILS NFR BLD AUTO: 55.1 %
NITRITE UR QL STRIP.AUTO: NEGATIVE
NON HDL CHOLESTEROL: 89 MG/DL (ref 0–149)
NRBC BLD-RTO: 0 /100 WBCS (ref 0–0)
PH UR STRIP.AUTO: 5 [PH]
PLATELET # BLD AUTO: 355 X10*3/UL (ref 150–450)
POTASSIUM SERPL-SCNC: 5.3 MMOL/L (ref 3.5–5.3)
PROT SERPL-MCNC: 6.6 G/DL (ref 6.4–8.2)
PROT UR STRIP.AUTO-MCNC: NEGATIVE MG/DL
RBC # BLD AUTO: 3.72 X10*6/UL (ref 4–5.2)
RBC # UR STRIP.AUTO: NEGATIVE /UL
RBC #/AREA URNS AUTO: NORMAL /HPF
SODIUM SERPL-SCNC: 135 MMOL/L (ref 136–145)
SP GR UR STRIP.AUTO: 1.01
SQUAMOUS #/AREA URNS AUTO: NORMAL /HPF
T4 FREE SERPL-MCNC: 1.08 NG/DL (ref 0.61–1.12)
TRIGL SERPL-MCNC: 74 MG/DL (ref 0–149)
TSH SERPL-ACNC: 0.89 MIU/L (ref 0.44–3.98)
UROBILINOGEN UR STRIP.AUTO-MCNC: NORMAL MG/DL
VLDL: 15 MG/DL (ref 0–40)
WBC # BLD AUTO: 5.8 X10*3/UL (ref 4.4–11.3)
WBC #/AREA URNS AUTO: NORMAL /HPF

## 2024-10-09 PROCEDURE — 87086 URINE CULTURE/COLONY COUNT: CPT

## 2024-10-09 PROCEDURE — 84443 ASSAY THYROID STIM HORMONE: CPT

## 2024-10-09 PROCEDURE — 81001 URINALYSIS AUTO W/SCOPE: CPT

## 2024-10-09 PROCEDURE — 80061 LIPID PANEL: CPT

## 2024-10-09 PROCEDURE — 84439 ASSAY OF FREE THYROXINE: CPT

## 2024-10-09 PROCEDURE — 80053 COMPREHEN METABOLIC PANEL: CPT

## 2024-10-09 PROCEDURE — 88112 CYTOPATH CELL ENHANCE TECH: CPT | Performed by: PATHOLOGY

## 2024-10-09 PROCEDURE — 88112 CYTOPATH CELL ENHANCE TECH: CPT

## 2024-10-09 PROCEDURE — 36415 COLL VENOUS BLD VENIPUNCTURE: CPT

## 2024-10-09 PROCEDURE — 85025 COMPLETE CBC W/AUTO DIFF WBC: CPT

## 2024-10-10 LAB
BACTERIA UR CULT: NORMAL
LABORATORY COMMENT REPORT: NORMAL
LABORATORY COMMENT REPORT: NORMAL
PATH REPORT.FINAL DX SPEC: NORMAL
PATH REPORT.GROSS SPEC: NORMAL
PATH REPORT.RELEVANT HX SPEC: NORMAL
PATH REPORT.TOTAL CANCER: NORMAL

## 2024-10-14 ENCOUNTER — APPOINTMENT (OUTPATIENT)
Dept: UROLOGY | Facility: CLINIC | Age: 79
End: 2024-10-14
Payer: MEDICARE

## 2024-10-15 DIAGNOSIS — N39.0 URINARY TRACT INFECTION WITHOUT HEMATURIA, SITE UNSPECIFIED: ICD-10-CM

## 2024-10-15 RX ORDER — CEPHALEXIN 500 MG/1
CAPSULE ORAL
Qty: 30 CAPSULE | Refills: 3 | Status: SHIPPED | OUTPATIENT
Start: 2024-10-15

## 2024-10-28 ENCOUNTER — APPOINTMENT (OUTPATIENT)
Dept: PRIMARY CARE | Facility: CLINIC | Age: 79
End: 2024-10-28
Payer: MEDICARE

## 2024-10-28 VITALS
HEIGHT: 64 IN | HEART RATE: 72 BPM | TEMPERATURE: 97.6 F | BODY MASS INDEX: 34.83 KG/M2 | RESPIRATION RATE: 14 BRPM | OXYGEN SATURATION: 98 % | SYSTOLIC BLOOD PRESSURE: 120 MMHG | DIASTOLIC BLOOD PRESSURE: 70 MMHG | WEIGHT: 204 LBS

## 2024-10-28 DIAGNOSIS — N18.32 STAGE 3B CHRONIC KIDNEY DISEASE (MULTI): ICD-10-CM

## 2024-10-28 DIAGNOSIS — I10 ESSENTIAL HYPERTENSION: ICD-10-CM

## 2024-10-28 DIAGNOSIS — E66.01 CLASS 2 SEVERE OBESITY DUE TO EXCESS CALORIES WITH SERIOUS COMORBIDITY AND BODY MASS INDEX (BMI) OF 35.0 TO 35.9 IN ADULT: ICD-10-CM

## 2024-10-28 DIAGNOSIS — F41.1 GENERALIZED ANXIETY DISORDER: ICD-10-CM

## 2024-10-28 DIAGNOSIS — Z23 NEED FOR INFLUENZA VACCINATION: ICD-10-CM

## 2024-10-28 DIAGNOSIS — F33.1 MODERATE EPISODE OF RECURRENT MAJOR DEPRESSIVE DISORDER: ICD-10-CM

## 2024-10-28 DIAGNOSIS — D64.9 CHRONIC ANEMIA: ICD-10-CM

## 2024-10-28 DIAGNOSIS — Z00.00 ROUTINE GENERAL MEDICAL EXAMINATION AT HEALTH CARE FACILITY: Primary | ICD-10-CM

## 2024-10-28 DIAGNOSIS — E66.812 CLASS 2 SEVERE OBESITY DUE TO EXCESS CALORIES WITH SERIOUS COMORBIDITY AND BODY MASS INDEX (BMI) OF 35.0 TO 35.9 IN ADULT: ICD-10-CM

## 2024-10-28 DIAGNOSIS — E78.2 MIXED HYPERLIPIDEMIA: ICD-10-CM

## 2024-10-28 DIAGNOSIS — E03.9 ACQUIRED HYPOTHYROIDISM: ICD-10-CM

## 2024-10-28 PROCEDURE — 1123F ACP DISCUSS/DSCN MKR DOCD: CPT | Performed by: FAMILY MEDICINE

## 2024-10-28 PROCEDURE — 99214 OFFICE O/P EST MOD 30 MIN: CPT | Performed by: FAMILY MEDICINE

## 2024-10-28 PROCEDURE — 1170F FXNL STATUS ASSESSED: CPT | Performed by: FAMILY MEDICINE

## 2024-10-28 PROCEDURE — 3074F SYST BP LT 130 MM HG: CPT | Performed by: FAMILY MEDICINE

## 2024-10-28 PROCEDURE — 1160F RVW MEDS BY RX/DR IN RCRD: CPT | Performed by: FAMILY MEDICINE

## 2024-10-28 PROCEDURE — G0008 ADMIN INFLUENZA VIRUS VAC: HCPCS | Performed by: FAMILY MEDICINE

## 2024-10-28 PROCEDURE — 1159F MED LIST DOCD IN RCRD: CPT | Performed by: FAMILY MEDICINE

## 2024-10-28 PROCEDURE — 1036F TOBACCO NON-USER: CPT | Performed by: FAMILY MEDICINE

## 2024-10-28 PROCEDURE — G0439 PPPS, SUBSEQ VISIT: HCPCS | Performed by: FAMILY MEDICINE

## 2024-10-28 PROCEDURE — 90662 IIV NO PRSV INCREASED AG IM: CPT | Performed by: FAMILY MEDICINE

## 2024-10-28 PROCEDURE — 3078F DIAST BP <80 MM HG: CPT | Performed by: FAMILY MEDICINE

## 2024-10-28 RX ORDER — BUSPIRONE HYDROCHLORIDE 5 MG/1
5 TABLET ORAL 2 TIMES DAILY
Qty: 60 TABLET | Refills: 3 | Status: SHIPPED | OUTPATIENT
Start: 2024-10-28

## 2024-10-28 RX ORDER — LEVOTHYROXINE SODIUM 150 UG/1
150 TABLET ORAL DAILY
Qty: 90 TABLET | Refills: 2 | Status: SHIPPED | OUTPATIENT
Start: 2024-10-28

## 2024-10-28 RX ORDER — TRAZODONE HYDROCHLORIDE 50 MG/1
50 TABLET ORAL NIGHTLY
Qty: 90 TABLET | Refills: 2 | Status: SHIPPED | OUTPATIENT
Start: 2024-10-28

## 2024-10-28 ASSESSMENT — PATIENT HEALTH QUESTIONNAIRE - PHQ9
2. FEELING DOWN, DEPRESSED OR HOPELESS: NOT AT ALL
1. LITTLE INTEREST OR PLEASURE IN DOING THINGS: NOT AT ALL
SUM OF ALL RESPONSES TO PHQ9 QUESTIONS 1 AND 2: 0

## 2024-10-28 ASSESSMENT — ENCOUNTER SYMPTOMS
HEMATURIA: 0
DYSURIA: 0
COMPULSIONS: 0
DIARRHEA: 0
VOMITING: 0
PANIC: 0
DECREASED CONCENTRATION: 1
CONSTIPATION: 0
DIZZINESS: 0
SHORTNESS OF BREATH: 0
DEPRESSED MOOD: 1
SORE THROAT: 0
INSOMNIA: 1
WHEEZING: 0
NUMBNESS: 0
PALPITATIONS: 0
RESTLESSNESS: 1
HYPERVENTILATION: 0
THOUGHT CONTENT - OBSESSIONS: 0
COUGH: 0
HEADACHES: 0
CONFUSION: 0
ABDOMINAL PAIN: 0
MUSCLE TENSION: 1
CHILLS: 0
IRRITABILITY: 1
FEVER: 0
FREQUENCY: 0
NERVOUS/ANXIOUS: 1
RHINORRHEA: 0
TREMORS: 0

## 2024-10-28 ASSESSMENT — ACTIVITIES OF DAILY LIVING (ADL)
DOING_HOUSEWORK: INDEPENDENT
DRESSING: INDEPENDENT
MANAGING_FINANCES: INDEPENDENT
BATHING: INDEPENDENT
GROCERY_SHOPPING: INDEPENDENT
TAKING_MEDICATION: INDEPENDENT

## 2024-10-31 ENCOUNTER — APPOINTMENT (OUTPATIENT)
Dept: ORTHOPEDIC SURGERY | Facility: CLINIC | Age: 79
End: 2024-10-31
Payer: MEDICARE

## 2024-11-06 DIAGNOSIS — N39.46 MIXED INCONTINENCE: ICD-10-CM

## 2024-11-06 RX ORDER — TAMSULOSIN HYDROCHLORIDE 0.4 MG/1
0.4 CAPSULE ORAL DAILY
Qty: 90 CAPSULE | Refills: 3 | Status: SHIPPED | OUTPATIENT
Start: 2024-11-06

## 2024-11-17 DIAGNOSIS — K21.9 GASTRO-ESOPHAGEAL REFLUX DISEASE WITHOUT ESOPHAGITIS: ICD-10-CM

## 2024-11-17 RX ORDER — FAMOTIDINE 20 MG/1
20 TABLET, FILM COATED ORAL 2 TIMES DAILY
Qty: 180 TABLET | Refills: 1 | Status: SHIPPED | OUTPATIENT
Start: 2024-11-17

## 2024-11-25 ENCOUNTER — APPOINTMENT (OUTPATIENT)
Dept: UROLOGY | Facility: CLINIC | Age: 79
End: 2024-11-25
Payer: MEDICARE

## 2024-11-25 VITALS
WEIGHT: 196.87 LBS | RESPIRATION RATE: 16 BRPM | SYSTOLIC BLOOD PRESSURE: 144 MMHG | BODY MASS INDEX: 33.79 KG/M2 | HEART RATE: 82 BPM | DIASTOLIC BLOOD PRESSURE: 86 MMHG

## 2024-11-25 DIAGNOSIS — N39.46 MIXED INCONTINENCE: ICD-10-CM

## 2024-11-25 DIAGNOSIS — R35.1 NOCTURIA: ICD-10-CM

## 2024-11-25 DIAGNOSIS — N39.0 URINARY TRACT INFECTION WITHOUT HEMATURIA, SITE UNSPECIFIED: ICD-10-CM

## 2024-11-25 DIAGNOSIS — N39.46 MIXED INCONTINENCE: Primary | ICD-10-CM

## 2024-11-25 LAB
POC APPEARANCE, URINE: CLEAR
POC BILIRUBIN, URINE: NEGATIVE
POC BLOOD, URINE: NEGATIVE
POC COLOR, URINE: YELLOW
POC GLUCOSE, URINE: NEGATIVE MG/DL
POC KETONES, URINE: NEGATIVE MG/DL
POC LEUKOCYTES, URINE: NEGATIVE
POC NITRITE,URINE: NEGATIVE
POC PH, URINE: 5.5 PH
POC PROTEIN, URINE: NEGATIVE MG/DL
POC SPECIFIC GRAVITY, URINE: 1.01
POC UROBILINOGEN, URINE: 0.2 EU/DL

## 2024-11-25 PROCEDURE — 51798 US URINE CAPACITY MEASURE: CPT | Performed by: UROLOGY

## 2024-11-25 PROCEDURE — 81003 URINALYSIS AUTO W/O SCOPE: CPT | Performed by: UROLOGY

## 2024-11-25 PROCEDURE — 99213 OFFICE O/P EST LOW 20 MIN: CPT | Performed by: UROLOGY

## 2024-11-25 PROCEDURE — 52000 CYSTOURETHROSCOPY: CPT | Performed by: UROLOGY

## 2024-11-25 RX ORDER — CEPHALEXIN 500 MG/1
500 CAPSULE ORAL 2 TIMES WEEKLY
Qty: 30 CAPSULE | Refills: 3 | Status: SHIPPED | OUTPATIENT
Start: 2024-11-25

## 2024-11-25 RX ORDER — TAMSULOSIN HYDROCHLORIDE 0.4 MG/1
0.4 CAPSULE ORAL DAILY
Qty: 90 CAPSULE | Refills: 3 | Status: SHIPPED | OUTPATIENT
Start: 2024-11-25

## 2024-11-25 RX ORDER — MIRABEGRON 50 MG/1
50 TABLET, FILM COATED, EXTENDED RELEASE ORAL DAILY
Qty: 90 TABLET | Refills: 3 | Status: SHIPPED | OUTPATIENT
Start: 2024-11-25

## 2024-11-25 NOTE — PROGRESS NOTES
Provider Impressions     79 year-old white female being referred by Dr. Up for GROSS HEMATURIA. A renal and bladder ultrasound was negative. Patient's  is also our patient. The patient's mother had bladder cancer, presumably from secondhand smoke. Patient's father had prostate cancer. 2 pack year cigarette smoking history.     01/19/15, patient states she only 1 episode of GROSS HEMATURIA with BLOOD CLOTS. Urine cytology is negative. Urine culture is mixed. She has no other complaints.     01/31/15, CYSTOSCOPY was negative. No evidence of tumors or stones. Lower half of the bladder with mild erythema. CYSTOCELE grade 2.     10/19/2015- Established pt here today for f/u and review of labs. She has no current concerns at this time. She indicates no observable blood in her urine and no urinary symptoms such as dysuria, frequency, and urgency.     10/25/2016â€“Established patient here today for her annual review of testing including urine studies. Patient reports she had no symptoms with most recent UTI. This was the only urinary tract infection of a UTI she had this year. Patient denies any current dysuria, frequency, urgency, or hematuria. She does report NOCTURIA 1-2 times nightly. She does have occasional LEAKAGE if she holds her urine to long. UAâ€“positive nitrate, 3+ esterase, greater than 30 WBCs, 0â€“2 rbc's. Urine cultureâ€“greater than 100,000 of Escherichia coliâ€“this has been treated. Urine cytologyâ€“negative for malignant cells, inflammatory cells present.     10/30/17, the patient has no new complaints. Urinalysis is negative and urine culture shows no growth. She will return in 1 year.      MACROBID  BACTRIM  allergies     10/29/18, patient has no new complaints. Urinalysis shows 33 red blood cells and urine cultures negative. In light of the HEMATURIA, I have ordered a cystoscopy and a renal and bladder ultrasound.     11/19/18, cystoscopy today does not reveal any source of hematuria.  No evidence of stones, lesions or tumors. Renal ultrasound shows stable right RENAL CYSTS. Urine cytology is negative. She will return in October 2019.     11/22/19, patient has no complaints. Urinalysis was negative, urine cultures positive for Escherichia coli which we will treat with Keflex, urine cytologies negative. She will return in 1 year.     March 14th 2021, telephone call, urine culture was positive for E. coli which was treated with Keflex     March 24th 2021, patient arrives alone. She is now having recurrent UTIs and incontinence. We will proceed with a cystoscopy, flow studies and discussion of treatment options. I suspect she will benefit from an alpha agent and daily cephalosporin for several months.     April 7, 2021, cystoscopy, flow studies and discussion of treatment options. Cystoscopy reveals severe erythema in the trigone, bladder base and posterior bladder. No glomerulations. Flow rate of 3 cc/s. She continues to complain of poor bladder emptying and double voiding. We will initiate Flomax and Keflex at the 250 mg dose daily. She will return in 3 months.     August 25, 2021, patient arrives alone. She states that since beginning daily Flomax and Keflex at the 250 mg dose, she has not had another UTI, she no longer has poor bladder emptying and she no longer has double voiding. She will continue on this regimen and we will reevaluate in 3 months. Urinalysis and urine culture negative.     November 22, 2021, cystoscopy today shows much improvement with less erythema throughout the bladder. Patient has not had a UTI in over a year. She is doing very well with her regimen of Flomax and daily Keflex 250 mg. We will transition to twice a week Keflex 500 mg. Urine cytology could not rule out neoplasm however there was no evidence on cystoscopy. Flow rate of 11 cc/s, total volume 98 cc, PVR 45 cc. Urine culture no growth, urinalysis no blood, urine cytology was atypical and cannot rule out neoplasm.  She will return in 1 year.     October 7, 2022, cystoscopy today shows significantly less erythema. No stones or tumors. Good flow rate of 10 cc/s, total volume 154 cc,'s PVR 7 cc. She continues on Flomax daily and Keflex 500 mg twice a week without a breakthrough infection in 2 years. Previously, UTI every other month for 18 months. Urinalysis and urine culture negative. She will return in 1 year.     November 1, 2023, cystoscopy today once again identifies significant erythema in the bladder base and trigone.  No evidence of stones or tumors.  Flow rate 16 cc/s, total volume 178 cc, PVR 8 cc.  Patient continues on daily Flomax and Keflex 500 mg twice a week without a breakthrough infection in 3 years.  Previously she experienced a UTI every other month over an 18-month..  Urinalysis and urine culture were both negative.  Urine cytology was negative for malignant cells.  Patient's #1 complaint is incontinence and I will add Myrbetriq at the 50 mg dose to her regimen.  She will return in 1 year.    November 25, 2024, cystoscopy today shows only moderate erythema posteriorly.  No evidence of stones or tumors.  PVR 26 cc.  She continues on daily Flomax and Keflex 500 mg twice a week without a breakthrough UTI in 4 years.  Previously UTI every other month for an 18-month period.  Also, adding Myrbetriq 50 mg to her daily regimen has dramatically decreased her urinary incontinence and she is quite pleased.  Urine cytology was atypical, cannot rule out neoplasm however cystoscopy was negative.  Urine culture no growth and urinalysis shows only 2 red blood cells.  We will see her again in 1 year.     PLAN:     #1 to return in October 2025 with urine studies, PVR and cystoscopy to evaluate severe erythema in the trigone, base and posterior bladder KEFLEX 250 mg p.o. every 12 hours, 4 doses prophylaxis.     2. Continue Flomax 0.4 mg, Myrbetriq 50 mg p.o. daily and  Keflex 500 mg twice a week      Physical Exam  Vitals and  nursing note reviewed. Exam conducted with a chaperone present.   Constitutional:       Appearance: Normal appearance.   HENT:      Head: Normocephalic and atraumatic.   Pulmonary:      Effort: Pulmonary effort is normal.   Abdominal:      Palpations: Abdomen is soft.      Tenderness: There is no abdominal tenderness.   Genitourinary:     General: Normal vulva.      Vagina: No vaginal discharge.   Musculoskeletal:         General: Normal range of motion.      Cervical back: Normal range of motion and neck supple.   Neurological:      General: No focal deficit present.      Mental Status: She is alert and oriented to person, place, and time.   Psychiatric:         Mood and Affect: Mood normal.         Behavior: Behavior normal.          This note was created with voice-recognition software and was not corrected for typographical or grammatical errors

## 2024-11-25 NOTE — PROGRESS NOTES
"Patient ID: Zoë Alvarez is a 79 y.o. female.  Pt denies any pain today. States has not had any recent hospital admits or surgeries since their last visit. Denies any concerns about falling or safety. JML  She continues to take Myrbetriq, flomax, and Keflex       Procedures  Pt took macrodantin 50mg po as prescribed  Anesthesia: Local 2% Lidocaine  Instruments: 6F flexible disposable cystoscope     Pt brought to procedure room and placed in dorsal lithotomy position. Pt draped and prepped in normal sterile fashion. 5ml lidocaine instilled into urethral meatus and 5ml instilled into vagina. Pt tolerated well.     I was present as chaperone for the entirety of the procedure   Barbara Bland  Cystoscopy performed by Dr. Greg Dillon   Bedside \"Time Out\" Verification   Today's Date:11/25/2024  . I attest that this time out verification took place prior to the procedure.   Procedure: cysto   RN/LPN/MA:CIARA   Provider: WAL.   Verified By: RN/LPN/MA, CIARA and Provider.   Prior to the start of the procedure a time out was taken and the following were verified: the identity of the patient using two patient identifiers, the correct procedure, the correct site marked as indicated, the correct positioning for the patient and the correct equipment was obtained.   Cystoscopy - female  ZOË ALVAREZ identified using two (2) forms of identification.   Procedure: diagnostic cystourethroscopy.  Procedure Note: Time Started: 1:00PM Time Completed: 1:11 PM  Indications for procedure: irritable voiding symptoms.   Discussed with patient: Risks, benefits, and alternative were discussed in detail. Patient appears to understand and agrees to proceed. Patient has signed the procedure consent form.    CYSTOSCOPY:    Cystoscopy today reveals a normal urethra and bladder neck.  Once inside the bladder, both ureteral orifices were identified.  Moderate erythema seen only posteriorly.  No evidence of stones or tumors.  PVR 26 cc.  "

## 2024-11-25 NOTE — PATIENT INSTRUCTIONS
Patient Discussion/Summary     It was a pleasure seeing you again today. I am happy to learn that since starting your Flomax and Keflex, you no longer have incomplete emptying or double voiding. You also have not had a UTI in over 4 years. Your urinalysis and urine culture were normal . We will continue your daily Flomax and transition to twice a week Keflex.  With respect to urinary incontinence, we did add Myrbetriq at the 50 mg dose to take daily.  I have seen dramatically less urinary leakage since beginning that medication.  I will see you again in 1 year.       This note was created with voice-recognition software and was not corrected for typographical or grammatical errors.

## 2024-11-27 DIAGNOSIS — F41.1 GENERALIZED ANXIETY DISORDER: ICD-10-CM

## 2024-11-28 RX ORDER — BUSPIRONE HYDROCHLORIDE 5 MG/1
5 TABLET ORAL 2 TIMES DAILY
Qty: 180 TABLET | Refills: 1 | Status: SHIPPED | OUTPATIENT
Start: 2024-11-28

## 2024-12-12 ENCOUNTER — APPOINTMENT (OUTPATIENT)
Dept: ORTHOPEDIC SURGERY | Facility: CLINIC | Age: 79
End: 2024-12-12
Payer: MEDICARE

## 2024-12-12 ENCOUNTER — APPOINTMENT (OUTPATIENT)
Dept: PRIMARY CARE | Facility: CLINIC | Age: 79
End: 2024-12-12
Payer: MEDICARE

## 2024-12-24 DIAGNOSIS — I10 ESSENTIAL HYPERTENSION: ICD-10-CM

## 2024-12-24 RX ORDER — CHLORTHALIDONE 25 MG/1
25 TABLET ORAL DAILY
Qty: 90 TABLET | Refills: 1 | Status: SHIPPED | OUTPATIENT
Start: 2024-12-24

## 2024-12-24 RX ORDER — LISINOPRIL 10 MG/1
10 TABLET ORAL DAILY
Qty: 90 TABLET | Refills: 1 | Status: SHIPPED | OUTPATIENT
Start: 2024-12-24

## 2025-01-03 ENCOUNTER — APPOINTMENT (OUTPATIENT)
Dept: HEMATOLOGY/ONCOLOGY | Facility: CLINIC | Age: 80
End: 2025-01-03
Payer: MEDICARE

## 2025-01-11 DIAGNOSIS — F33.1 MAJOR DEPRESSIVE DISORDER, RECURRENT, MODERATE: ICD-10-CM

## 2025-01-11 RX ORDER — PAROXETINE HYDROCHLORIDE 40 MG/1
40 TABLET, FILM COATED ORAL DAILY
Qty: 90 TABLET | Refills: 1 | Status: SHIPPED | OUTPATIENT
Start: 2025-01-11

## 2025-01-22 ENCOUNTER — LAB (OUTPATIENT)
Dept: LAB | Facility: LAB | Age: 80
End: 2025-01-22
Payer: MEDICARE

## 2025-01-22 DIAGNOSIS — E03.9 ACQUIRED HYPOTHYROIDISM: ICD-10-CM

## 2025-01-22 DIAGNOSIS — E78.2 MIXED HYPERLIPIDEMIA: ICD-10-CM

## 2025-01-22 DIAGNOSIS — I10 ESSENTIAL HYPERTENSION: ICD-10-CM

## 2025-01-22 DIAGNOSIS — D64.9 CHRONIC ANEMIA: ICD-10-CM

## 2025-01-22 LAB
ALBUMIN SERPL BCP-MCNC: 4.6 G/DL (ref 3.4–5)
ALP SERPL-CCNC: 65 U/L (ref 33–136)
ALT SERPL W P-5'-P-CCNC: 30 U/L (ref 7–45)
ANION GAP SERPL CALC-SCNC: 9 MMOL/L (ref 10–20)
AST SERPL W P-5'-P-CCNC: 40 U/L (ref 9–39)
BASOPHILS # BLD AUTO: 0.1 X10*3/UL (ref 0–0.1)
BASOPHILS NFR BLD AUTO: 1.9 %
BILIRUB SERPL-MCNC: 0.5 MG/DL (ref 0–1.2)
BUN SERPL-MCNC: 27 MG/DL (ref 6–23)
CALCIUM SERPL-MCNC: 10.3 MG/DL (ref 8.6–10.3)
CHLORIDE SERPL-SCNC: 103 MMOL/L (ref 98–107)
CHOLEST SERPL-MCNC: 257 MG/DL (ref 0–199)
CHOLESTEROL/HDL RATIO: 2.2
CO2 SERPL-SCNC: 32 MMOL/L (ref 21–32)
CREAT SERPL-MCNC: 1.07 MG/DL (ref 0.5–1.05)
EGFRCR SERPLBLD CKD-EPI 2021: 53 ML/MIN/1.73M*2
EOSINOPHIL # BLD AUTO: 0.5 X10*3/UL (ref 0–0.4)
EOSINOPHIL NFR BLD AUTO: 9.5 %
ERYTHROCYTE [DISTWIDTH] IN BLOOD BY AUTOMATED COUNT: 14.6 % (ref 11.5–14.5)
GLUCOSE SERPL-MCNC: 91 MG/DL (ref 74–99)
HCT VFR BLD AUTO: 38.8 % (ref 36–46)
HDLC SERPL-MCNC: 115.1 MG/DL
HGB BLD-MCNC: 12.8 G/DL (ref 12–16)
IMM GRANULOCYTES # BLD AUTO: 0.02 X10*3/UL (ref 0–0.5)
IMM GRANULOCYTES NFR BLD AUTO: 0.4 % (ref 0–0.9)
LDLC SERPL CALC-MCNC: 118 MG/DL
LYMPHOCYTES # BLD AUTO: 1.56 X10*3/UL (ref 0.8–3)
LYMPHOCYTES NFR BLD AUTO: 29.6 %
MCH RBC QN AUTO: 30.1 PG (ref 26–34)
MCHC RBC AUTO-ENTMCNC: 33 G/DL (ref 32–36)
MCV RBC AUTO: 91 FL (ref 80–100)
MONOCYTES # BLD AUTO: 0.4 X10*3/UL (ref 0.05–0.8)
MONOCYTES NFR BLD AUTO: 7.6 %
NEUTROPHILS # BLD AUTO: 2.69 X10*3/UL (ref 1.6–5.5)
NEUTROPHILS NFR BLD AUTO: 51 %
NON HDL CHOLESTEROL: 142 MG/DL (ref 0–149)
NRBC BLD-RTO: 0 /100 WBCS (ref 0–0)
PLATELET # BLD AUTO: 367 X10*3/UL (ref 150–450)
POTASSIUM SERPL-SCNC: 4.5 MMOL/L (ref 3.5–5.3)
PROT SERPL-MCNC: 7.5 G/DL (ref 6.4–8.2)
RBC # BLD AUTO: 4.25 X10*6/UL (ref 4–5.2)
SODIUM SERPL-SCNC: 139 MMOL/L (ref 136–145)
T4 FREE SERPL-MCNC: 0.61 NG/DL (ref 0.61–1.12)
TRIGL SERPL-MCNC: 118 MG/DL (ref 0–149)
TSH SERPL-ACNC: 33.24 MIU/L (ref 0.44–3.98)
VLDL: 24 MG/DL (ref 0–40)
WBC # BLD AUTO: 5.3 X10*3/UL (ref 4.4–11.3)

## 2025-01-22 PROCEDURE — 80061 LIPID PANEL: CPT

## 2025-01-22 PROCEDURE — 84443 ASSAY THYROID STIM HORMONE: CPT

## 2025-01-22 PROCEDURE — 80053 COMPREHEN METABOLIC PANEL: CPT

## 2025-01-22 PROCEDURE — 85025 COMPLETE CBC W/AUTO DIFF WBC: CPT

## 2025-01-22 PROCEDURE — 84439 ASSAY OF FREE THYROXINE: CPT

## 2025-01-26 NOTE — PROGRESS NOTES
Consent:  Verbal consent was requested and obtained from patient on this date for a telehealth visit.    Patient ID: Marlene Alvarez is a 79 y.o. female.    Subjective    HPI  Ms. Marlene Alvarez is a 79 y/o F presenting for initial consultation at the request of Dr. Up, for chronic anemia.      Most recent blood work on 1/2/2024 shows hemoglobin 11.2. Looking back a year ago she was 10.7 and 2 years ago was 11.34. MCV has always been normal. WBC and platelets also in the normal range. She did have iron studies, folate and B12 which were all normal. TSH checked in December 2023 was normal. Chemistry panel checked on 12/26/2023 does show some baseline creatinine elevation of 1.41. It looks like her creatinine has ranged from 1.4 to 1.5 over the last year. SPEP checked 1/2/2024 was negative.      Patient reports that she has had a 5-week history of itchiness. Denies any new rash and no history of rashes. She denies using any new products and no new medications or supplements. She has 2 cats. She denies any history of pruritus with her 4 pregnancies. No personal or FHx of blood clots or stroke. Denies any new chest pain, palpitations, cough or shortness of breath. No abdominal pain or discomfort. No new swelling. She has had a cholecystectomy. No other complaints today.      Interval Events:  1/26/2024: A telephone visit (audio only) between the patient at home and the provider at [facility] [place] was utilized to provide this telehealth service.      Patient presents for follow-up for anemia. Labs reviewed from last week kappa/lambda 2.25 ratio. Retic index with hypoproliferation. Iron/folate/B12 WNL. No new complaints.     7/26/24: Patient presents for follow-up for anemia.     Blood work yesterday shows CBC with Hbg 11.2, chemistry panel with creatinine 1.18, iron saturation 20%, B 12 is normal, EPO is still pending.      Patient reports she is itchy at night at times. She reports it is getting  harder for her to drive herself. No other major complaints at this time.     1/27/25: A telephone visit (audio only) between the patient at home and the provider at [facility] [place] was utilized to provide this telehealth service.      Patient presents for follow-up for anemia.      Patient's past medical history, surgical history, family history and social history reviewed.    Review of Systems:   Review of Systems:    Positive per HPI, otherwise negative.       Objective    There were no vitals taken for this visit.     Physical Exam  Gen: appears well in clinic, NAD  HEENT: atraumatic head, normocephalic, EOMI, conjunctiva normal  LUNG: no increased WOB, CTAB  CV: No JVD. RRR  GI: soft, NT, ND  LE: no LE edema  Skin: no obvious rashes or lesions on visible skin  Neuro: interactive, no focal deficits noted  Psych: normal mood and affect  Performance Status:  Symptomatic; fully ambulatory  Labs/Imaging/Pathology: personally reviewed reports and images in Epic electronic medical record system. Pertinent results as it related to the plan represented in below in assessment and plan.   Assessment/Plan    1. Anemia  2. CKD  3. Pruritus     - Patient has longstanding history of mild normocytic anemia for at least the last 1.5 years.  - Given the CDK likely multifactorial in the setting CKD versus any chronic inflammation, however she does report new pruritus over the last 5 weeks and we discussed different etiologies. She does not have a new rash, no new changes in medications or supplements or topical ointments or   changes in body wash, deodorant or lotion.    - She has never had pruritus with other health problems in the past.   - She does have a history of cholecystectomy. Denies any abdominal symptoms.   - No pruritus with any of her pregnancies.  - We discussed there could be a link with her anemia if she had an underlying myeloproliferative neoplasm.  - Today we will assess her anemia with a  reticulocyte count to assess for marrow function.  - We will plan to check a kappa/lamda ratio to complete workup for a plasma cell dyscrasia.  - We will repeat a chemistry panel to ensure there is no other pathology from the liver and repeat kidney function.   - If all of the blood work is unremarkable we could consider a bone marrow biopsy to assess further for an underlying MPN.   - RTC as a phone visit to discuss results in 1 week.      1/26/24: Telephone call  - we discussed anemia likely multifactorial   - likely some component of CKD and will check EPO level next visit  - we discussed retic index consistent with hypoproliferation and would recommend bone marrow evaluation as primary bone marrow dysfunction also in differential  - pt prefers to watch and wait and if Hgb drops below 10 would consider marrow to help determine etiology and if benefit from NOEMÍ     7/26/24:  - Hbg is 11.2 with no other cytopenias.  - We discussed that ultimately we can not rule out a primary bone marrow dysfunction.  - Patient would like to continue to monitor only at this time.   - She is having some pruritis and we discussed this could be a component of MPN's but she prefers to continue to monitor at this time.  - Follow-up in 6 months with repeat labs.     1/27/25: Telephone visit  -      Reviewed ongoing medical problems and how they relate to her anemia, will continue long term monitoring.    RTC . This note has been transcribed using a medical scribe and there is a possibility of unintentional typing misprints.       {[email or web address]:[id number]}    Dottie Knowles MD  Hematology/Oncology  [facility]    Scribe Attestation  By signing my name below, ITasia Scribe attest that this documentation has been prepared under the direction and in the presence of Dottie Knowles MD.

## 2025-01-27 ENCOUNTER — TELEMEDICINE (OUTPATIENT)
Dept: HEMATOLOGY/ONCOLOGY | Facility: CLINIC | Age: 80
End: 2025-01-27
Payer: MEDICARE

## 2025-01-27 DIAGNOSIS — D64.9 CHRONIC ANEMIA: ICD-10-CM

## 2025-01-28 ENCOUNTER — TELEPHONE (OUTPATIENT)
Dept: PRIMARY CARE | Facility: CLINIC | Age: 80
End: 2025-01-28

## 2025-01-28 ENCOUNTER — APPOINTMENT (OUTPATIENT)
Dept: PRIMARY CARE | Facility: CLINIC | Age: 80
End: 2025-01-28
Payer: MEDICARE

## 2025-01-28 VITALS
HEIGHT: 64 IN | WEIGHT: 200 LBS | OXYGEN SATURATION: 98 % | HEART RATE: 81 BPM | DIASTOLIC BLOOD PRESSURE: 78 MMHG | SYSTOLIC BLOOD PRESSURE: 124 MMHG | TEMPERATURE: 97.7 F | RESPIRATION RATE: 16 BRPM | BODY MASS INDEX: 34.15 KG/M2

## 2025-01-28 DIAGNOSIS — F51.04 CHRONIC INSOMNIA: ICD-10-CM

## 2025-01-28 DIAGNOSIS — D64.9 CHRONIC ANEMIA: ICD-10-CM

## 2025-01-28 DIAGNOSIS — R41.3 MEMORY IMPAIRMENT: ICD-10-CM

## 2025-01-28 DIAGNOSIS — I10 ESSENTIAL HYPERTENSION: Primary | ICD-10-CM

## 2025-01-28 DIAGNOSIS — N18.32 STAGE 3B CHRONIC KIDNEY DISEASE (MULTI): ICD-10-CM

## 2025-01-28 DIAGNOSIS — F33.1 MODERATE EPISODE OF RECURRENT MAJOR DEPRESSIVE DISORDER: ICD-10-CM

## 2025-01-28 DIAGNOSIS — E78.2 MIXED HYPERLIPIDEMIA: ICD-10-CM

## 2025-01-28 DIAGNOSIS — E03.9 ACQUIRED HYPOTHYROIDISM: ICD-10-CM

## 2025-01-28 DIAGNOSIS — F41.1 GENERALIZED ANXIETY DISORDER: ICD-10-CM

## 2025-01-28 PROBLEM — E66.812 CLASS 2 SEVERE OBESITY DUE TO EXCESS CALORIES WITH SERIOUS COMORBIDITY AND BODY MASS INDEX (BMI) OF 35.0 TO 35.9 IN ADULT: Status: RESOLVED | Noted: 2023-05-15 | Resolved: 2025-01-28

## 2025-01-28 PROBLEM — E66.01 CLASS 2 SEVERE OBESITY DUE TO EXCESS CALORIES WITH SERIOUS COMORBIDITY AND BODY MASS INDEX (BMI) OF 35.0 TO 35.9 IN ADULT: Status: RESOLVED | Noted: 2023-05-15 | Resolved: 2025-01-28

## 2025-01-28 PROCEDURE — G2211 COMPLEX E/M VISIT ADD ON: HCPCS | Performed by: FAMILY MEDICINE

## 2025-01-28 PROCEDURE — 3074F SYST BP LT 130 MM HG: CPT | Performed by: FAMILY MEDICINE

## 2025-01-28 PROCEDURE — 1036F TOBACCO NON-USER: CPT | Performed by: FAMILY MEDICINE

## 2025-01-28 PROCEDURE — 3078F DIAST BP <80 MM HG: CPT | Performed by: FAMILY MEDICINE

## 2025-01-28 PROCEDURE — 99214 OFFICE O/P EST MOD 30 MIN: CPT | Performed by: FAMILY MEDICINE

## 2025-01-28 PROCEDURE — 1123F ACP DISCUSS/DSCN MKR DOCD: CPT | Performed by: FAMILY MEDICINE

## 2025-01-28 PROCEDURE — 1160F RVW MEDS BY RX/DR IN RCRD: CPT | Performed by: FAMILY MEDICINE

## 2025-01-28 PROCEDURE — 1159F MED LIST DOCD IN RCRD: CPT | Performed by: FAMILY MEDICINE

## 2025-01-28 RX ORDER — LEVOTHYROXINE SODIUM 150 UG/1
150 TABLET ORAL EVERY MORNING
Qty: 90 TABLET | Refills: 0 | Status: SHIPPED | OUTPATIENT
Start: 2025-01-28

## 2025-01-28 RX ORDER — LOVASTATIN 40 MG/1
40 TABLET ORAL NIGHTLY
Qty: 90 TABLET | Refills: 1 | Status: SHIPPED | OUTPATIENT
Start: 2025-01-28

## 2025-01-28 RX ORDER — TRAZODONE HYDROCHLORIDE 50 MG/1
75 TABLET ORAL NIGHTLY
Qty: 135 TABLET | Refills: 0 | Status: SHIPPED | OUTPATIENT
Start: 2025-01-28

## 2025-01-28 RX ORDER — PAROXETINE HYDROCHLORIDE 20 MG/1
20 TABLET, FILM COATED ORAL EVERY MORNING
Qty: 90 TABLET | Refills: 0 | Status: SHIPPED | OUTPATIENT
Start: 2025-01-28

## 2025-01-28 ASSESSMENT — ENCOUNTER SYMPTOMS
COUGH: 0
TREMORS: 0
PANIC: 0
WHEEZING: 0
CONSTIPATION: 0
WEAKNESS: 0
VISUAL CHANGE: 0
IRRITABILITY: 0
ABDOMINAL PAIN: 0
CONFUSION: 1
SHORTNESS OF BREATH: 0
NERVOUS/ANXIOUS: 1
INSOMNIA: 1
PALPITATIONS: 0
RESTLESSNESS: 0
DEPRESSED MOOD: 0
VOMITING: 0
SORE THROAT: 0
DYSURIA: 0
HEADACHES: 0
NUMBNESS: 0
CHILLS: 0
VERTIGO: 0
FEVER: 0
DECREASED CONCENTRATION: 1
SLEEP DISTURBANCE: 1
RHINORRHEA: 0
FATIGUE: 0
DIARRHEA: 0
FREQUENCY: 0
DIZZINESS: 0
HEMATURIA: 0

## 2025-01-28 ASSESSMENT — ANXIETY QUESTIONNAIRES
6. BECOMING EASILY ANNOYED OR IRRITABLE: NOT AT ALL
IF YOU CHECKED OFF ANY PROBLEMS ON THIS QUESTIONNAIRE, HOW DIFFICULT HAVE THESE PROBLEMS MADE IT FOR YOU TO DO YOUR WORK, TAKE CARE OF THINGS AT HOME, OR GET ALONG WITH OTHER PEOPLE: NOT DIFFICULT AT ALL
5. BEING SO RESTLESS THAT IT IS HARD TO SIT STILL: NOT AT ALL
2. NOT BEING ABLE TO STOP OR CONTROL WORRYING: SEVERAL DAYS
7. FEELING AFRAID AS IF SOMETHING AWFUL MIGHT HAPPEN: NOT AT ALL
GAD7 TOTAL SCORE: 3
4. TROUBLE RELAXING: SEVERAL DAYS
3. WORRYING TOO MUCH ABOUT DIFFERENT THINGS: NOT AT ALL
1. FEELING NERVOUS, ANXIOUS, OR ON EDGE: SEVERAL DAYS

## 2025-01-28 ASSESSMENT — PATIENT HEALTH QUESTIONNAIRE - PHQ9
SUM OF ALL RESPONSES TO PHQ9 QUESTIONS 1 AND 2: 0
2. FEELING DOWN, DEPRESSED OR HOPELESS: NOT AT ALL
1. LITTLE INTEREST OR PLEASURE IN DOING THINGS: NOT AT ALL

## 2025-01-28 NOTE — ASSESSMENT & PLAN NOTE
We will increase the Trazodone to 75 mg nightly.   The risks and benefits of my recommendations, as well as other treatment options were discussed with the patient today.

## 2025-01-28 NOTE — ASSESSMENT & PLAN NOTE
Her recent TSH is significantly high at 33.  Her previous TSH over the last 2 years have been normal or slightly low normal.  He only misses medication occasionally they will see unsure if she inadvertently has been taking the medication regularly.  We discussed this with the patient and her daughter in attendance today.  The differential is remarkable between the current TSH and the previous TSH.  We will recommend making no significant change to the levothyroxine dose since she does not have other symptoms.  Will recheck the TSH in 3 months and manage appropriately at that time.  We will continue the current dose of Levothyroxine and make sure she takes it every morning.

## 2025-01-28 NOTE — TELEPHONE ENCOUNTER
Patient daughter came up to the office and states she wants to clarify how is the patient supposed to take her levothyroxine. In the chart it says 150mcg daily but states she doesn't remember if dr. Up told her to take half a pill 75 mcg)     Please advise

## 2025-01-28 NOTE — ASSESSMENT & PLAN NOTE
We will decrease the Paxil to 20 mg daily.  Reviewed concept of depression as biochemical imbalance of neurotransmitters and rationale for treatment. Instructed patient to contact office or on-call physician promptly should condition worsen or any new symptoms appear.

## 2025-01-28 NOTE — PROGRESS NOTES
Subjective   Patient ID: Marlene Alvarez is a 79 y.o. female who presents for Follow-up (Hypertension, Hyperlipidemia, Hypothyroidism, Anxiety, Depression and labs) and Insomnia.    Patient is here for follow-up on hypertension and hyperlipidemia. She has no chest pain, dyspnea, exertional chest pressure/discomfort, near-syncope, orthopnea, palpitations, paroxysmal nocturnal dyspnea, and syncope. She states there are times where she will miss her medication.    She presents for follow up of hypothyroidism. Current symptoms: none. She has no change in energy level, diarrhea, heat / cold intolerance, or palpitations.     She complains of memory impairment and confusion with unfamiliar places.    Anxiety  Presents for follow-up visit. Symptoms include confusion, decreased concentration, insomnia and nervous/anxious behavior. Patient reports no chest pain, depressed mood, dizziness, excessive worry, irritability, palpitations, panic, restlessness, shortness of breath or suicidal ideas. Symptoms occur occasionally. The severity of symptoms is mild. The patient sleeps 8 hours per night. The quality of sleep is good. Nighttime awakenings: occasional.     Compliance with medications is %.   Insomnia  This is a new problem. The current episode started in the past 7 days. The problem occurs constantly. The problem has been gradually worsening. Pertinent negatives include no abdominal pain, chest pain, chills, congestion, coughing, fatigue, fever, headaches, numbness, sore throat, vertigo, visual change, vomiting or weakness. Nothing aggravates the symptoms. She has tried nothing for the symptoms. The treatment provided no relief.        Review of Systems   Constitutional:  Negative for chills, fatigue, fever and irritability.   HENT:  Negative for congestion, ear pain, nosebleeds, rhinorrhea and sore throat.    Respiratory:  Negative for cough, shortness of breath and wheezing.    Cardiovascular:  Negative for chest  "pain, palpitations and leg swelling.   Gastrointestinal:  Negative for abdominal pain, constipation, diarrhea and vomiting.   Genitourinary:  Negative for dysuria, frequency and hematuria.   Neurological:  Negative for dizziness, vertigo, tremors, weakness, numbness and headaches.   Psychiatric/Behavioral:  Positive for confusion, decreased concentration and sleep disturbance. Negative for suicidal ideas. The patient is nervous/anxious and has insomnia.        Objective   /78   Pulse 81   Temp 36.5 °C (97.7 °F)   Resp 16   Ht 1.626 m (5' 4\")   Wt 90.7 kg (200 lb)   SpO2 98%   BMI 34.33 kg/m²     Physical Exam  Constitutional:       General: She is not in acute distress.     Appearance: Normal appearance.   HENT:      Head: Normocephalic and atraumatic.      Mouth/Throat:      Mouth: Mucous membranes are moist.      Pharynx: Oropharynx is clear. No oropharyngeal exudate or posterior oropharyngeal erythema.   Eyes:      General: No scleral icterus.     Extraocular Movements: Extraocular movements intact.      Pupils: Pupils are equal, round, and reactive to light.   Cardiovascular:      Rate and Rhythm: Normal rate and regular rhythm.      Pulses: Normal pulses.      Heart sounds: No murmur heard.     No friction rub. No gallop.   Pulmonary:      Effort: Pulmonary effort is normal.      Breath sounds: No wheezing, rhonchi or rales.   Skin:     General: Skin is warm.      Coloration: Skin is not jaundiced or pale.      Findings: No erythema or rash.   Neurological:      General: No focal deficit present.      Mental Status: She is alert and oriented to person, place, and time.      Cranial Nerves: No cranial nerve deficit.      Sensory: No sensory deficit.      Coordination: Coordination normal.      Gait: Gait normal.         Lab on 01/22/2025   Component Date Value Ref Range Status    Glucose 01/22/2025 91  74 - 99 mg/dL Final    Sodium 01/22/2025 139  136 - 145 mmol/L Final    Potassium 01/22/2025 4.5  " 3.5 - 5.3 mmol/L Final    Chloride 01/22/2025 103  98 - 107 mmol/L Final    Bicarbonate 01/22/2025 32  21 - 32 mmol/L Final    Anion Gap 01/22/2025 9 (L)  10 - 20 mmol/L Final    Urea Nitrogen 01/22/2025 27 (H)  6 - 23 mg/dL Final    Creatinine 01/22/2025 1.07 (H)  0.50 - 1.05 mg/dL Final    eGFR 01/22/2025 53 (L)  >60 mL/min/1.73m*2 Final    Calculations of estimated GFR are performed using the 2021 CKD-EPI Study Refit equation without the race variable for the IDMS-Traceable creatinine methods.  https://jasn.asnjournals.org/content/early/2021/09/22/ASN.2456141964    Calcium 01/22/2025 10.3  8.6 - 10.3 mg/dL Final    Albumin 01/22/2025 4.6  3.4 - 5.0 g/dL Final    Alkaline Phosphatase 01/22/2025 65  33 - 136 U/L Final    Total Protein 01/22/2025 7.5  6.4 - 8.2 g/dL Final    AST 01/22/2025 40 (H)  9 - 39 U/L Final    Bilirubin, Total 01/22/2025 0.5  0.0 - 1.2 mg/dL Final    ALT 01/22/2025 30  7 - 45 U/L Final    Patients treated with Sulfasalazine may generate falsely decreased results for ALT.    WBC 01/22/2025 5.3  4.4 - 11.3 x10*3/uL Final    nRBC 01/22/2025 0.0  0.0 - 0.0 /100 WBCs Final    RBC 01/22/2025 4.25  4.00 - 5.20 x10*6/uL Final    Hemoglobin 01/22/2025 12.8  12.0 - 16.0 g/dL Final    Hematocrit 01/22/2025 38.8  36.0 - 46.0 % Final    MCV 01/22/2025 91  80 - 100 fL Final    MCH 01/22/2025 30.1  26.0 - 34.0 pg Final    MCHC 01/22/2025 33.0  32.0 - 36.0 g/dL Final    RDW 01/22/2025 14.6 (H)  11.5 - 14.5 % Final    Platelets 01/22/2025 367  150 - 450 x10*3/uL Final    Neutrophils % 01/22/2025 51.0  40.0 - 80.0 % Final    Immature Granulocytes %, Automated 01/22/2025 0.4  0.0 - 0.9 % Final    Immature Granulocyte Count (IG) includes promyelocytes, myelocytes and metamyelocytes but does not include bands. Percent differential counts (%) should be interpreted in the context of the absolute cell counts (cells/UL).    Lymphocytes % 01/22/2025 29.6  13.0 - 44.0 % Final    Monocytes % 01/22/2025 7.6  2.0 - 10.0 %  Final    Eosinophils % 01/22/2025 9.5  0.0 - 6.0 % Final    Basophils % 01/22/2025 1.9  0.0 - 2.0 % Final    Neutrophils Absolute 01/22/2025 2.69  1.60 - 5.50 x10*3/uL Final    Percent differential counts (%) should be interpreted in the context of the absolute cell counts (cells/uL).    Immature Granulocytes Absolute, Au* 01/22/2025 0.02  0.00 - 0.50 x10*3/uL Final    Lymphocytes Absolute 01/22/2025 1.56  0.80 - 3.00 x10*3/uL Final    Monocytes Absolute 01/22/2025 0.40  0.05 - 0.80 x10*3/uL Final    Eosinophils Absolute 01/22/2025 0.50 (H)  0.00 - 0.40 x10*3/uL Final    Basophils Absolute 01/22/2025 0.10  0.00 - 0.10 x10*3/uL Final    Cholesterol 01/22/2025 257 (H)  0 - 199 mg/dL Final          Age      Desirable   Borderline High   High     0-19 Y     0 - 169       170 - 199     >/= 200    20-24 Y     0 - 189       190 - 224     >/= 225         >24 Y     0 - 199       200 - 239     >/= 240   **All ranges are based on fasting samples. Specific   therapeutic targets will vary based on patient-specific   cardiac risk.    Pediatric guidelines reference:Pediatrics 2011, 128(S5).Adult guidelines reference: NCEP ATPIII Guidelines,CEM 2001, 258:2486-97    Venipuncture immediately after or during the administration of Metamizole may lead to falsely low results. Testing should be performed immediately prior to Metamizole dosing.    HDL-Cholesterol 01/22/2025 115.1  mg/dL Final      Age       Very Low   Low     Normal    High    0-19 Y    < 35      < 40     40-45     ----  20-24 Y    ----     < 40      >45      ----        >24 Y      ----     < 40     40-60      >60      Cholesterol/HDL Ratio 01/22/2025 2.2   Final      Ref Values  Desirable  < 3.4  High Risk  > 5.0    LDL Calculated 01/22/2025 118 (H)  <=99 mg/dL Final                                Near   Borderline      AGE      Desirable  Optimal    High     High     Very High     0-19 Y     0 - 109     ---    110-129   >/= 130     ----    20-24 Y     0 - 119     ---     120-159   >/= 160     ----      >24 Y     0 -  99   100-129  130-159   160-189     >/=190      VLDL 01/22/2025 24  0 - 40 mg/dL Final    Triglycerides 01/22/2025 118  0 - 149 mg/dL Final    Age              Desirable        Borderline         High        Very High  SEX:B           mg/dL             mg/dL               mg/dL      mg/dL  <=14D                       ----               ----        ----  15D-365D                    ----               ----        ----  1Y-9Y           0-74               75-99             >=100       ----  10Y-19Y        0-89                            >=130       ----  20Y-24Y        0-114             115-149             >=150      ----  >= 25Y         0-149             150-199             200-499    >=500      Venipuncture immediately after or during the administration of Metamizole may lead to falsely low results. Testing should be performed immediately prior to Metamizole dosing.    Non HDL Cholesterol 01/22/2025 142  0 - 149 mg/dL Final          Age       Desirable   Borderline High   High     Very High     0-19 Y     0 - 119       120 - 144     >/= 145    >/= 160    20-24 Y     0 - 149       150 - 189     >/= 190      ----         >24 Y    30 mg/dL above LDL Cholesterol goal      Thyroid Stimulating Hormone 01/22/2025 33.24 (H)  0.44 - 3.98 mIU/L Final    Thyroxine, Free 01/22/2025 0.61  0.61 - 1.12 ng/dL Final     Assessment/Plan   Problem List Items Addressed This Visit       Acquired hypothyroidism     Her recent TSH is significantly high at 33.  Her previous TSH over the last 2 years have been normal or slightly low normal.  He only misses medication occasionally they will see unsure if she inadvertently has been taking the medication regularly.  We discussed this with the patient and her daughter in attendance today.  The differential is remarkable between the current TSH and the previous TSH.  We will recommend making no significant change to the levothyroxine  "dose since she does not have other symptoms.  Will recheck the TSH in 3 months and manage appropriately at that time.  We will continue the current dose of Levothyroxine and make sure she takes it every morning.           Relevant Medications    levothyroxine (Synthroid, Levoxyl) 150 mcg tablet    Other Relevant Orders    Follow Up In [facility] - PCP    Thyroid Stimulating Hormone    Thyroxine, Free    Chronic anemia     Well-controlled, continue current medications and management.         Relevant Orders    CBC and Auto Differential    Follow Up In [facility] - PCP    Stage 3b chronic kidney disease (Multi)     Well-controlled, continue current medications and management. Patient advised to avoid NSAIDS.          Relevant Orders    Comprehensive Metabolic Panel    Follow Up In [facility] - PCP    Essential hypertension - Primary     Well-controlled, continue current medications and management.  Dietary sodium restriction.  Regular aerobic exercise program is recommended to help achieve and maintain normal body weight, fitness and improve lipid balance. .  Dietary changes: Increase soluble fiber  Plant sterols 2grams per day (e.g. Benecol)  Reduce saturated fat, \"trans\" monounsaturated fatty acids, and cholesterol         Relevant Orders    Comprehensive Metabolic Panel    Follow Up In [facility] - PCP    Lipid Panel    Mixed hyperlipidemia     The nature of cardiac risk has been fully discussed with this patient. Discussed cardiovascular risk analysis and appropriate diet with the need for lifelong measures to reduce the risk. A regular exercise program is recommended to help achieve and maintain normal body weight, fitness and improve lipid balance. Patient education provided. They understand and agree with this course of treatment. They will return with new or worsening symptoms. Patient instructed to remain current with appropriate annual health maintenance.          " Relevant Medications    lovastatin (Mevacor) 40 mg tablet    Other Relevant Orders    Comprehensive Metabolic Panel    Follow Up In [facility] - PCP    Lipid Panel    Moderate episode of recurrent major depressive disorder     We will decrease the Paxil to 20 mg daily.  Reviewed concept of depression as biochemical imbalance of neurotransmitters and rationale for treatment. Instructed patient to contact office or on-call physician promptly should condition worsen or any new symptoms appear.         Relevant Medications    traZODone (Desyrel) 50 mg tablet    PARoxetine (Paxil) 20 mg tablet    Other Relevant Orders    Follow Up In [facility] - PCP    Generalized anxiety disorder     Stable, continue current medications and management.  The risks and benefits of my recommendations, as well as other treatment options were discussed with the patient today.    The side effects of the medications were discussed.  Advised not to take the medication with alcohol .  Exercise regularly and this can give you a sense of well being and help decrease feelings of anxiety.;  Get plenty of sleep. Sleep rests your brain as well as your body, and can improve your general sense of wellbeing as well as your mood.;  Avoid alcohol and drug abuse. It may seem that alcohol or drugs relax you. But in the long run they make anxiety worse and cause more problems.;  Avoid caffeine. Caffeine is found in coffee, tea, soft drinks and chocolate. Caffeine may increase your sense of anxiety because it stimulates your nervous system. Also avoid over-the-counter diet pills, and cough and cold medicines that contain a decongestant.;  Confront the things that have made you anxious in the past. Begin by just picturing yourself confronting these things. By doing this, you can get used to the idea of confronting the things that make you anxious before you actually do it. After you feel more comfortable picturing yourself confronting  these things, you can begin to actually face them.;  If you feel yourself getting anxious, practice a relaxation technique or focus on a simple task, such as counting backward from 100 to 0.;  Although feelings of anxiety are scary, they won't hurt you. Label the level of your fear from 0 to 10 and keep track as it goes up and down. Notice that it doesn't stay at a very high level for more than a few seconds. When the fear comes, accept it. Wait and give it time to pass without running away from it.;  Take medications as prescribed.         Relevant Medications    PARoxetine (Paxil) 20 mg tablet    Other Relevant Orders    Follow Up In [facility] - PCP    Chronic insomnia     We will increase the Trazodone to 75 mg nightly.   The risks and benefits of my recommendations, as well as other treatment options were discussed with the patient today.          Relevant Medications    traZODone (Desyrel) 50 mg tablet    Memory impairment     We will refer her to the Neurologist for further evaluation.         Relevant Orders    Referral to Neurology    Vitamin B12    Folate     Scribe Attestation  By signing my name below, I, Soco Mckenzie   attest that this documentation has been prepared under the direction and in the presence of Sheldon Up MD.

## 2025-01-30 LAB
ALBUMIN SERPL-MCNC: 4.5 G/DL (ref 3.6–5.1)
ALP SERPL-CCNC: 61 U/L (ref 37–153)
ALT SERPL-CCNC: 31 U/L (ref 6–29)
ANION GAP SERPL CALCULATED.4IONS-SCNC: 11 MMOL/L (CALC) (ref 7–17)
AST SERPL-CCNC: 54 U/L (ref 10–35)
BASOPHILS # BLD AUTO: 89 CELLS/UL (ref 0–200)
BASOPHILS NFR BLD AUTO: 1.5 %
BILIRUB SERPL-MCNC: 0.5 MG/DL (ref 0.2–1.2)
BUN SERPL-MCNC: 35 MG/DL (ref 7–25)
CALCIUM SERPL-MCNC: 10.4 MG/DL (ref 8.6–10.4)
CHLORIDE SERPL-SCNC: 102 MMOL/L (ref 98–110)
CHOLEST SERPL-MCNC: 252 MG/DL
CHOLEST/HDLC SERPL: 2 (CALC)
CO2 SERPL-SCNC: 23 MMOL/L (ref 20–32)
CREAT SERPL-MCNC: 1.29 MG/DL (ref 0.6–1)
EGFRCR SERPLBLD CKD-EPI 2021: 42 ML/MIN/1.73M2
EOSINOPHIL # BLD AUTO: 478 CELLS/UL (ref 15–500)
EOSINOPHIL NFR BLD AUTO: 8.1 %
ERYTHROCYTE [DISTWIDTH] IN BLOOD BY AUTOMATED COUNT: 12.6 % (ref 11–15)
GLUCOSE SERPL-MCNC: 84 MG/DL (ref 65–99)
HCT VFR BLD AUTO: 38.6 % (ref 35–45)
HDLC SERPL-MCNC: 123 MG/DL
HGB BLD-MCNC: 12.6 G/DL (ref 11.7–15.5)
LDLC SERPL CALC-MCNC: 111 MG/DL (CALC)
LYMPHOCYTES # BLD AUTO: 1375 CELLS/UL (ref 850–3900)
LYMPHOCYTES NFR BLD AUTO: 23.3 %
MCH RBC QN AUTO: 30 PG (ref 27–33)
MCHC RBC AUTO-ENTMCNC: 32.6 G/DL (ref 32–36)
MCV RBC AUTO: 91.9 FL (ref 80–100)
MONOCYTES # BLD AUTO: 360 CELLS/UL (ref 200–950)
MONOCYTES NFR BLD AUTO: 6.1 %
NEUTROPHILS # BLD AUTO: 3599 CELLS/UL (ref 1500–7800)
NEUTROPHILS NFR BLD AUTO: 61 %
NONHDLC SERPL-MCNC: 129 MG/DL (CALC)
PLATELET # BLD AUTO: 362 THOUSAND/UL (ref 140–400)
PMV BLD REES-ECKER: 9.9 FL (ref 7.5–12.5)
POTASSIUM SERPL-SCNC: 5.3 MMOL/L (ref 3.5–5.3)
PROT SERPL-MCNC: 7.2 G/DL (ref 6.1–8.1)
RBC # BLD AUTO: 4.2 MILLION/UL (ref 3.8–5.1)
SODIUM SERPL-SCNC: 136 MMOL/L (ref 135–146)
T4 FREE SERPL-MCNC: 0.8 NG/DL (ref 0.8–1.8)
TRIGL SERPL-MCNC: 87 MG/DL
TSH SERPL-ACNC: 34.05 MIU/L (ref 0.4–4.5)
WBC # BLD AUTO: 5.9 THOUSAND/UL (ref 3.8–10.8)

## 2025-01-31 ENCOUNTER — TELEMEDICINE (OUTPATIENT)
Dept: HEMATOLOGY/ONCOLOGY | Facility: CLINIC | Age: 80
End: 2025-01-31
Payer: MEDICARE

## 2025-01-31 ENCOUNTER — LAB (OUTPATIENT)
Dept: LAB | Facility: HOSPITAL | Age: 80
End: 2025-01-31
Payer: MEDICARE

## 2025-01-31 DIAGNOSIS — D64.9 CHRONIC ANEMIA: ICD-10-CM

## 2025-01-31 LAB
ALBUMIN SERPL BCP-MCNC: 4.6 G/DL (ref 3.4–5)
ALP SERPL-CCNC: 62 U/L (ref 33–136)
ALT SERPL W P-5'-P-CCNC: 31 U/L (ref 7–45)
ANION GAP SERPL CALC-SCNC: 13 MMOL/L (ref 10–20)
AST SERPL W P-5'-P-CCNC: 40 U/L (ref 9–39)
BASOPHILS # BLD AUTO: 0.1 X10*3/UL (ref 0–0.1)
BASOPHILS NFR BLD AUTO: 1.4 %
BILIRUB SERPL-MCNC: 0.4 MG/DL (ref 0–1.2)
BUN SERPL-MCNC: 39 MG/DL (ref 6–23)
CALCIUM SERPL-MCNC: 10 MG/DL (ref 8.6–10.3)
CHLORIDE SERPL-SCNC: 104 MMOL/L (ref 98–107)
CO2 SERPL-SCNC: 26 MMOL/L (ref 21–32)
CREAT SERPL-MCNC: 1.48 MG/DL (ref 0.5–1.05)
EGFRCR SERPLBLD CKD-EPI 2021: 36 ML/MIN/1.73M*2
EOSINOPHIL # BLD AUTO: 0.35 X10*3/UL (ref 0–0.4)
EOSINOPHIL NFR BLD AUTO: 4.8 %
ERYTHROCYTE [DISTWIDTH] IN BLOOD BY AUTOMATED COUNT: 14.7 % (ref 11.5–14.5)
FERRITIN SERPL-MCNC: 65 NG/ML (ref 8–150)
GLUCOSE SERPL-MCNC: 92 MG/DL (ref 74–99)
HCT VFR BLD AUTO: 37.4 % (ref 36–46)
HGB BLD-MCNC: 12.1 G/DL (ref 12–16)
IMM GRANULOCYTES # BLD AUTO: 0.03 X10*3/UL (ref 0–0.5)
IMM GRANULOCYTES NFR BLD AUTO: 0.4 % (ref 0–0.9)
IRON SATN MFR SERPL: 16 % (ref 25–45)
IRON SERPL-MCNC: 65 UG/DL (ref 35–150)
LDH SERPL L TO P-CCNC: 244 U/L (ref 84–246)
LYMPHOCYTES # BLD AUTO: 1.13 X10*3/UL (ref 0.8–3)
LYMPHOCYTES NFR BLD AUTO: 15.5 %
MCH RBC QN AUTO: 30.3 PG (ref 26–34)
MCHC RBC AUTO-ENTMCNC: 32.4 G/DL (ref 32–36)
MCV RBC AUTO: 94 FL (ref 80–100)
MONOCYTES # BLD AUTO: 0.44 X10*3/UL (ref 0.05–0.8)
MONOCYTES NFR BLD AUTO: 6 %
NEUTROPHILS # BLD AUTO: 5.23 X10*3/UL (ref 1.6–5.5)
NEUTROPHILS NFR BLD AUTO: 71.9 %
NRBC BLD-RTO: 0 /100 WBCS (ref 0–0)
PLATELET # BLD AUTO: 346 X10*3/UL (ref 150–450)
POTASSIUM SERPL-SCNC: 5.1 MMOL/L (ref 3.5–5.3)
PROT SERPL-MCNC: 7 G/DL (ref 6.4–8.2)
RBC # BLD AUTO: 3.99 X10*6/UL (ref 4–5.2)
SODIUM SERPL-SCNC: 138 MMOL/L (ref 136–145)
TIBC SERPL-MCNC: 412 UG/DL (ref 240–445)
UIBC SERPL-MCNC: 347 UG/DL (ref 110–370)
WBC # BLD AUTO: 7.3 X10*3/UL (ref 4.4–11.3)

## 2025-01-31 PROCEDURE — 83540 ASSAY OF IRON: CPT

## 2025-01-31 PROCEDURE — 83615 LACTATE (LD) (LDH) ENZYME: CPT

## 2025-01-31 PROCEDURE — 82728 ASSAY OF FERRITIN: CPT

## 2025-01-31 PROCEDURE — 83550 IRON BINDING TEST: CPT

## 2025-01-31 PROCEDURE — 80053 COMPREHEN METABOLIC PANEL: CPT

## 2025-01-31 PROCEDURE — 85025 COMPLETE CBC W/AUTO DIFF WBC: CPT

## 2025-02-07 DIAGNOSIS — F41.1 GENERALIZED ANXIETY DISORDER: ICD-10-CM

## 2025-02-07 RX ORDER — BUSPIRONE HYDROCHLORIDE 5 MG/1
5 TABLET ORAL 2 TIMES DAILY
Qty: 180 TABLET | Refills: 1 | OUTPATIENT
Start: 2025-02-07

## 2025-02-07 NOTE — TELEPHONE ENCOUNTER
Patient called stating that she needs medication refill for     busPIRone (Buspar) 5 mg tablet  [facility]/pharmacy #[id number] - [address] AT [place] [id number]  Phone: [phone]  Fax: [phone]  MIKEY #: [id number]     Nov 4/23/25

## 2025-02-07 NOTE — TELEPHONE ENCOUNTER
Rx Refill Request     Name: Marlene LINDQUIST Antonio  :  1945     Date of last appointment:  2025   Date of next appointment:  2025   Best number to reach patient:  [phone]

## 2025-02-10 DIAGNOSIS — L29.9 PRURITUS OF SKIN: ICD-10-CM

## 2025-02-10 RX ORDER — HYDROXYZINE HYDROCHLORIDE 25 MG/1
25 TABLET, FILM COATED ORAL 3 TIMES DAILY
Qty: 60 TABLET | Refills: 1 | Status: SHIPPED | OUTPATIENT
Start: 2025-02-10

## 2025-02-10 NOTE — TELEPHONE ENCOUNTER
Rx Refill Request     Name: Marlene Alvarez  :  1945   Medication Name:  HYDROXYZINE 25MG  Specific Pharmacy location:  [facility]  Date of last appointment:  2025   Date of next appointment:  2025   Best number to reach patient:  [phone]

## 2025-02-19 ENCOUNTER — TELEPHONE (OUTPATIENT)
Dept: PRIMARY CARE | Facility: CLINIC | Age: 80
End: 2025-02-19
Payer: MEDICARE

## 2025-02-19 DIAGNOSIS — F51.04 CHRONIC INSOMNIA: ICD-10-CM

## 2025-02-19 RX ORDER — TRAZODONE HYDROCHLORIDE 100 MG/1
100 TABLET ORAL NIGHTLY PRN
Qty: 90 TABLET | Refills: 0 | Status: SHIPPED | OUTPATIENT
Start: 2025-02-19 | End: 2026-02-19

## 2025-02-19 NOTE — TELEPHONE ENCOUNTER
PATIENT IS NOT SLEEPING AT NIGHT FOR SEVERAL DAYS, REQUESTING SOMETHING FOR SLEEP  LOV 1/28/25  NEXT APPT 4/23/25  PLEASE ADVISE    [facility]

## 2025-02-19 NOTE — TELEPHONE ENCOUNTER
Per AP, If patient is taking the 75mg regularly patient may increase Trazodone to 100mg. Spoke with patient who stated she is taking one and a half pills to make the 75mg every night. Patient stated she will run out of medication if she has to take 2 pills and requested a new rx sent for 100mg. Medication pended for AP approval.

## 2025-02-27 ENCOUNTER — OFFICE VISIT (OUTPATIENT)
Dept: ORTHOPEDIC SURGERY | Facility: CLINIC | Age: 80
End: 2025-02-27
Payer: MEDICARE

## 2025-02-27 DIAGNOSIS — M17.12 ARTHRITIS OF KNEE, LEFT: Primary | ICD-10-CM

## 2025-02-27 PROCEDURE — 20610 DRAIN/INJ JOINT/BURSA W/O US: CPT | Mod: LT | Performed by: ORTHOPAEDIC SURGERY

## 2025-02-27 PROCEDURE — 99213 OFFICE O/P EST LOW 20 MIN: CPT | Mod: 25 | Performed by: ORTHOPAEDIC SURGERY

## 2025-02-27 PROCEDURE — 99213 OFFICE O/P EST LOW 20 MIN: CPT | Performed by: ORTHOPAEDIC SURGERY

## 2025-02-27 PROCEDURE — 2500000004 HC RX 250 GENERAL PHARMACY W/ HCPCS (ALT 636 FOR OP/ED): Performed by: ORTHOPAEDIC SURGERY

## 2025-02-27 PROCEDURE — 1123F ACP DISCUSS/DSCN MKR DOCD: CPT | Performed by: ORTHOPAEDIC SURGERY

## 2025-02-27 RX ORDER — LIDOCAINE HYDROCHLORIDE 10 MG/ML
5 INJECTION, SOLUTION INFILTRATION; PERINEURAL
Status: COMPLETED | OUTPATIENT
Start: 2025-02-27 | End: 2025-02-27

## 2025-02-27 RX ORDER — BETAMETHASONE SODIUM PHOSPHATE AND BETAMETHASONE ACETATE 3; 3 MG/ML; MG/ML
2 INJECTION, SUSPENSION INTRA-ARTICULAR; INTRALESIONAL; INTRAMUSCULAR; SOFT TISSUE
Status: COMPLETED | OUTPATIENT
Start: 2025-02-27 | End: 2025-02-27

## 2025-02-27 RX ADMIN — BETAMETHASONE SODIUM PHOSPHATE AND BETAMETHASONE ACETATE 2 ML: 3; 3 INJECTION, SUSPENSION INTRA-ARTICULAR; INTRALESIONAL; INTRAMUSCULAR at 16:19

## 2025-02-27 RX ADMIN — LIDOCAINE HYDROCHLORIDE 5 ML: 10 INJECTION, SOLUTION INFILTRATION; PERINEURAL at 16:19

## 2025-02-27 NOTE — PROGRESS NOTES
History of present illness: Patient with known knee arthritis last x-ray was 3/11/2024 so less than a-year-old    Physical exam:    General: No acute distress or breathing difficulty or discomfort, pleasant and cooperative with the examination.    Extremities: The affected left knee was examined and inspected and was tender to touch along the medial and lateral aspect with catching, locking or mechanical symptoms.    The skin was intact without breakdown or open wound.  Old incisions of present were healed.    There was a mild Shahla exam seen with some evidence of instability and weakness in the collateral ligaments with varus valgus stressing and laxity in the anterior or posterior planes.    There was a negative Lachman's test pivot shift test and posterior drawer sign with no foot drop, numbness or tingling.    Sensation, reflexes and pulses in the foot and ankle are preserved.  There was an effusion.  Range of motion showed good straight leg raise with flexion to 150 degrees and extension to 0 degrees.  The patient had the ability to bear weight but with discomfort.  The patient's gait was antalgic secondary to discomfort.    Before aspiration injection the risks of a cortisone injection including infection, local skin irritation, skin atrophy, calcification, continued pain and discomfort, elevated blood sugar, burning, failure to relieve pain, possible late infection were discussed with the patient.    Postprocedure discomfort can be alleviated with additional medications, ice, elevation, rest over the first 24 hours as recommended.    Patient verbalized understanding and wanted to proceed with the planned procedure.    After informed consent was provided and allergies verified, the patient was positioned appropriately on the bed.  The left knee to be aspirated and injected was prepped and draped in a sterile fashion.  The skin was anesthetized with ethyl chloride spray.  A joint aspiration was to be  performed an 18-gauge needle was used otherwise a 22-gauge needle was used to inject the appropriate joint.    Joint injection was performed with a mixture of 5 cc 1% lidocaine plain and 2 cc Celestone Soluspan 6 mg per mL.  The needle was removed and the puncture site closed and sealed with a Band-Aid.  The patient tolerated the procedure well.          Diagnostic studies: No new x-rays needed    Impression: Left knee osteoarthritis    Plan: Cortisone injection for Relief    Range of motion weight loss program    I will see her back for Gelsyn-3 injections when she returns sometime later in March 2025     L Inj/Asp: L knee on 2/27/2025 4:19 PM  Indications: pain and diagnostic evaluation  Details: 22 G needle, medial approach  Medications: 2 mL betamethasone acet,sod phos 6 mg/mL; 5 mL lidocaine 10 mg/mL (1 %)  Outcome: tolerated well, no immediate complications  Procedure, treatment alternatives, risks and benefits explained, specific risks discussed. Consent was given by the patient. Immediately prior to procedure a time out was called to verify the correct patient, procedure, equipment, support staff and site/side marked as required. Patient was prepped and draped in the usual sterile fashion.

## 2025-03-03 ENCOUNTER — APPOINTMENT (OUTPATIENT)
Dept: NEUROLOGY | Facility: CLINIC | Age: 80
End: 2025-03-03
Payer: MEDICARE

## 2025-03-03 VITALS
SYSTOLIC BLOOD PRESSURE: 151 MMHG | BODY MASS INDEX: 35.08 KG/M2 | HEIGHT: 63 IN | WEIGHT: 198 LBS | DIASTOLIC BLOOD PRESSURE: 82 MMHG | HEART RATE: 84 BPM

## 2025-03-03 DIAGNOSIS — R41.3 MEMORY IMPAIRMENT: Primary | ICD-10-CM

## 2025-03-03 PROCEDURE — 3079F DIAST BP 80-89 MM HG: CPT | Performed by: STUDENT IN AN ORGANIZED HEALTH CARE EDUCATION/TRAINING PROGRAM

## 2025-03-03 PROCEDURE — 3077F SYST BP >= 140 MM HG: CPT | Performed by: STUDENT IN AN ORGANIZED HEALTH CARE EDUCATION/TRAINING PROGRAM

## 2025-03-03 PROCEDURE — 99204 OFFICE O/P NEW MOD 45 MIN: CPT | Performed by: STUDENT IN AN ORGANIZED HEALTH CARE EDUCATION/TRAINING PROGRAM

## 2025-03-03 PROCEDURE — 1159F MED LIST DOCD IN RCRD: CPT | Performed by: STUDENT IN AN ORGANIZED HEALTH CARE EDUCATION/TRAINING PROGRAM

## 2025-03-03 PROCEDURE — 1123F ACP DISCUSS/DSCN MKR DOCD: CPT | Performed by: STUDENT IN AN ORGANIZED HEALTH CARE EDUCATION/TRAINING PROGRAM

## 2025-03-03 ASSESSMENT — PATIENT HEALTH QUESTIONNAIRE - PHQ9
2. FEELING DOWN, DEPRESSED OR HOPELESS: NOT AT ALL
SUM OF ALL RESPONSES TO PHQ9 QUESTIONS 1 & 2: 0
1. LITTLE INTEREST OR PLEASURE IN DOING THINGS: NOT AT ALL

## 2025-03-03 NOTE — PROGRESS NOTES
"   [facility]   Referring: Sheldon Up MD  PCP: Sheldon Up MD  Date of service: 3/3/25    Chief Complaint   Patient presents with    Memory Loss     NPV, pt c/o having to concentrate harder to remember basic things for the last few months.     HISTORY OF PRESENT ILLNESS     Subjective     Marlene Alvarez is a 79 y.o. female who presents for initial evaluation  of memory impairment. Her past medical history is significant for hypothyroidism, CKD, HTN, HLD, depression/anxiety, hearing-aid user (over 30+ years) and insomnia.     Marlene first started to notice issues with her memory a few weeks ago. In particular, she is noticing that it is harder for her to remember things she has to do so she now has to really concentrate \"to make sure I keep things in my head.\" She already has been using multiple calendars for years as a way to write down what she has to do in order not to forget. She is not forgetting familiar people or getting lost in familar places. She denies any visual/auditory hallucinations, tremors, changes in her gait or recent falls. Her chronic anxiety and depression is fairly well controlled on paxil. Years ago she did seem to have issues with insomnia but since she has been on trazedone, her sleeping has somewhat improved but she still continues to have disjointed sleep, only sleeping a few hours at a time. She does not know if she snores.     Marlene is fully independent of her IADLs/ADLs as well as actively drives local distances. She has used a weekly pill box for sometime to ensure that she never misses a medication. She does not need lists at the grocery store to remember what she needs. She also fully takes care of her own home (and her cats) as her  lives in a nursing home nearby. At one point after she retired she was frauded out of several thousands of dollars from a [employer] fund from her work but she believes this was an \"inside job\" as she never was " contacted by someone asking for information regarding that account before learning it was stolen.     She denies history of prior stroke or seizures. Remainder of neurology ROS negative.     Of note, her most recent TSH was very elevated (33.24) with no prior history of values in that range. Her PCP is closely monitoring this.     SHx: remote tobacco use (quit over 50 years ago). Denies alchol or marijuana use    FHx: no relavent neurological family history      Patient Active Problem List   Diagnosis    Acquired hypothyroidism    Alternating constipation and diarrhea    Arcus senilis, bilateral    Chronic anemia    Chronic constipation with overflow    Chronic diarrhea    Stage 3b chronic kidney disease (Multi)    Chronic right shoulder pain    Cornea guttata    Essential hypertension    Gastro-esophageal reflux disease without esophagitis    Glaucoma    Hip bursitis    History of colonic polyps    Knee osteoarthritis    Mixed hyperlipidemia    Moderate episode of recurrent major depressive disorder    Mixed incontinence    Nuclear sclerotic cataract of both eyes    Cataracts, bilateral    Spurious diarrhea    Trochanteric bursitis of right hip    Aortic valve stenosis    Routine general medical examination at health care facility    Hyperkalemia    Chronic fatigue    Obstructive sleep apnea    Pruritus of skin    Keratoconjunctivitis sicca    Leg cramps    Acute bronchitis, unspecified    Chronic allergic rhinitis    Generalized anxiety disorder    Nocturia    Chronic insomnia    Memory impairment     Past Medical History:   Diagnosis Date    Anxiety disorder, unspecified     Anxiety and depression    Personal history of other diseases of the circulatory system     History of hypertension    Personal history of other diseases of the nervous system and sense organs     History of sleep apnea    Personal history of other endocrine, nutritional and metabolic disease     History of hypercholesterolemia    Personal  history of other endocrine, nutritional and metabolic disease     History of hypothyroidism    Personal history of other mental and behavioral disorders     History of depression     Past Surgical History:   Procedure Laterality Date    CHOLECYSTECTOMY  2019    Cholecystectomy    HYSTERECTOMY  2019    Hysterectomy    OTHER SURGICAL HISTORY  2022    Colonoscopy    TOTAL KNEE ARTHROPLASTY  2015    Knee Replacement     Social History     Tobacco Use    Smoking status: Former     Current packs/day: 0.00     Average packs/day: 1 pack/day for 2.5 years (2.5 ttl pk-yrs)     Types: Cigarettes     Start date: 1967     Quit date: 1970     Years since quittin.2    Smokeless tobacco: Never   Substance Use Topics    Alcohol use: Never     family history includes Breast cancer in her daughter and father's sister; Prostate cancer in her father; bladder cancer in her mother.    Current Outpatient Medications   Medication Instructions    busPIRone (BUSPAR) 5 mg, oral, 2 times daily    chlorthalidone (HYGROTON) 25 mg, oral, Daily    cholecalciferol (Vitamin D-3) 25 MCG (1000 UT) tablet 1 tablet, Daily    famotidine (PEPCID) 20 mg, oral, 2 times daily    ferrous sulfate 325 (65 Fe) MG tablet 1 tablet, 2 times daily    hydrOXYzine HCL (ATARAX) 25 mg, oral, 3 times daily    levothyroxine (SYNTHROID, LEVOXYL) 150 mcg, oral, Every morning    lisinopril 10 mg, oral, Daily    lovastatin (MEVACOR) 40 mg, oral, Nightly    mirabegron (MYRBETRIQ) 50 mg, oral, Daily    multivitamin tablet 1 tablet, Daily    PARoxetine (PAXIL) 20 mg, oral, Every morning    tamsulosin (FLOMAX) 0.4 mg, oral, Daily    traZODone (DESYREL) 100 mg, oral, Nightly PRN     Allergies   Allergen Reactions    Nitrofurantoin Itching    Nitrofurantoin Monohyd/M-Cryst Other     flu-like symptoms    Sulfamethoxazole-Trimethoprim Other     flu-like symptoms    Moxifloxacin Rash       PHYSICAL EXAM     Objective   Neurological Exam  Physical  Exam    Neurologic Exam:    Mental Status:   Memory: 3/3 immediate recall, 2/3 delayed recall (able to name last object with category given)  Attention/Concentration: 5/5 serial 7s  Fund of knowledge: intact   Orientation: oriented to date location and person   Language: normal fluency comprehension repetition and naming   Speech: is fluent, no dysarthria    MMSE 29/30  -1 delayed recall (but able to remember object with category given)    Cranial Nerves:  Pupils: OD 4 mm to 3 mm; OS 4 mm to 3 mm (no RAPD)  Visual Fields: (R) visual field full to confrontation; (L) visual field full to confrontation  CN III, CN IV, CN VI (Extraocular movements): extraocular eye movements intact.    CN V:  normal sensation in all three divisions of the trigeminal nerves, bilaterally.  CN VII: normal facial muscle strength bilaterally  CN VIII: auditory acuity intact to bedside testing  CN IX/CN X: normal palate elevation  CN XI: normal strength of trapezius and SCM muscles, bilaterally.  CN XII: tongue protrudes in the midline, no atrophy or fasciculations    Motor Exam:   Muscle Bulk: No atrophy or fasciculations   Muscle Tone: physiologic tone in upper and lower extremities   Involuntary movements: none  Pronator drift: absent   Strength:  [id number]     [id number]     [id number]    Sensory:   Light touch:intact in all 4 extremities.   Vibration:intact in all 4 extremities.     Romberg:   Intact without sway.    Reflexes:   RL  B2+2+   T  2+2+  BR 2+2+   Ptracetrace   Atracetrace  ToesDownDown    Coordination:   Normal: Rapid alternating movements are performed with normal speed, amplitude and rhythm; finger to nose and heel-knee-shin movements performed accurately and without dysmetria.     Gait:   Slightly wide based, antalgic gait. No shuffling    RESULTS   Data reviewed:  No MRI head results found for the past 12 months    Serum 1/2025 - CMP wnl except for Cr 1.48, GFR 36, AST 40. TSH 34.05 (prior 0.89 in  10/2024)  Serum 7/2024 - B12 648    IMPRESSION AND PLAN   Marlene Alvarez is a 80 yo woman with PMHx of hypothyroidism, CKD, HTN, HLD, depression/anxiety, hearing-aid user (over 30+ years) and insomnia who presents with mild forgetfulness over the last month or so. She otherwise remains fully independent in IADLs/ADLs. MMSE 29/30 on today's encounter, otherwise reassuring neuro exam. No red flags noted in history. No parkinsonian features on exam. Differential remains broad but suspicion raised for mild memory concerns from secondary factors, such as her known depression/anxiety, chronic insomnia and recently elevated TSH. Lower suspicion for primary neurodegenerative process but will need to follow longitudinally as well as obtain brain imaging to further evaluate. Other etiologies not yet excluded.     Plan:  - MRI brain w/wo  - serum studies as noted below  - patient prefers to hold off on neuropsych memory testing for now  - patient understands and agrees to plan  - RTC 4 months       Diagnoses and all orders for this visit:  Memory impairment  -     Referral to Neurology  -     MR brain wo IV contrast; Future  -     Vitamin B1, Whole Blood; Future  -     Follow Up In Neurology; Future        Patient Instructions   I have ordered the MRI of your brain.    Please get labwork completed before you see Dr. Up again.    We will follow up in 3-4 months.     Radha Below, DO  Neurology  [facility]    I personally spent 45 minutes on the day of the visit completing the review of the medical record and outside records, obtaining history and performing an appropriate physical exam, patient care, counseling and education, placing orders, independently reviewing results, communicating with the patient/family and other providers, coordinating care and performing appropriate clinical documentation.

## 2025-03-03 NOTE — PATIENT INSTRUCTIONS
I have ordered the MRI of your brain.    Please get labwork completed before you see Dr. Up again.    We will follow up in 3-4 months.

## 2025-03-06 ENCOUNTER — HOSPITAL ENCOUNTER (OUTPATIENT)
Dept: RADIOLOGY | Facility: CLINIC | Age: 80
Discharge: HOME | End: 2025-03-06
Payer: MEDICARE

## 2025-03-06 ENCOUNTER — OFFICE VISIT (OUTPATIENT)
Dept: ORTHOPEDIC SURGERY | Facility: CLINIC | Age: 80
End: 2025-03-06
Payer: MEDICARE

## 2025-03-06 DIAGNOSIS — M79.645 FINGER PAIN, LEFT: ICD-10-CM

## 2025-03-06 DIAGNOSIS — M19.049 HAND ARTHRITIS: Primary | ICD-10-CM

## 2025-03-06 PROCEDURE — 2500000004 HC RX 250 GENERAL PHARMACY W/ HCPCS (ALT 636 FOR OP/ED): Performed by: ORTHOPAEDIC SURGERY

## 2025-03-06 PROCEDURE — 20600 DRAIN/INJ JOINT/BURSA W/O US: CPT | Mod: LT | Performed by: ORTHOPAEDIC SURGERY

## 2025-03-06 PROCEDURE — 99214 OFFICE O/P EST MOD 30 MIN: CPT | Mod: 25 | Performed by: ORTHOPAEDIC SURGERY

## 2025-03-06 PROCEDURE — 73140 X-RAY EXAM OF FINGER(S): CPT | Mod: LT

## 2025-03-06 RX ORDER — LIDOCAINE HYDROCHLORIDE 10 MG/ML
0.5 INJECTION, SOLUTION INFILTRATION; PERINEURAL
Status: COMPLETED | OUTPATIENT
Start: 2025-03-06 | End: 2025-03-06

## 2025-03-06 RX ADMIN — LIDOCAINE HYDROCHLORIDE 0.5 ML: 10 INJECTION, SOLUTION INFILTRATION; PERINEURAL at 11:34

## 2025-03-06 RX ADMIN — TRIAMCINOLONE ACETONIDE 5 MG: 10 INJECTION, SUSPENSION INTRA-ARTICULAR; INTRALESIONAL at 11:34

## 2025-03-06 NOTE — PROGRESS NOTES
History present illness: Patient presents today for evaluation of painful left small finger.  No discrete injury.  Localizes to distal interphalangeal joint.      Past medical history: The patient's past medical history, family history, social history, and review of systems were documented on the patient medical intake.  The updated data was reviewed in the electronic medical record.  History is negative except otherwise stated in history of present illness.        Physical examination:  General: Alert and oriented to person, place, and time.  No acute distress and breathing comfortably: Pleasant and cooperative with examination.  HEENT: Head is normocephalic and atraumatic.  Neck: Supple, no visible swelling.  Cardiovascular: No palpable tachycardia  Lungs: No audible wheezing or labored breathing  Abdomen: Nondistended.  Extremities: Evaluation of the left upper extremity finds the patient had palpable radial artery at the wrist with brisk capillary refill to all digits.  Patient has intact sensation to axillary radial median and ulnar nerves.  There are no open wounds.  There are no signs of infection.  There is no evidence of lymphedema or lymphatic streaking.  The patient has supple compartments to left arm forearm and hand.  Focal tenderness to left small finger at the distal interphalangeal joint.  Mild flexion posture.      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  We talked about operative and nonoperative strategies.  Recommendations were made for trial of left small finger distal interphalangeal joint injection.  Follow-up with me in 6 weeks.  No x-rays upon return.  Patient is agreeable with this strategy.        Procedure:  Hand / UE Inj/Asp: L small DIP for osteoarthritis on 3/6/2025 11:34 AM  Indications: pain  Details: 25 G needle, dorsal  approach  Medications: 5 mg triamcinolone acetonide 10 mg/mL; 0.5 mL lidocaine 10 mg/mL (1 %)  Outcome: tolerated well, no immediate complications  Procedure, treatment alternatives, risks and benefits explained, specific risks discussed. Consent was given by the patient. Immediately prior to procedure a time out was called to verify the correct patient, procedure, equipment, support staff and site/side marked as required. Patient was prepped and draped in the usual sterile fashion.

## 2025-03-25 ENCOUNTER — HOSPITAL ENCOUNTER (OUTPATIENT)
Dept: RADIOLOGY | Facility: HOSPITAL | Age: 80
Discharge: HOME | End: 2025-03-25
Payer: MEDICARE

## 2025-03-25 DIAGNOSIS — R41.3 MEMORY IMPAIRMENT: ICD-10-CM

## 2025-03-25 PROCEDURE — 70551 MRI BRAIN STEM W/O DYE: CPT

## 2025-04-03 ENCOUNTER — OFFICE VISIT (OUTPATIENT)
Dept: ORTHOPEDIC SURGERY | Facility: CLINIC | Age: 80
End: 2025-04-03
Payer: MEDICARE

## 2025-04-03 ENCOUNTER — APPOINTMENT (OUTPATIENT)
Dept: ORTHOPEDIC SURGERY | Facility: CLINIC | Age: 80
End: 2025-04-03
Payer: MEDICARE

## 2025-04-03 DIAGNOSIS — M17.12 ARTHRITIS OF KNEE, LEFT: Primary | ICD-10-CM

## 2025-04-03 PROCEDURE — 2500000004 HC RX 250 GENERAL PHARMACY W/ HCPCS (ALT 636 FOR OP/ED): Performed by: ORTHOPAEDIC SURGERY

## 2025-04-03 PROCEDURE — 20610 DRAIN/INJ JOINT/BURSA W/O US: CPT | Mod: LT | Performed by: ORTHOPAEDIC SURGERY

## 2025-04-03 PROCEDURE — 99211 OFF/OP EST MAY X REQ PHY/QHP: CPT | Mod: 25 | Performed by: ORTHOPAEDIC SURGERY

## 2025-04-03 PROCEDURE — 1123F ACP DISCUSS/DSCN MKR DOCD: CPT | Performed by: ORTHOPAEDIC SURGERY

## 2025-04-03 RX ADMIN — Medication 2 ML: at 15:24

## 2025-04-03 NOTE — PROGRESS NOTES
Before the left injection the benefits of a hyaluronic acid  injection including infection, local skin irritation, skin atrophy, calcification, continued pain and discomfort, elevated blood sugar, burning, failure to relieve pain, possible late infection were discussed with the patient.    Postprocedure discomfort can be alleviated with additional medications, ice, elevation, rest over the first 24 hours as recommended.    Patient verbalized understanding and wanted to proceed with the planned procedure.    After informed consent was provided and allergies verified, the patient was positioned appropriately on the bed.  The LEFT KNEE to be injected was prepped and draped in a sterile fashion.  The skin was anesthetized with ethyl chloride spray.   A  22-gauge needle was used to inject the appropriate joint.    Joint injection was performed with Gelsyn 3 contains 16.8 mg of hyaluronic acid per 2 mL was performed.  The needle was removed and the puncture site closed and sealed with a Band-Aid.  The patient tolerated the procedure well.  L Inj/Asp: L knee on 4/3/2025 3:24 PM  Indications: pain  Details: 22 G needle, medial approach  Medications: 2 mL sodium hyaluronate 16.8 mg/2 mL  Outcome: tolerated well, no immediate complications  Procedure, treatment alternatives, risks and benefits explained, specific risks discussed. Consent was given by the patient. Immediately prior to procedure a time out was called to verify the correct patient, procedure, equipment, support staff and site/side marked as required. Patient was prepped and draped in the usual sterile fashion.

## 2025-04-10 ENCOUNTER — OFFICE VISIT (OUTPATIENT)
Dept: ORTHOPEDIC SURGERY | Facility: CLINIC | Age: 80
End: 2025-04-10
Payer: MEDICARE

## 2025-04-10 DIAGNOSIS — M17.12 ARTHRITIS OF KNEE, LEFT: Primary | ICD-10-CM

## 2025-04-10 PROCEDURE — 20610 DRAIN/INJ JOINT/BURSA W/O US: CPT | Mod: LT | Performed by: ORTHOPAEDIC SURGERY

## 2025-04-10 PROCEDURE — 2500000004 HC RX 250 GENERAL PHARMACY W/ HCPCS (ALT 636 FOR OP/ED): Performed by: ORTHOPAEDIC SURGERY

## 2025-04-10 PROCEDURE — 1123F ACP DISCUSS/DSCN MKR DOCD: CPT | Performed by: ORTHOPAEDIC SURGERY

## 2025-04-10 PROCEDURE — 99211 OFF/OP EST MAY X REQ PHY/QHP: CPT | Mod: 25 | Performed by: ORTHOPAEDIC SURGERY

## 2025-04-10 RX ADMIN — Medication 2 ML: at 15:46

## 2025-04-10 NOTE — PROGRESS NOTES
Before the left injection the benefits of a hyaluronic acid  injection including infection, local skin irritation, skin atrophy, calcification, continued pain and discomfort, elevated blood sugar, burning, failure to relieve pain, possible late infection were discussed with the patient.    Postprocedure discomfort can be alleviated with additional medications, ice, elevation, rest over the first 24 hours as recommended.    Patient verbalized understanding and wanted to proceed with the planned procedure.    After informed consent was provided and allergies verified, the patient was positioned appropriately on the bed.  The LEFT KNEE to be injected was prepped and draped in a sterile fashion.  The skin was anesthetized with ethyl chloride spray.   A  22-gauge needle was used to inject the appropriate joint.    Joint injection was performed with Gelsyn 3 contains 16.8 mg of hyaluronic acid per 2 mL was performed.  The needle was removed and the puncture site closed and sealed with a Band-Aid.  The patient tolerated the procedure well.  L Inj/Asp: L knee on 4/10/2025 3:46 PM  Indications: pain  Details: 22 G needle, medial approach  Medications: 2 mL sodium hyaluronate 16.8 mg/2 mL  Outcome: tolerated well, no immediate complications  Procedure, treatment alternatives, risks and benefits explained, specific risks discussed. Consent was given by the patient. Immediately prior to procedure a time out was called to verify the correct patient, procedure, equipment, support staff and site/side marked as required. Patient was prepped and draped in the usual sterile fashion.

## 2025-04-16 ENCOUNTER — APPOINTMENT (OUTPATIENT)
Dept: ORTHOPEDIC SURGERY | Facility: CLINIC | Age: 80
End: 2025-04-16
Payer: MEDICARE

## 2025-04-17 ENCOUNTER — APPOINTMENT (OUTPATIENT)
Dept: ORTHOPEDIC SURGERY | Facility: CLINIC | Age: 80
End: 2025-04-17
Payer: MEDICARE

## 2025-04-23 ENCOUNTER — APPOINTMENT (OUTPATIENT)
Dept: PRIMARY CARE | Facility: CLINIC | Age: 80
End: 2025-04-23
Payer: MEDICARE

## 2025-04-24 ENCOUNTER — APPOINTMENT (OUTPATIENT)
Dept: ORTHOPEDIC SURGERY | Facility: CLINIC | Age: 80
End: 2025-04-24
Payer: MEDICARE

## 2025-04-24 DIAGNOSIS — M17.12 ARTHRITIS OF KNEE, LEFT: Primary | ICD-10-CM

## 2025-04-24 LAB
ALBUMIN SERPL-MCNC: 4.4 G/DL (ref 3.6–5.1)
ALP SERPL-CCNC: 52 U/L (ref 37–153)
ALT SERPL-CCNC: 24 U/L (ref 6–29)
ANION GAP SERPL CALCULATED.4IONS-SCNC: 8 MMOL/L (CALC) (ref 7–17)
AST SERPL-CCNC: 39 U/L (ref 10–35)
BASOPHILS # BLD AUTO: 92 CELLS/UL (ref 0–200)
BASOPHILS NFR BLD AUTO: 1.7 %
BILIRUB SERPL-MCNC: 0.5 MG/DL (ref 0.2–1.2)
BUN SERPL-MCNC: 35 MG/DL (ref 7–25)
CALCIUM SERPL-MCNC: 10.2 MG/DL (ref 8.6–10.4)
CHLORIDE SERPL-SCNC: 101 MMOL/L (ref 98–110)
CHOLEST SERPL-MCNC: 221 MG/DL
CHOLEST/HDLC SERPL: 2.1 (CALC)
CO2 SERPL-SCNC: 27 MMOL/L (ref 20–32)
CREAT SERPL-MCNC: 1.31 MG/DL (ref 0.6–1)
EGFRCR SERPLBLD CKD-EPI 2021: 42 ML/MIN/1.73M2
EOSINOPHIL # BLD AUTO: 373 CELLS/UL (ref 15–500)
EOSINOPHIL NFR BLD AUTO: 6.9 %
ERYTHROCYTE [DISTWIDTH] IN BLOOD BY AUTOMATED COUNT: 12.6 % (ref 11–15)
FOLATE SERPL-MCNC: 19.3 NG/ML
GLUCOSE SERPL-MCNC: 88 MG/DL (ref 65–99)
HCT VFR BLD AUTO: 37.8 % (ref 35–45)
HDLC SERPL-MCNC: 105 MG/DL
HGB BLD-MCNC: 12.5 G/DL (ref 11.7–15.5)
LDLC SERPL CALC-MCNC: 98 MG/DL (CALC)
LYMPHOCYTES # BLD AUTO: 1685 CELLS/UL (ref 850–3900)
LYMPHOCYTES NFR BLD AUTO: 31.2 %
MCH RBC QN AUTO: 31.3 PG (ref 27–33)
MCHC RBC AUTO-ENTMCNC: 33.1 G/DL (ref 32–36)
MCV RBC AUTO: 94.5 FL (ref 80–100)
MONOCYTES # BLD AUTO: 383 CELLS/UL (ref 200–950)
MONOCYTES NFR BLD AUTO: 7.1 %
NEUTROPHILS # BLD AUTO: 2867 CELLS/UL (ref 1500–7800)
NEUTROPHILS NFR BLD AUTO: 53.1 %
NONHDLC SERPL-MCNC: 116 MG/DL (CALC)
PLATELET # BLD AUTO: 345 THOUSAND/UL (ref 140–400)
PMV BLD REES-ECKER: 9.7 FL (ref 7.5–12.5)
POTASSIUM SERPL-SCNC: 4.8 MMOL/L (ref 3.5–5.3)
PROT SERPL-MCNC: 7.1 G/DL (ref 6.1–8.1)
RBC # BLD AUTO: 4 MILLION/UL (ref 3.8–5.1)
SODIUM SERPL-SCNC: 136 MMOL/L (ref 135–146)
T4 FREE SERPL-MCNC: 0.8 NG/DL (ref 0.8–1.8)
TRIGL SERPL-MCNC: 89 MG/DL
TSH SERPL-ACNC: 41.89 MIU/L (ref 0.4–4.5)
VIT B12 SERPL-MCNC: 482 PG/ML (ref 200–1100)
WBC # BLD AUTO: 5.4 THOUSAND/UL (ref 3.8–10.8)

## 2025-04-24 PROCEDURE — 1123F ACP DISCUSS/DSCN MKR DOCD: CPT | Performed by: ORTHOPAEDIC SURGERY

## 2025-04-24 PROCEDURE — 20610 DRAIN/INJ JOINT/BURSA W/O US: CPT | Mod: LT | Performed by: ORTHOPAEDIC SURGERY

## 2025-04-24 PROCEDURE — 2500000004 HC RX 250 GENERAL PHARMACY W/ HCPCS (ALT 636 FOR OP/ED): Performed by: ORTHOPAEDIC SURGERY

## 2025-04-24 PROCEDURE — 99211 OFF/OP EST MAY X REQ PHY/QHP: CPT | Mod: 25 | Performed by: ORTHOPAEDIC SURGERY

## 2025-04-24 RX ORDER — BETAMETHASONE SODIUM PHOSPHATE AND BETAMETHASONE ACETATE 3; 3 MG/ML; MG/ML
2 INJECTION, SUSPENSION INTRA-ARTICULAR; INTRALESIONAL; INTRAMUSCULAR; SOFT TISSUE
Status: COMPLETED | OUTPATIENT
Start: 2025-04-24 | End: 2025-04-24

## 2025-04-24 RX ORDER — LIDOCAINE HYDROCHLORIDE 10 MG/ML
5 INJECTION, SOLUTION INFILTRATION; PERINEURAL
Status: COMPLETED | OUTPATIENT
Start: 2025-04-24 | End: 2025-04-24

## 2025-04-24 RX ADMIN — BETAMETHASONE SODIUM PHOSPHATE AND BETAMETHASONE ACETATE 2 ML: 3; 3 INJECTION, SUSPENSION INTRA-ARTICULAR; INTRALESIONAL; INTRAMUSCULAR at 10:29

## 2025-04-24 RX ADMIN — LIDOCAINE HYDROCHLORIDE 5 ML: 10 INJECTION, SOLUTION INFILTRATION; PERINEURAL at 10:29

## 2025-04-24 NOTE — PROGRESS NOTES
Before the left injection the benefits of a hyaluronic acid  injection including infection, local skin irritation, skin atrophy, calcification, continued pain and discomfort, elevated blood sugar, burning, failure to relieve pain, possible late infection were discussed with the patient.    Postprocedure discomfort can be alleviated with additional medications, ice, elevation, rest over the first 24 hours as recommended.    Patient verbalized understanding and wanted to proceed with the planned procedure.    After informed consent was provided and allergies verified, the patient was positioned appropriately on the bed.  The LEFT KNEE to be injected was prepped and draped in a sterile fashion.  The skin was anesthetized with ethyl chloride spray.   A  22-gauge needle was used to inject the appropriate joint.    Joint injection was performed with Gelsyn 3 contains 16.8 mg of hyaluronic acid per 2 mL was performed.  The needle was removed and the puncture site closed and sealed with a Band-Aid.  The patient tolerated the procedure well.    L Inj/Asp: L knee on 4/24/2025 10:29 AM  Indications: pain and diagnostic evaluation  Details: 22 G needle, medial approach  Medications: 2 mL betamethasone acet,sod phos 6 mg/mL; 5 mL lidocaine 10 mg/mL (1 %)  Outcome: tolerated well, no immediate complications  Procedure, treatment alternatives, risks and benefits explained, specific risks discussed. Consent was given by the patient. Immediately prior to procedure a time out was called to verify the correct patient, procedure, equipment, support staff and site/side marked as required. Patient was prepped and draped in the usual sterile fashion.

## 2025-04-25 DIAGNOSIS — F33.1 MODERATE EPISODE OF RECURRENT MAJOR DEPRESSIVE DISORDER: ICD-10-CM

## 2025-04-25 DIAGNOSIS — F41.1 GENERALIZED ANXIETY DISORDER: ICD-10-CM

## 2025-04-25 RX ORDER — PAROXETINE HYDROCHLORIDE 20 MG/1
20 TABLET, FILM COATED ORAL EVERY MORNING
Qty: 90 TABLET | Refills: 0 | OUTPATIENT
Start: 2025-04-25

## 2025-04-25 RX ORDER — PAROXETINE HYDROCHLORIDE 20 MG/1
20 TABLET, FILM COATED ORAL EVERY MORNING
Qty: 90 TABLET | Refills: 0 | Status: SHIPPED | OUTPATIENT
Start: 2025-04-25

## 2025-04-28 DIAGNOSIS — E03.9 ACQUIRED HYPOTHYROIDISM: ICD-10-CM

## 2025-04-28 DIAGNOSIS — E78.2 MIXED HYPERLIPIDEMIA: ICD-10-CM

## 2025-04-28 DIAGNOSIS — I10 ESSENTIAL HYPERTENSION: ICD-10-CM

## 2025-04-28 DIAGNOSIS — D64.9 CHRONIC ANEMIA: ICD-10-CM

## 2025-04-28 DIAGNOSIS — R41.3 MEMORY IMPAIRMENT: ICD-10-CM

## 2025-04-28 DIAGNOSIS — N18.32 STAGE 3B CHRONIC KIDNEY DISEASE (MULTI): ICD-10-CM

## 2025-05-10 DIAGNOSIS — K21.9 GASTRO-ESOPHAGEAL REFLUX DISEASE WITHOUT ESOPHAGITIS: ICD-10-CM

## 2025-05-10 RX ORDER — FAMOTIDINE 20 MG/1
20 TABLET, FILM COATED ORAL 2 TIMES DAILY
Qty: 180 TABLET | Refills: 0 | Status: SHIPPED | OUTPATIENT
Start: 2025-05-10

## 2025-05-12 ENCOUNTER — APPOINTMENT (OUTPATIENT)
Dept: PRIMARY CARE | Facility: CLINIC | Age: 80
End: 2025-05-12
Payer: MEDICARE

## 2025-05-12 VITALS
TEMPERATURE: 97.5 F | HEIGHT: 63 IN | SYSTOLIC BLOOD PRESSURE: 126 MMHG | DIASTOLIC BLOOD PRESSURE: 70 MMHG | HEART RATE: 70 BPM | WEIGHT: 183 LBS | BODY MASS INDEX: 32.43 KG/M2 | RESPIRATION RATE: 17 BRPM | OXYGEN SATURATION: 98 %

## 2025-05-12 DIAGNOSIS — E78.2 MIXED HYPERLIPIDEMIA: ICD-10-CM

## 2025-05-12 DIAGNOSIS — E03.9 ACQUIRED HYPOTHYROIDISM: ICD-10-CM

## 2025-05-12 DIAGNOSIS — F41.1 GENERALIZED ANXIETY DISORDER: ICD-10-CM

## 2025-05-12 DIAGNOSIS — N18.32 STAGE 3B CHRONIC KIDNEY DISEASE (MULTI): ICD-10-CM

## 2025-05-12 DIAGNOSIS — L98.9 SCALP LESION: ICD-10-CM

## 2025-05-12 DIAGNOSIS — I10 ESSENTIAL HYPERTENSION: Primary | ICD-10-CM

## 2025-05-12 DIAGNOSIS — D64.9 CHRONIC ANEMIA: ICD-10-CM

## 2025-05-12 DIAGNOSIS — F51.04 CHRONIC INSOMNIA: ICD-10-CM

## 2025-05-12 DIAGNOSIS — F33.1 MODERATE EPISODE OF RECURRENT MAJOR DEPRESSIVE DISORDER: ICD-10-CM

## 2025-05-12 PROCEDURE — G2211 COMPLEX E/M VISIT ADD ON: HCPCS | Performed by: FAMILY MEDICINE

## 2025-05-12 PROCEDURE — 99214 OFFICE O/P EST MOD 30 MIN: CPT | Performed by: FAMILY MEDICINE

## 2025-05-12 PROCEDURE — 1159F MED LIST DOCD IN RCRD: CPT | Performed by: FAMILY MEDICINE

## 2025-05-12 PROCEDURE — 3074F SYST BP LT 130 MM HG: CPT | Performed by: FAMILY MEDICINE

## 2025-05-12 PROCEDURE — 1160F RVW MEDS BY RX/DR IN RCRD: CPT | Performed by: FAMILY MEDICINE

## 2025-05-12 PROCEDURE — 3078F DIAST BP <80 MM HG: CPT | Performed by: FAMILY MEDICINE

## 2025-05-12 PROCEDURE — 1036F TOBACCO NON-USER: CPT | Performed by: FAMILY MEDICINE

## 2025-05-12 RX ORDER — CHLORTHALIDONE 25 MG/1
25 TABLET ORAL DAILY
Qty: 90 TABLET | Refills: 1 | Status: SHIPPED | OUTPATIENT
Start: 2025-05-12

## 2025-05-12 RX ORDER — LISINOPRIL 10 MG/1
10 TABLET ORAL DAILY
Qty: 90 TABLET | Refills: 1 | Status: SHIPPED | OUTPATIENT
Start: 2025-05-12

## 2025-05-12 RX ORDER — LOVASTATIN 40 MG/1
40 TABLET ORAL NIGHTLY
Qty: 90 TABLET | Refills: 1 | Status: SHIPPED | OUTPATIENT
Start: 2025-05-12

## 2025-05-12 RX ORDER — TRAZODONE HYDROCHLORIDE 100 MG/1
100 TABLET ORAL NIGHTLY PRN
Qty: 90 TABLET | Refills: 1 | Status: SHIPPED | OUTPATIENT
Start: 2025-05-12 | End: 2026-05-12

## 2025-05-12 RX ORDER — LEVOTHYROXINE SODIUM 175 UG/1
175 TABLET ORAL DAILY
Qty: 90 TABLET | Refills: 0 | Status: SHIPPED | OUTPATIENT
Start: 2025-05-12

## 2025-05-12 RX ORDER — BUSPIRONE HYDROCHLORIDE 5 MG/1
5 TABLET ORAL 2 TIMES DAILY
Qty: 180 TABLET | Refills: 1 | Status: SHIPPED | OUTPATIENT
Start: 2025-05-12

## 2025-05-12 ASSESSMENT — ENCOUNTER SYMPTOMS
IRRITABILITY: 0
COUGH: 0
RHINORRHEA: 0
FEVER: 0
DIZZINESS: 0
DEPRESSED MOOD: 1
FREQUENCY: 0
TREMORS: 0
PALPITATIONS: 0
WHEEZING: 0
HEMATURIA: 0
BACK PAIN: 1
HEADACHES: 0
NERVOUS/ANXIOUS: 1
NUMBNESS: 0
SHORTNESS OF BREATH: 0
INSOMNIA: 1
ADENOPATHY: 0
DYSURIA: 0
POLYDIPSIA: 0
ARTHRALGIAS: 1
ABDOMINAL PAIN: 0
CONSTIPATION: 0
COMPULSIONS: 0
JOINT SWELLING: 1
SORE THROAT: 0
BRUISES/BLEEDS EASILY: 0
POLYPHAGIA: 0
FEELING OF CHOKING: 0
RESTLESSNESS: 0
VOMITING: 0
CHILLS: 0
FATIGUE: 1
DIARRHEA: 0

## 2025-05-12 ASSESSMENT — PATIENT HEALTH QUESTIONNAIRE - PHQ9
SUM OF ALL RESPONSES TO PHQ9 QUESTIONS 1 AND 2: 1
1. LITTLE INTEREST OR PLEASURE IN DOING THINGS: NOT AT ALL
10. IF YOU CHECKED OFF ANY PROBLEMS, HOW DIFFICULT HAVE THESE PROBLEMS MADE IT FOR YOU TO DO YOUR WORK, TAKE CARE OF THINGS AT HOME, OR GET ALONG WITH OTHER PEOPLE: NOT DIFFICULT AT ALL
2. FEELING DOWN, DEPRESSED OR HOPELESS: SEVERAL DAYS

## 2025-05-12 ASSESSMENT — ANXIETY QUESTIONNAIRES
2. NOT BEING ABLE TO STOP OR CONTROL WORRYING: NOT AT ALL
IF YOU CHECKED OFF ANY PROBLEMS ON THIS QUESTIONNAIRE, HOW DIFFICULT HAVE THESE PROBLEMS MADE IT FOR YOU TO DO YOUR WORK, TAKE CARE OF THINGS AT HOME, OR GET ALONG WITH OTHER PEOPLE: SOMEWHAT DIFFICULT
6. BECOMING EASILY ANNOYED OR IRRITABLE: NOT AT ALL
GAD7 TOTAL SCORE: 2
7. FEELING AFRAID AS IF SOMETHING AWFUL MIGHT HAPPEN: NOT AT ALL
1. FEELING NERVOUS, ANXIOUS, OR ON EDGE: MORE THAN HALF THE DAYS
5. BEING SO RESTLESS THAT IT IS HARD TO SIT STILL: NOT AT ALL
4. TROUBLE RELAXING: NOT AT ALL
3. WORRYING TOO MUCH ABOUT DIFFERENT THINGS: NOT AT ALL

## 2025-05-12 NOTE — ASSESSMENT & PLAN NOTE
Stable, continue current medications and management.  The risks and benefits of my recommendations, as well as other treatment options were discussed with the patient today.    The side effects of the medications were discussed.  Advised not to take the medication with alcohol .  Exercise regularly and this can give you a sense of well being and help decrease feelings of anxiety.;  Get plenty of sleep. Sleep rests your brain as well as your body, and can improve your general sense of wellbeing as well as your mood.;  Avoid alcohol and drug abuse. It may seem that alcohol or drugs relax you. But in the long run they make anxiety worse and cause more problems.;  Avoid caffeine. Caffeine is found in coffee, tea, soft drinks and chocolate. Caffeine may increase your sense of anxiety because it stimulates your nervous system. Also avoid over-the-counter diet pills, and cough and cold medicines that contain a decongestant..

## 2025-05-12 NOTE — PROGRESS NOTES
Subjective   Patient ID: Marlene Alvarez is a 79 y.o. female who presents for Follow-up (Hypertension, Hyperlipidemia, Hypothyroidism, Anxiety, Depression and labs) and Suspicious Skin Lesion.    Patient is here for follow-up on hypertension and hyperlipidemia. She has no chest pain, dyspnea, exertional chest pressure/discomfort, near-syncope, orthopnea, palpitations, paroxysmal nocturnal dyspnea, and syncope. Taking her medication regularly with no side effects.    She presents for follow up of hypothyroidism. Current symptoms: fatigue. She has no diarrhea, heat / cold intolerance, nervousness, palpitations, or weight changes. She has not been taking the Levothyroxine first thing in the morning on an empty stomach.     She complains of a scalp lesion. She states it has been there a long time and it scabs over. Her daughter states she first noticed it last year.     Anxiety  Presents for follow-up visit. Symptoms include depressed mood, insomnia and nervous/anxious behavior. Patient reports no chest pain, compulsions, dizziness, excessive worry, feeling of choking, irritability, palpitations, restlessness, shortness of breath or suicidal ideas. The quality of sleep is fair.     Compliance with medications is %.        Review of Systems   Constitutional:  Positive for fatigue. Negative for chills, fever and irritability.   HENT:  Negative for congestion, ear pain, nosebleeds, rhinorrhea and sore throat.    Respiratory:  Negative for cough, shortness of breath and wheezing.    Cardiovascular:  Negative for chest pain, palpitations and leg swelling.   Gastrointestinal:  Negative for abdominal pain, constipation, diarrhea and vomiting.   Endocrine: Negative for cold intolerance, heat intolerance, polydipsia and polyphagia.   Genitourinary:  Negative for dysuria, frequency and hematuria.   Musculoskeletal:  Positive for arthralgias, back pain, gait problem and joint swelling.   Neurological:  Negative for  "dizziness, tremors, numbness and headaches.   Hematological:  Negative for adenopathy. Does not bruise/bleed easily.   Psychiatric/Behavioral:  Negative for suicidal ideas. The patient is nervous/anxious and has insomnia.      Objective   /70   Pulse 70   Temp 36.4 °C (97.5 °F)   Resp 17   Ht 1.6 m (5' 3\")   Wt 83 kg (183 lb)   SpO2 98%   BMI 32.42 kg/m²     Physical Exam  Constitutional:       General: She is not in acute distress.     Appearance: Normal appearance.   HENT:      Head: Normocephalic and atraumatic.      Mouth/Throat:      Mouth: Mucous membranes are moist.      Pharynx: Oropharynx is clear. No oropharyngeal exudate or posterior oropharyngeal erythema.   Eyes:      General: No scleral icterus.     Extraocular Movements: Extraocular movements intact.      Pupils: Pupils are equal, round, and reactive to light.   Cardiovascular:      Rate and Rhythm: Normal rate and regular rhythm.      Pulses: Normal pulses.      Heart sounds: No murmur heard.     No friction rub. No gallop.   Pulmonary:      Effort: Pulmonary effort is normal.      Breath sounds: No wheezing, rhonchi or rales.   Musculoskeletal:      Cervical back: Normal range of motion.      Right lower leg: No edema.      Left lower leg: No edema.   Skin:     General: Skin is warm.      Coloration: Skin is not jaundiced or pale.      Findings: No erythema or rash.   Neurological:      General: No focal deficit present.      Mental Status: She is alert and oriented to person, place, and time.      Cranial Nerves: No cranial nerve deficit.      Sensory: No sensory deficit.      Coordination: Coordination normal.      Gait: Gait normal.         Orders Only on 04/28/2025   Component Date Value Ref Range Status    WHITE BLOOD CELL COUNT 04/23/2025 5.4  3.8 - 10.8 Thousand/uL Final    RED BLOOD CELL COUNT 04/23/2025 4.00  3.80 - 5.10 Million/uL Final    HEMOGLOBIN 04/23/2025 12.5  11.7 - 15.5 g/dL Final    HEMATOCRIT 04/23/2025 37.8  35.0 - " 45.0 % Final    MCV 04/23/2025 94.5  80.0 - 100.0 fL Final    MCH 04/23/2025 31.3  27.0 - 33.0 pg Final    MCHC 04/23/2025 33.1  32.0 - 36.0 g/dL Final    Comment: For adults, a slight decrease in the calculated MCHC  value (in the range of 30 to 32 g/dL) is most likely  not clinically significant; however, it should be  interpreted with caution in correlation with other  red cell parameters and the patient's clinical  condition.      RDW 04/23/2025 12.6  11.0 - 15.0 % Final    PLATELET COUNT 04/23/2025 345  140 - 400 Thousand/uL Final    MPV 04/23/2025 9.7  7.5 - 12.5 fL Final    ABSOLUTE NEUTROPHILS 04/23/2025 2,867  1,500 - 7,800 cells/uL Final    ABSOLUTE LYMPHOCYTES 04/23/2025 1,685  850 - 3,900 cells/uL Final    ABSOLUTE MONOCYTES 04/23/2025 383  200 - 950 cells/uL Final    ABSOLUTE EOSINOPHILS 04/23/2025 373  15 - 500 cells/uL Final    ABSOLUTE BASOPHILS 04/23/2025 92  0 - 200 cells/uL Final    NEUTROPHILS 04/23/2025 53.1  % Final    LYMPHOCYTES 04/23/2025 31.2  % Final    MONOCYTES 04/23/2025 7.1  % Final    EOSINOPHILS 04/23/2025 6.9  % Final    BASOPHILS 04/23/2025 1.7  % Final    GLUCOSE 04/23/2025 88  65 - 99 mg/dL Final    Comment:               Fasting reference interval         UREA NITROGEN (BUN) 04/23/2025 35 (H)  7 - 25 mg/dL Final    CREATININE 04/23/2025 1.31 (H)  0.60 - 1.00 mg/dL Final    EGFR 04/23/2025 42 (L)  > OR = 60 mL/min/1.73m2 Final    SODIUM 04/23/2025 136  135 - 146 mmol/L Final    POTASSIUM 04/23/2025 4.8  3.5 - 5.3 mmol/L Final    CHLORIDE 04/23/2025 101  98 - 110 mmol/L Final    CARBON DIOXIDE 04/23/2025 27  20 - 32 mmol/L Final    ELECTROLYTE BALANCE 04/23/2025 8  7 - 17 mmol/L (calc) Final    CALCIUM 04/23/2025 10.2  8.6 - 10.4 mg/dL Final    PROTEIN, TOTAL 04/23/2025 7.1  6.1 - 8.1 g/dL Final    ALBUMIN 04/23/2025 4.4  3.6 - 5.1 g/dL Final    BILIRUBIN, TOTAL 04/23/2025 0.5  0.2 - 1.2 mg/dL Final    ALKALINE PHOSPHATASE 04/23/2025 52  37 - 153 U/L Final    AST 04/23/2025 39 (H)   10 - 35 U/L Final    ALT 04/23/2025 24  6 - 29 U/L Final    CHOLESTEROL, TOTAL 04/23/2025 221 (H)  <200 mg/dL Final    HDL CHOLESTEROL 04/23/2025 105  > OR = 50 mg/dL Final    TRIGLYCERIDES 04/23/2025 89  <150 mg/dL Final    LDL-CHOLESTEROL 04/23/2025 98  mg/dL (calc) Final    Comment: Reference range: <100     Desirable range <100 mg/dL for primary prevention;    <70 mg/dL for patients with CHD or diabetic patients   with > or = 2 CHD risk factors.     LDL-C is now calculated using the Sabi   calculation, which is a validated novel method providing   better accuracy than the Friedewald equation in the   estimation of LDL-C.   Gato SS et al. CEM. 2013;310(19): 8789-9636   (http://education.oLyfe/faq/NSK535)      CHOL/HDLC RATIO 04/23/2025 2.1  <5.0 (calc) Final    NON HDL CHOLESTEROL 04/23/2025 116  <130 mg/dL (calc) Final    Comment: For patients with diabetes plus 1 major ASCVD risk   factor, treating to a non-HDL-C goal of <100 mg/dL   (LDL-C of <70 mg/dL) is considered a therapeutic   option.      TSH 04/23/2025 41.89 (H)  0.40 - 4.50 mIU/L Final    T4, FREE 04/23/2025 0.8  0.8 - 1.8 ng/dL Final    VITAMIN B12 04/23/2025 482  200 - 1,100 pg/mL Final    FOLATE, SERUM 04/23/2025 19.3  ng/mL Final    Comment:                 Reference Range                  Low:           <3.4                  Borderline:    3.4-5.4                  Normal:        >5.4                          Assessment/Plan   Problem List Items Addressed This Visit       Acquired hypothyroidism    She indicates that she has been taking her thyroxine patient's regularly.  She also feels we fill the medication regularly according to her records.  Increase the Levothyroxine to 175 mcg daily and have her make sure she takes it first thing in the morning on an empty stomach.  Since her TSH is still very elevated we will increase the dose of her thyroxine slightly increased from 175.  His symptoms suggestive of  "hyperthyroidism were reviewed with the patient and her daughter.  They will monitor these closely the labs in 3         Relevant Medications    levothyroxine (Synthroid, Levoxyl) 175 mcg tablet    Other Relevant Orders    Follow Up In [facility] - PCP    Thyroid Stimulating Hormone    Thyroxine, Free    Chronic anemia    Well-controlled, continue current medications and management.         Relevant Orders    Follow Up In [facility] - PCP    Stage 3b chronic kidney disease (Multi)    Stable, continue current medications and management.         Relevant Orders    Follow Up In [facility] - PCP    Essential hypertension - Primary    Well-controlled, continue current medications and management.  Dietary sodium restriction.  Regular aerobic exercise program is recommended to help achieve and maintain normal body weight, fitness and improve lipid balance. .  Dietary changes: Increase soluble fiber  Plant sterols 2grams per day (e.g. Benecol)  Reduce saturated fat, \"trans\" monounsaturated fatty acids, and cholesterol         Relevant Medications    chlorthalidone (Hygroton) 25 mg tablet    lisinopril 10 mg tablet    Other Relevant Orders    CBC and Auto Differential    Comprehensive Metabolic Panel    Follow Up In [facility] - PCP    Lipid Panel    Mixed hyperlipidemia    The nature of cardiac risk has been fully discussed with this patient. Discussed cardiovascular risk analysis and appropriate diet with the need for lifelong measures to reduce the risk. A regular exercise program is recommended to help achieve and maintain normal body weight, fitness and improve lipid balance. Patient education provided. They understand and agree with this course of treatment. They will return with new or worsening symptoms. Patient instructed to remain current with appropriate annual health maintenance.          Relevant Medications    lovastatin (Mevacor) 40 mg tablet    Other Relevant Orders "    CBC and Auto Differential    Comprehensive Metabolic Panel    Follow Up In [facility] - PCP    Lipid Panel    Moderate episode of recurrent major depressive disorder    Stable, continue current medications and management. Reviewed concept of depression as biochemical imbalance of neurotransmitters and rationale for treatment. Instructed patient to contact office or on-call physician promptly should condition worsen or any new symptoms appear          Relevant Orders    Follow Up In [facility] - PCP    Generalized anxiety disorder    Stable, continue current medications and management.  The risks and benefits of my recommendations, as well as other treatment options were discussed with the patient today.    The side effects of the medications were discussed.  Advised not to take the medication with alcohol .  Exercise regularly and this can give you a sense of well being and help decrease feelings of anxiety.;  Get plenty of sleep. Sleep rests your brain as well as your body, and can improve your general sense of wellbeing as well as your mood.;  Avoid alcohol and drug abuse. It may seem that alcohol or drugs relax you. But in the long run they make anxiety worse and cause more problems.;  Avoid caffeine. Caffeine is found in coffee, tea, soft drinks and chocolate. Caffeine may increase your sense of anxiety because it stimulates your nervous system. Also avoid over-the-counter diet pills, and cough and cold medicines that contain a decongestant..         Relevant Medications    busPIRone (Buspar) 5 mg tablet    Other Relevant Orders    Follow Up In [facility] - PCP    Chronic insomnia    Stable, continue current medications and management.         Relevant Medications    traZODone (Desyrel) 100 mg tablet    Other Relevant Orders    Follow Up In [facility] - PCP    Scalp lesion    We will Refer her to the Dermatopathologist for further evaluation.         Relevant Orders     Referral to Dermatology     Scribe Attestation  By signing my name below, I, Soco Mckenzie   attest that this documentation has been prepared under the direction and in the presence of Sheldon Up MD.

## 2025-05-12 NOTE — ASSESSMENT & PLAN NOTE
She indicates that she has been taking her thyroxine patient's regularly.  She also feels we fill the medication regularly according to her records.  Increase the Levothyroxine to 175 mcg daily and have her make sure she takes it first thing in the morning on an empty stomach.  Since her TSH is still very elevated we will increase the dose of her thyroxine slightly increased from 175.  His symptoms suggestive of hyperthyroidism were reviewed with the patient and her daughter.  They will monitor these closely the labs in 3

## 2025-06-23 ENCOUNTER — TELEPHONE (OUTPATIENT)
Dept: PRIMARY CARE | Facility: CLINIC | Age: 80
End: 2025-06-23
Payer: MEDICARE

## 2025-06-23 NOTE — TELEPHONE ENCOUNTER
Patients daughter Ese called in to see how they would go about getting the patient tested for alzheimer's? She stated the patient is easily confused and forgetful. She noted the worst day the patient had was on memorial day and noted the patient called her 4 times. She also noted the patient has improved since then. Ese stated the patients  has mentioned they will play cards and the patient will forget what they are playing in the middle of the game. She noted the patient sometimes forgets what day of the week it is  Please Advise

## 2025-06-23 NOTE — TELEPHONE ENCOUNTER
Per AP, Neurology would have to give that diagnosis. Patient is current with Dr. Lazar and has scheduled appt on 6/30/25. Patients daughter notified.

## 2025-06-30 ENCOUNTER — APPOINTMENT (OUTPATIENT)
Dept: NEUROLOGY | Facility: CLINIC | Age: 80
End: 2025-06-30
Payer: MEDICARE

## 2025-06-30 VITALS
HEART RATE: 78 BPM | BODY MASS INDEX: 33.13 KG/M2 | WEIGHT: 180 LBS | DIASTOLIC BLOOD PRESSURE: 79 MMHG | SYSTOLIC BLOOD PRESSURE: 169 MMHG | HEIGHT: 62 IN

## 2025-06-30 DIAGNOSIS — R41.3 MEMORY IMPAIRMENT: ICD-10-CM

## 2025-06-30 DIAGNOSIS — F03.A0 MILD DEMENTIA WITHOUT BEHAVIORAL DISTURBANCE, PSYCHOTIC DISTURBANCE, MOOD DISTURBANCE, OR ANXIETY, UNSPECIFIED DEMENTIA TYPE: Primary | ICD-10-CM

## 2025-06-30 PROCEDURE — 99215 OFFICE O/P EST HI 40 MIN: CPT | Performed by: STUDENT IN AN ORGANIZED HEALTH CARE EDUCATION/TRAINING PROGRAM

## 2025-06-30 PROCEDURE — 3078F DIAST BP <80 MM HG: CPT | Performed by: STUDENT IN AN ORGANIZED HEALTH CARE EDUCATION/TRAINING PROGRAM

## 2025-06-30 PROCEDURE — G2211 COMPLEX E/M VISIT ADD ON: HCPCS | Performed by: STUDENT IN AN ORGANIZED HEALTH CARE EDUCATION/TRAINING PROGRAM

## 2025-06-30 PROCEDURE — 3077F SYST BP >= 140 MM HG: CPT | Performed by: STUDENT IN AN ORGANIZED HEALTH CARE EDUCATION/TRAINING PROGRAM

## 2025-06-30 RX ORDER — DONEPEZIL HYDROCHLORIDE 10 MG/1
TABLET, FILM COATED ORAL
Qty: 60 TABLET | Refills: 2 | Status: SHIPPED | OUTPATIENT
Start: 2025-06-30 | End: 2025-10-28

## 2025-06-30 RX ORDER — TRIAMCINOLONE ACETONIDE 1 MG/G
PASTE DENTAL
COMMUNITY
Start: 2025-01-09

## 2025-06-30 ASSESSMENT — PATIENT HEALTH QUESTIONNAIRE - PHQ9
1. LITTLE INTEREST OR PLEASURE IN DOING THINGS: NOT AT ALL
2. FEELING DOWN, DEPRESSED OR HOPELESS: SEVERAL DAYS
SUM OF ALL RESPONSES TO PHQ9 QUESTIONS 1 & 2: 1

## 2025-06-30 NOTE — PATIENT INSTRUCTIONS
NM PET CT amyloid brain . Please let me know if you have any issues getting this.     For your memory, please take 1/2 tablet at bedtime for 1 month. After 1 month, you can increase to 1 tablet at bedtime.  Side effects to look out for include dizziness, tiredness, upset stomach. Please call my office if you have any side effects    Please get bloodwork completed.     Follow up in about 3 months    Tips for brain health:  1. Mediterranean diet: green leafy veggies, berries, fish, olive oil. No red meat, fried foods or processed sugars. Limit alcohol intake.  2. Brain exercises such as Lumosity, word puzzles, word games, card games.  3. Keep notepad to write things down. Make lists and follow them.  4.Engage in 20-30 minutes daily physical exercise: Walking, light weights, swimming, etc.  5. Get at least 7-9 hours sleep nightly.  6. Social engagement and having a community and support system will help keep your mind active and healthy.  7. If you feel depressed or down, reach out and get help.    Here are more resources available for you :    a. Consultation with a  who specializes in cognitive decline in older adulthood. They can assist with counseling, educational resources, patient support groups, caregiver support groups, and preparation for future changes. Beth Wachter at [facility] ([address] [place] [address] [address] in [place]; [phone]) could help with a referral, or she can contact the Alzheimer's Association 24-hour Helpline (1-214.307.3238).    b. Tricia Diego at [facility] runs a caregiver training program that uses empirically based techniques to prepare and equip caregivers for the demands of that role when caring for someone with dementia.    c. Helpful resources for addressing the day-to-day challenges of cognitive dysfunction are offered at the Alzheimer's Association (now the Alzheimer's Disease and Related Disorders Association) website  (www.alz.org) or by calling their 24-hour Helpline (1-257.850.2658).    d. The Family Caregiver [place] website also has helpful information: http://www.caregiver.org/caregiver/jsp/home.jsp

## 2025-06-30 NOTE — PROGRESS NOTES
"   [facility]   Referring: Below, Radha GÓMEZ DO  PCP: Sheldon Up MD  Date of service: 6/30/25    Chief Complaint   Patient presents with    Memory Loss     4 month follow up, discuss Alzheimer's.      HISTORY OF PRESENT ILLNESS     Subjective     Marlene Alvarez is a 79 y.o. female who presents for initial evaluation  of memory impairment. Her past medical history is significant for hypothyroidism, CKD, HTN, HLD, depression/anxiety, hearing-aid user (over 30+ years) and insomnia.     Marlene first started to notice issues with her memory a few weeks ago. In particular, she is noticing that it is harder for her to remember things she has to do so she now has to really concentrate \"to make sure I keep things in my head.\" She already has been using multiple calendars for years as a way to write down what she has to do in order not to forget. She is not forgetting familiar people or getting lost in familiar places. She denies any visual/auditory hallucinations, tremors, changes in her gait or recent falls. Her chronic anxiety and depression is fairly well controlled on paxil. Years ago she did seem to have issues with insomnia but since she has been on trazodone, her sleeping has somewhat improved but she still continues to have disjointed sleep, only sleeping a few hours at a time. She does not know if she snores.     Marlene is fully independent of her IADLs/ADLs as well as actively drives local distances. She has used a weekly pill box for sometime to ensure that she never misses a medication. She does not need lists at the grocery store to remember what she needs. She also fully takes care of her own home (and her cats) as her  lives in a nursing home nearby. 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     She denies history of prior stroke or seizures. Remainder of neurology ROS negative.     Of note, her most recent TSH was very elevated (33.24) with no prior history of values in that range. Her PCP is closely monitoring this.     SHx: remote tobacco use (quit over 50 years ago). Denies alcohol or marijuana use    FHx: no relevant neurological family history    Interval history:  Last visit 3/3/2025. She presents with her daughter Ese who assists in providing history. MRI brain demonstrates mild/mod generalized atrophy with predominance in bilateral temporal lobes (left > right). She did not get labwork completed that was ordered at last appointment.   Daughter is able to clarify clinical history in more details than was provided at prior appointment. Her daughter first started noticing that Marlene's memory started to worsen in fall 2024 (longer timeframe than what was reported by Marlene at our first appointment). 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 In April 2025, Marlene's 's insurance somehow developed an issue that required aMrlene to reapply for it. This became a very stressful task that seemed to heighten her forgetfulness even more. Over the last few months, this has continued to worsen, with Marlene easily becoming upset, having increased difficulty using her phone and was not taking her thyroid medication correctly. More recently she forgot to pay her water bill and now her daughter has stepped in to help oversee the finances. She overall prefers not to cook but when she does, she tries to only cook simple meals. On one occasion she left the stove on. She is able to drive locally to familiar places for her errands without issues but may get confused if she is driving to more unfamiliar  places. She remains independent of her ADLs.   No concerns for changes in her mood or hallucinations. She denies staring spells, nocturnal oral trauma, seizure-like activity, recent falls, tremors, weakness or vision changes.   Her daughter is concerned that she Marlene is developing dementia and would like her to be tested for that.   Of note, she recently developed skin cancer (not melanoma) on her head for which she is following with dermatology.       Patient Active Problem List   Diagnosis    Acquired hypothyroidism    Alternating constipation and diarrhea    Arcus senilis, bilateral    Chronic anemia    Chronic constipation with overflow    Chronic diarrhea    Stage 3b chronic kidney disease (Multi)    Chronic right shoulder pain    Cornea guttata    Essential hypertension    Gastro-esophageal reflux disease without esophagitis    Glaucoma    Hip bursitis    History of colonic polyps    Knee osteoarthritis    Mixed hyperlipidemia    Moderate episode of recurrent major depressive disorder    Mixed incontinence    Nuclear sclerotic cataract of both eyes    Cataracts, bilateral    Spurious diarrhea    Trochanteric bursitis of right hip    Aortic valve stenosis    Routine general medical examination at health care facility    Hyperkalemia    Chronic fatigue    Obstructive sleep apnea    Pruritus of skin    Keratoconjunctivitis sicca    Leg cramps    Acute bronchitis, unspecified    Chronic allergic rhinitis    Generalized anxiety disorder    Nocturia    Chronic insomnia    Memory impairment    Scalp lesion     Past Medical History:   Diagnosis Date    Anxiety     Anxiety disorder, unspecified     Anxiety and depression    HL (hearing loss)     Hypertension     Personal history of other diseases of the circulatory system     History of hypertension    Personal history of other diseases of the nervous system and sense organs     History of sleep apnea    Personal history of other endocrine, nutritional and metabolic  disease     History of hypercholesterolemia    Personal history of other endocrine, nutritional and metabolic disease     History of hypothyroidism    Personal history of other mental and behavioral disorders     History of depression    Sleep apnea      Past Surgical History:   Procedure Laterality Date    CHOLECYSTECTOMY  2019    Cholecystectomy    HYSTERECTOMY  2019    Hysterectomy    OTHER SURGICAL HISTORY  2022    Colonoscopy    TOTAL KNEE ARTHROPLASTY  2015    Knee Replacement     Social History     Tobacco Use    Smoking status: Former     Current packs/day: 0.00     Average packs/day: 1 pack/day for 2.5 years (2.5 ttl pk-yrs)     Types: Cigarettes     Start date: 1967     Quit date: 1970     Years since quittin.5     Passive exposure: Past    Smokeless tobacco: Never   Substance Use Topics    Alcohol use: Not Currently     family history includes Breast cancer in her daughter and father's sister; Prostate cancer in her father; bladder cancer in her mother.    Current Outpatient Medications   Medication Instructions    busPIRone (BUSPAR) 5 mg, oral, 2 times daily    chlorthalidone (HYGROTON) 25 mg, oral, Daily    cholecalciferol (Vitamin D-3) 25 MCG (1000 UT) tablet 1 tablet, Daily    donepezil (Aricept) 10 mg tablet Take 0.5 tablets (5 mg) by mouth once daily at bedtime for 30 days, THEN 1 tablet (10 mg) once daily at bedtime.    famotidine (PEPCID) 20 mg, oral, 2 times daily    ferrous sulfate 325 (65 Fe) MG tablet 1 tablet, 2 times daily    hydrOXYzine HCL (ATARAX) 25 mg, oral, 3 times daily    levothyroxine (SYNTHROID, LEVOXYL) 175 mcg, oral, Daily, Take on an empty stomach every morning, either 30 to 60 minutes prior to breakfast    lisinopril 10 mg, oral, Daily    lovastatin (MEVACOR) 40 mg, oral, Nightly    mirabegron (MYRBETRIQ) 50 mg, oral, Daily    multivitamin tablet 1 tablet, Daily    PARoxetine (PAXIL) 20 mg, oral, Every morning    tamsulosin (FLOMAX) 0.4 mg, oral,  "Daily    traZODone (DESYREL) 100 mg, oral, Nightly PRN    triamcinolone (Kenalog) 0.1 % oral paste APPLY THIN LAYER TO AFFECTED AREA 3 TIMES DAILY     Allergies   Allergen Reactions    Nitrofurantoin Itching    Nitrofurantoin Monohyd/M-Cryst Other     flu-like symptoms    Sulfamethoxazole-Trimethoprim Other     flu-like symptoms    Moxifloxacin Rash       PHYSICAL EXAM     Objective   Neurological Exam  Physical Exam  No micrographia, hypophonia or hypomimia     Neurologic Exam:    Mental Status:   Memory: 3/3 immediate recall, 0/3 delayed recall   Attention/Concentration: 4/5 serial 7s  Fund of knowledge: fair  Orientation: oriented to person, location and month/year/TONY but not date   Language: normal fluency comprehension repetition and naming   Speech: is fluent, no dysarthria  Can draw clock with correct placement of numbers but not correct placement of hands for ten past eleven (see below)    MMSE 24/30  -1 incorrect date (\"29th\")  -1 4/5 correct serial 7s  -3 0/3 correct delayed recall  -1 does not follow multi-step commands correctly      MMSE 29/30 (3/3/2025)    Cranial Nerves:  Pupils: OD 4 mm to 3 mm; OS 4 mm to 3 mm (no RAPD)  Visual Fields: (R) visual field full to confrontation; (L) visual field full to confrontation  CN III, CN IV, CN VI (Extraocular movements): extraocular eye movements intact.    CN V:  normal sensation in all three divisions of the trigeminal nerves, bilaterally.  CN VII: normal facial muscle strength bilaterally  CN VIII: auditory acuity intact to bedside testing  CN IX/CN X: normal palate elevation  CN XI: normal strength of trapezius and SCM muscles, bilaterally.  CN XII: tongue protrudes in the midline, no atrophy or fasciculations    Motor Exam:   Muscle Bulk: No atrophy or fasciculations   Muscle Tone: physiologic tone in upper and lower extremities   Involuntary movements: none  Pronator drift: absent   Strength:  [id number]     [id number]     " [id number]    Sensory:   Light touch:intact in all 4 extremities.   Vibration:intact in all 4 extremities.     Romberg:   Intact without sway.    Reflexes:   RL  B2+2+   T  2+2+  BR 2+2+   Ptracetrace   Atracetrace    Coordination:   Normal: Rapid alternating movements are performed with normal speed, amplitude and rhythm; finger to nose and heel-knee-shin movements performed accurately and without dysmetria.     Gait:   Slightly wide based, antalgic gait. No shuffling        RESULTS   Data reviewed:  MR brain wo IV contrast  Result Date: 3/26/2025  There is no MRI evidence of acute infarction on the diffusion weighted images.   There is mild-to-moderate brain parenchymal volume loss.   There are scattered nonspecific white matter changes within the cerebral hemispheres bilaterally which while nonspecific, given the patient's age, likely represent sequelae of more remote small-vessel ischemic change.   MACRO: None.   Signed by: Dwayne Cheatham 3/26/2025 8:14 AM Dictation workstation:   [id number]      Serum 1/2025 - CMP wnl except for Cr 1.48, GFR 36, AST 40. TSH 34.05 (prior 0.89 in 10/2024)  Serum 7/2024 - B12 648    Lab Results   Component Value Date    [id number] 482 04/23/2025    SPEP Normal. 01/02/2024    TSH 41.89 (H) 04/23/2025     IMPRESSION AND PLAN   Marlene Alvarez is a 80 yo woman with PMHx of hypothyroidism, CKD, HTN, HLD, depression/anxiety, hearing-aid user (over 30+ years) and insomnia who presents with gradually progressive impaired short term memory since at least fall 2024, more recently starting to interfere with her IADLs. Her daughter, who was not present at her last encounter, provided a clinical history concerning of more significant memory decline that what Marlene shared on her own at our initial encounter in March 2025. MRI brain wo from 3/2025 demonstrated mild/moderate generalized parenchymal atrophy, most notable in bilateral temporal lobes. MMSE 24/30 without parkinsonian features on exam.  Although her recently elevated TSH from incorrectly taken thyroid medication may be playing somewhat of a role in her overall clinical presentation, a mild cognitive impairment vs more likely early stages of dementia cannot be excluded. Her daughter is requesting more formal evaluation of possible Alz dementia, with consideration of possible treatment options available. Will obtain NM CT amyloid for further evaluation.     Plan:  - NM CT amyloid for consideration of amyloid-targeting therapy   - serum T. Pallidium, B2 ordered at last encounter not yet obtained. Encouraged patient to obtain this  - patient prefers to hold off on neuropsych memory testing for now  - patient is interested in starting possible memory-enhancing medication. Will start aricept 5mg qHS. After 1 month, can increase to 10mg qHS. Side effects reviewed.   - caregiver resources provided  - patient understands and agrees to plan  - RTC 4 months       Diagnoses and all orders for this visit:  Mild dementia without behavioral disturbance, psychotic disturbance, mood disturbance, or anxiety, unspecified dementia type  -     NM PET CT amyloid brain; Future  -     donepezil (Aricept) 10 mg tablet; Take 0.5 tablets (5 mg) by mouth once daily at bedtime for 30 days, THEN 1 tablet (10 mg) once daily at bedtime.  -     Follow Up In Neurology; Future  Memory impairment  -     Follow Up In Neurology          Patient Instructions   NM PET CT amyloid brain . Please let me know if you have any issues getting this.     For your memory, please take 1/2 tablet at bedtime for 1 month. After 1 month, you can increase to 1 tablet at bedtime.  Side effects to look out for include dizziness, tiredness, upset stomach. Please call my office if you have any side effects    Please get bloodwork completed.     Follow up in about 3 months    Tips for brain health:  1. Mediterranean diet: green leafy veggies, berries, fish, olive oil. No red meat, fried foods or processed  sugars. Limit alcohol intake.  2. Brain exercises such as Lumosity, word puzzles, word games, card games.  3. Keep notepad to write things down. Make lists and follow them.  4.Engage in 20-30 minutes daily physical exercise: Walking, light weights, swimming, etc.  5. Get at least 7-9 hours sleep nightly.  6. Social engagement and having a community and support system will help keep your mind active and healthy.  7. If you feel depressed or down, reach out and get help.    Here are more resources available for you :    a. Consultation with a  who specializes in cognitive decline in older adulthood. They can assist with counseling, educational resources, patient support groups, caregiver support groups, and preparation for future changes. Beth Wachter at [facility] ([address] [place] [address] [address] in [place]; [phone]) could help with a referral, or she can contact the Alzheimer's Association 24-hour Helpline (1-915.881.4823).    b. Tricia Diego at [facility] runs a caregiver training program that uses empirically based techniques to prepare and equip caregivers for the demands of that role when caring for someone with dementia.    c. Helpful resources for addressing the day-to-day challenges of cognitive dysfunction are offered at the Alzheimer's Association (now the Alzheimer's Disease and Related Disorders Association) website (www.alz.org) or by calling their 24-hour Helpline (1-561.770.5368).    d. The [employer] [place] website also has helpful information: http://www.caregiver.org/caregiver/jsp/home.jsp      Radha Below, DO  Neurology  [facility]    I personally spent 50 minutes on the day of the visit completing the review of the medical record and outside records, obtaining history and performing an appropriate physical exam, patient care, counseling and education, placing orders, independently reviewing results, communicating with  the patient/family and other providers, coordinating care and performing appropriate clinical documentation.

## 2025-06-30 NOTE — LETTER
"July 12, 2025     Sheldon Up MD  [address] [address]    Patient: Marlene Alvarez   YOB: 1945   Date of Visit: 6/30/2025       Dear Dr. Sheldon Up MD:    Thank you for referring Marlene Alvarez to me for evaluation. Below are my notes for this consultation.  If you have questions, please do not hesitate to call me. I look forward to following your patient along with you.       Sincerely,     Radha Lazar,       CC: No Recipients  ______________________________________________________________________________________       [facility]   Referring: Radha Lazar DO  PCP: Sheldon Up MD  Date of service: 6/30/25    Chief Complaint   Patient presents with   • Memory Loss     4 month follow up, discuss Alzheimer's.      HISTORY OF PRESENT ILLNESS     Subjective    Marlene Alvarez is a 79 y.o. female who presents for initial evaluation  of memory impairment. Her past medical history is significant for hypothyroidism, CKD, HTN, HLD, depression/anxiety, hearing-aid user (over 30+ years) and insomnia.     Marlene first started to notice issues with her memory a few weeks ago. In particular, she is noticing that it is harder for her to remember things she has to do so she now has to really concentrate \"to make sure I keep things in my head.\" She already has been using multiple calendars for years as a way to write down what she has to do in order not to forget. She is not forgetting familiar people or getting lost in familiar places. She denies any visual/auditory hallucinations, tremors, changes in her gait or recent falls. Her chronic anxiety and depression is fairly well controlled on paxil. Years ago she did seem to have issues with insomnia but since she has been on trazodone, her sleeping has somewhat improved but she still continues to have disjointed sleep, only sleeping a few hours at a time. She does not know if she snores.     Marlene " "is fully independent of her IADLs/ADLs as well as actively drives local distances. She has used a weekly pill box for sometime to ensure that she never misses a medication. She does not need lists at the grocery store to remember what she needs. She also fully takes care of her own home (and her cats) as her  lives in a nursing home nearby. At one point after she retired she was frauded out of several thousands of dollars from a intermediate fund from her work but she believes this was an \"inside job\" as she never was contacted by someone asking for information regarding that account before learning it was stolen.     She denies history of prior stroke or seizures. Remainder of neurology ROS negative.     Of note, her most recent TSH was very elevated (33.24) with no prior history of values in that range. Her PCP is closely monitoring this.     SHx: remote tobacco use (quit over 50 years ago). Denies alcohol or marijuana use    FHx: no relevant neurological family history    Interval history:  Last visit 3/3/2025. She presents with her daughter Ese who assists in providing history. MRI brain demonstrates mild/mod generalized atrophy with predominance in bilateral temporal lobes (left > right). She did not get labwork completed that was ordered at last appointment.   Daughter is able to clarify clinical history in more details than was provided at prior appointment. Her daughter first started noticing that Marlene's memory started to worsen in fall 2024 (longer timeframe than what was reported by Marlene at our first appointment). 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 In April 2025, Marlene's 's insurance somehow developed an issue that required Marlene to reapply for it. This became a very stressful task that seemed to heighten her forgetfulness " even more. Over the last few months, this has continued to worsen, with Marlene easily becoming upset, having increased difficulty using her phone and was not taking her thyroid medication correctly. More recently she forgot to pay her water bill and now her daughter has stepped in to help oversee the finances. She overall prefers not to cook but when she does, she tries to only cook simple meals. On one occasion she left the stove on. She is able to drive locally to familiar places for her errands without issues but may get confused if she is driving to more unfamiliar places. She remains independent of her ADLs.   No concerns for changes in her mood or hallucinations. She denies staring spells, nocturnal oral trauma, seizure-like activity, recent falls, tremors, weakness or vision changes.   Her daughter is concerned that she Marlene is developing dementia and would like her to be tested for that.   Of note, she recently developed skin cancer (not melanoma) on her head for which she is following with dermatology.       Patient Active Problem List   Diagnosis   • Acquired hypothyroidism   • Alternating constipation and diarrhea   • Arcus senilis, bilateral   • Chronic anemia   • Chronic constipation with overflow   • Chronic diarrhea   • Stage 3b chronic kidney disease (Multi)   • Chronic right shoulder pain   • Cornea guttata   • Essential hypertension   • Gastro-esophageal reflux disease without esophagitis   • Glaucoma   • Hip bursitis   • History of colonic polyps   • Knee osteoarthritis   • Mixed hyperlipidemia   • Moderate episode of recurrent major depressive disorder   • Mixed incontinence   • Nuclear sclerotic cataract of both eyes   • Cataracts, bilateral   • Spurious diarrhea   • Trochanteric bursitis of right hip   • Aortic valve stenosis   • Routine general medical examination at health care facility   • Hyperkalemia   • Chronic fatigue   • Obstructive sleep apnea   • Pruritus of skin   •  Keratoconjunctivitis sicca   • Leg cramps   • Acute bronchitis, unspecified   • Chronic allergic rhinitis   • Generalized anxiety disorder   • Nocturia   • Chronic insomnia   • Memory impairment   • Scalp lesion     Past Medical History:   Diagnosis Date   • Anxiety    • Anxiety disorder, unspecified     Anxiety and depression   • HL (hearing loss)    • Hypertension    • Personal history of other diseases of the circulatory system     History of hypertension   • Personal history of other diseases of the nervous system and sense organs     History of sleep apnea   • Personal history of other endocrine, nutritional and metabolic disease     History of hypercholesterolemia   • Personal history of other endocrine, nutritional and metabolic disease     History of hypothyroidism   • Personal history of other mental and behavioral disorders     History of depression   • Sleep apnea      Past Surgical History:   Procedure Laterality Date   • CHOLECYSTECTOMY  2019    Cholecystectomy   • HYSTERECTOMY  2019    Hysterectomy   • OTHER SURGICAL HISTORY  2022    Colonoscopy   • TOTAL KNEE ARTHROPLASTY  2015    Knee Replacement     Social History     Tobacco Use   • Smoking status: Former     Current packs/day: 0.00     Average packs/day: 1 pack/day for 2.5 years (2.5 ttl pk-yrs)     Types: Cigarettes     Start date: 1967     Quit date: 1970     Years since quittin.5     Passive exposure: Past   • Smokeless tobacco: Never   Substance Use Topics   • Alcohol use: Not Currently     family history includes Breast cancer in her daughter and father's sister; Prostate cancer in her father; bladder cancer in her mother.    Current Outpatient Medications   Medication Instructions   • busPIRone (BUSPAR) 5 mg, oral, 2 times daily   • chlorthalidone (HYGROTON) 25 mg, oral, Daily   • cholecalciferol (Vitamin D-3) 25 MCG (1000 UT) tablet 1 tablet, Daily   • donepezil (Aricept) 10 mg tablet Take 0.5 tablets (5 mg)  "by mouth once daily at bedtime for 30 days, THEN 1 tablet (10 mg) once daily at bedtime.   • famotidine (PEPCID) 20 mg, oral, 2 times daily   • ferrous sulfate 325 (65 Fe) MG tablet 1 tablet, 2 times daily   • hydrOXYzine HCL (ATARAX) 25 mg, oral, 3 times daily   • levothyroxine (SYNTHROID, LEVOXYL) 175 mcg, oral, Daily, Take on an empty stomach every morning, either 30 to 60 minutes prior to breakfast   • lisinopril 10 mg, oral, Daily   • lovastatin (MEVACOR) 40 mg, oral, Nightly   • mirabegron (MYRBETRIQ) 50 mg, oral, Daily   • multivitamin tablet 1 tablet, Daily   • PARoxetine (PAXIL) 20 mg, oral, Every morning   • tamsulosin (FLOMAX) 0.4 mg, oral, Daily   • traZODone (DESYREL) 100 mg, oral, Nightly PRN   • triamcinolone (Kenalog) 0.1 % oral paste APPLY THIN LAYER TO AFFECTED AREA 3 TIMES DAILY     Allergies   Allergen Reactions   • Nitrofurantoin Itching   • Nitrofurantoin Monohyd/M-Cryst Other     flu-like symptoms   • Sulfamethoxazole-Trimethoprim Other     flu-like symptoms   • Moxifloxacin Rash       PHYSICAL EXAM     Objective  Neurological Exam  Physical Exam  No micrographia, hypophonia or hypomimia     Neurologic Exam:    Mental Status:   Memory: 3/3 immediate recall, 0/3 delayed recall   Attention/Concentration: 4/5 serial 7s  Fund of knowledge: fair  Orientation: oriented to person, location and month/year/TONY but not date   Language: normal fluency comprehension repetition and naming   Speech: is fluent, no dysarthria  Can draw clock with correct placement of numbers but not correct placement of hands for ten past eleven (see below)    MMSE 24/30  -1 incorrect date (\"29th\")  -1 4/5 correct serial 7s  -3 0/3 correct delayed recall  -1 does not follow multi-step commands correctly      MMSE 29/30 (3/3/2025)    Cranial Nerves:  Pupils: OD 4 mm to 3 mm; OS 4 mm to 3 mm (no RAPD)  Visual Fields: (R) visual field full to confrontation; (L) visual field full to confrontation  CN III, CN IV, CN VI " (Extraocular movements): extraocular eye movements intact.    CN V:  normal sensation in all three divisions of the trigeminal nerves, bilaterally.  CN VII: normal facial muscle strength bilaterally  CN VIII: auditory acuity intact to bedside testing  CN IX/CN X: normal palate elevation  CN XI: normal strength of trapezius and SCM muscles, bilaterally.  CN XII: tongue protrudes in the midline, no atrophy or fasciculations    Motor Exam:   Muscle Bulk: No atrophy or fasciculations   Muscle Tone: physiologic tone in upper and lower extremities   Involuntary movements: none  Pronator drift: absent   Strength:  [id number]     [id number]     [id number]    Sensory:   Light touch:intact in all 4 extremities.   Vibration:intact in all 4 extremities.     Romberg:   Intact without sway.    Reflexes:   RL  B2+2+   T  2+2+  BR 2+2+   Ptracetrace   Atracetrace    Coordination:   Normal: Rapid alternating movements are performed with normal speed, amplitude and rhythm; finger to nose and heel-knee-shin movements performed accurately and without dysmetria.     Gait:   Slightly wide based, antalgic gait. No shuffling        RESULTS   Data reviewed:  MR brain wo IV contrast  Result Date: 3/26/2025  There is no MRI evidence of acute infarction on the diffusion weighted images.   There is mild-to-moderate brain parenchymal volume loss.   There are scattered nonspecific white matter changes within the cerebral hemispheres bilaterally which while nonspecific, given the patient's age, likely represent sequelae of more remote small-vessel ischemic change.   MACRO: None.   Signed by: Dwayne Cheatham 3/26/2025 8:14 AM Dictation workstation:   [id number]      Serum 1/2025 - CMP wnl except for Cr 1.48, GFR 36, AST 40. TSH 34.05 (prior 0.89 in 10/2024)  Serum 7/2024 - B12 648    Lab Results   Component Value Date    [id number] 482 04/23/2025    SPEP Normal. 01/02/2024    TSH 41.89 (H) 04/23/2025     IMPRESSION AND PLAN   Taylors  Antonio is a 78 yo woman with PMHx of hypothyroidism, CKD, HTN, HLD, depression/anxiety, hearing-aid user (over 30+ years) and insomnia who presents with gradually progressive impaired short term memory since at least fall 2024, more recently starting to interfere with her IADLs. Her daughter, who was not present at her last encounter, provided a clinical history concerning of more significant memory decline that what Marlene shared on her own at our initial encounter in March 2025. MRI brain wo from 3/2025 demonstrated mild/moderate generalized parenchymal atrophy, most notable in bilateral temporal lobes. MMSE 24/30 without parkinsonian features on exam. Although her recently elevated TSH from incorrectly taken thyroid medication may be playing somewhat of a role in her overall clinical presentation, a mild cognitive impairment vs more likely early stages of dementia cannot be excluded. Her daughter is requesting more formal evaluation of possible Alz dementia, with consideration of possible treatment options available. Will obtain NM CT amyloid for further evaluation.     Plan:  - NM CT amyloid for consideration of amyloid-targeting therapy   - serum T. Pallidium, B2 ordered at last encounter not yet obtained. Encouraged patient to obtain this  - patient prefers to hold off on neuropsych memory testing for now  - patient is interested in starting possible memory-enhancing medication. Will start aricept 5mg qHS. After 1 month, can increase to 10mg qHS. Side effects reviewed.   - caregiver resources provided  - patient understands and agrees to plan  - RTC 4 months       Diagnoses and all orders for this visit:  Mild dementia without behavioral disturbance, psychotic disturbance, mood disturbance, or anxiety, unspecified dementia type  -     NM PET CT amyloid brain; Future  -     donepezil (Aricept) 10 mg tablet; Take 0.5 tablets (5 mg) by mouth once daily at bedtime for 30 days, THEN 1 tablet (10 mg) once daily at  bedtime.  -     Follow Up In Neurology; Future  Memory impairment  -     Follow Up In Neurology          Patient Instructions   NM PET CT amyloid brain . Please let me know if you have any issues getting this.     For your memory, please take 1/2 tablet at bedtime for 1 month. After 1 month, you can increase to 1 tablet at bedtime.  Side effects to look out for include dizziness, tiredness, upset stomach. Please call my office if you have any side effects    Please get bloodwork completed.     Follow up in about 3 months    Tips for brain health:  1. Mediterranean diet: green leafy veggies, berries, fish, olive oil. No red meat, fried foods or processed sugars. Limit alcohol intake.  2. Brain exercises such as Lumosity, word puzzles, word games, card games.  3. Keep notepad to write things down. Make lists and follow them.  4.Engage in 20-30 minutes daily physical exercise: Walking, light weights, swimming, etc.  5. Get at least 7-9 hours sleep nightly.  6. Social engagement and having a community and support system will help keep your mind active and healthy.  7. If you feel depressed or down, reach out and get help.    Here are more resources available for you :    a. Consultation with a  who specializes in cognitive decline in older adulthood. They can assist with counseling, educational resources, patient support groups, caregiver support groups, and preparation for future changes. Beth Wachter at [facility] ([address] [place] [address] [address] in [place]; [phone]) could help with a referral, or she can contact the Alzheimer's Association 24-hour Helpline (1-166.565.6386).    b. Tricia Diego at [facility] runs a caregiver training program that uses empirically based techniques to prepare and equip caregivers for the demands of that role when caring for someone with dementia.    c. Helpful resources for addressing the day-to-day challenges of cognitive  dysfunction are offered at the Alzheimer's Association (now the Alzheimer's Disease and Related Disorders Association) website (www.alz.org) or by calling their 24-hour Helpline (1-571.242.6606).    nelly. The [employer] [place] website also has helpful information: http://www.caregiver.org/caregiver/jsp/home.jsp      Radha Lazar, DO  [facility]    I personally spent 50 minutes on the day of the visit completing the review of the medical record and outside records, obtaining history and performing an appropriate physical exam, patient care, counseling and education, placing orders, independently reviewing results, communicating with the patient/family and other providers, coordinating care and performing appropriate clinical documentation.

## 2025-07-03 DIAGNOSIS — L29.9 PRURITUS OF SKIN: ICD-10-CM

## 2025-07-03 RX ORDER — HYDROXYZINE HYDROCHLORIDE 25 MG/1
25 TABLET, FILM COATED ORAL 3 TIMES DAILY
Qty: 60 TABLET | Refills: 1 | Status: SHIPPED | OUTPATIENT
Start: 2025-07-03

## 2025-07-03 NOTE — TELEPHONE ENCOUNTER
Rx Refill Request     Name: Marlene Alvarez  :  1945   Medication Name:  hydrOXYzine HCL (Atarax) 25 mg tablet   Specific Pharmacy location:  [facility]/pharmacy #[id number] [address] AT [place] [id number]  Phone: [phone]  Fax: [phone]   Date of last appointment:  2025   Date of next appointment:  2025   Best number to reach patient:  [phone]

## 2025-07-23 DIAGNOSIS — F33.1 MODERATE EPISODE OF RECURRENT MAJOR DEPRESSIVE DISORDER: ICD-10-CM

## 2025-07-23 DIAGNOSIS — F41.1 GENERALIZED ANXIETY DISORDER: ICD-10-CM

## 2025-07-23 RX ORDER — PAROXETINE 20 MG/1
20 TABLET, FILM COATED ORAL EVERY MORNING
Qty: 90 TABLET | Refills: 0 | Status: SHIPPED | OUTPATIENT
Start: 2025-07-23

## 2025-07-26 DIAGNOSIS — I10 ESSENTIAL HYPERTENSION: ICD-10-CM

## 2025-07-26 DIAGNOSIS — E03.9 ACQUIRED HYPOTHYROIDISM: ICD-10-CM

## 2025-07-26 DIAGNOSIS — E78.2 MIXED HYPERLIPIDEMIA: ICD-10-CM

## 2025-08-01 NOTE — PROGRESS NOTES
Patient ID: Marlene Alvarez is a 79 y.o. female.    Subjective    HPI  Ms. Marlene Alvarez is a 79 y.o. female presenting for initial consultation at the request of Dr. Up, for chronic anemia.      Most recent blood work on 1/2/2024 shows hemoglobin 11.2. Looking back a year ago she was 10.7 and 2 years ago was 11.34. MCV has always been normal. WBC and platelets also in the normal range. She did have iron studies, folate and B12 which were all normal. TSH checked in December 2023 was normal. Chemistry panel checked on 12/26/2023 does show some baseline creatinine elevation of 1.41. It looks like her creatinine has ranged from 1.4 to 1.5 over the last year. SPEP checked 1/2/2024 was negative.      Patient reports that she has had a 5-week history of itchiness. Denies any new rash and no history of rashes. She denies using any new products and no new medications or supplements. She has 2 cats. She denies any history of pruritus with her 4 pregnancies. No personal or FHx of blood clots or stroke. Denies any new chest pain, palpitations, cough or shortness of breath. No abdominal pain or discomfort. No new swelling. She has had a cholecystectomy. No other complaints today.      Interval Events:  8/4/25:  Patient presents for follow-up for anemia. Most recent CBC today, Hb 12.8 g/dL.    She reports she is feeling well today. She denies chest pain, fatigue, shortness of breath.     Patient's past medical history, surgical history, family history and social history reviewed.    Review of Systems:   Review of Systems:    Positive per HPI, otherwise negative.       Objective    Vitals:    08/04/25 1102   BP: 175/70   Pulse: 71   Resp: 16   Temp: 36.7 °C (98.1 °F)   SpO2: 95%       Physical Exam  Gen: appears well in clinic, NAD  HEENT: atraumatic head, normocephalic, EOMI, conjunctiva normal  LUNG: no increased WOB, CTAB  CV: No JVD. RRR  GI: soft, NT, ND  LE: no LE edema  Skin: no obvious rashes or lesions on visible  skin  Neuro: interactive, no focal deficits noted  Psych: normal mood and affect    Performance Status:  Symptomatic; fully ambulatory    Labs/Imaging/Pathology: personally reviewed reports and images in Epic electronic medical record system. Pertinent results as it related to the plan represented in below in assessment and plan.     Assessment/Plan   1. Anemia  2. CKD  3. Pruritus     - Patient has longstanding history of mild normocytic anemia for at least the last 1.5 years.  - Given the CDK likely multifactorial in the setting CKD versus any chronic inflammation, however she does report new pruritus over the last 5 weeks and we discussed different etiologies. She does not have a new rash, no new changes in medications or supplements or topical ointments or   changes in body wash, deodorant or lotion.    - She has never had pruritus with other health problems in the past.   - She does have a history of cholecystectomy. Denies any abdominal symptoms.   - No pruritus with any of her pregnancies.  - We discussed there could be a link with her anemia if she had an underlying myeloproliferative neoplasm.  - Today we will assess her anemia with a reticulocyte count to assess for marrow function.  - We will plan to check a kappa/lamda ratio to complete workup for a plasma cell dyscrasia.  - We will repeat a chemistry panel to ensure there is no other pathology from the liver and repeat kidney function.   - If all of the blood work is unremarkable we could consider a bone marrow biopsy to assess further for an underlying MPN.   - RTC as a phone visit to discuss results in 1 week.      1/26/24: Telephone call  - we discussed anemia likely multifactorial   - likely some component of CKD and will check EPO level next visit  - we discussed retic index consistent with hypoproliferation and would recommend bone marrow evaluation as primary bone marrow dysfunction also in differential  - pt prefers to watch and wait and if Hgb  drops below 10 would consider marrow to help determine etiology and if benefit from NOEMÍ    7/26/24:  - Hbg is 11.2 with no other cytopenias.  - We discussed that ultimately we can not rule out a primary bone marrow dysfunction.  - Patient would like to continue to monitor only at this time.   - She is having some pruritis and we discussed this could be a component of MPN's but she prefers to continue to monitor at this time.  - Follow-up in 6 months with repeat labs.     8/4/25:   - Counts are stable   - No new cytopenias  - discussed no further hematologic workup recommended at this time   - Patient is aware if there were any new issues, I would be happy to see her at any point   - RTC as needed      Reviewed ongoing medical problems and how they relate to her anemia, will continue long term monitoring.    RTC as needed  This note has been transcribed using a medical scribe and there is a possibility of unintentional typing misprints    Diagnoses and all orders for this visit:  Chronic anemia  -     Clinic Appointment Request      oDttie Knowles MD  Hematology/Oncology  [facility]      Scribe Attestation  By signing my name below, Christen HOWELL Scribe, attest that this documentation has been prepared under the direction and in the presence of Dottie Knowles MD.    Provider Attestation  Dottie HOWELL MD, personally performed the services described in the documentation as scribed by Christen Gallagher, in my presence, and confirm it is both accurate and complete.  Time Spent  Prep time on day of patient encounter: 5 minutes  Time spent directly with patient, family or caregiver: 15 minutes  Additional Time Spent on Patient Care Activities: 5 minutes  Documentation Time: 5 minutes  Other Time Spent: 0 minutes  Total: 30 minutes

## 2025-08-03 DIAGNOSIS — D64.9 CHRONIC ANEMIA: ICD-10-CM

## 2025-08-04 ENCOUNTER — OFFICE VISIT (OUTPATIENT)
Dept: HEMATOLOGY/ONCOLOGY | Facility: CLINIC | Age: 80
End: 2025-08-04
Payer: MEDICARE

## 2025-08-04 ENCOUNTER — LAB (OUTPATIENT)
Dept: LAB | Facility: CLINIC | Age: 80
End: 2025-08-04
Payer: MEDICARE

## 2025-08-04 VITALS
TEMPERATURE: 98.1 F | OXYGEN SATURATION: 95 % | RESPIRATION RATE: 16 BRPM | HEART RATE: 71 BPM | DIASTOLIC BLOOD PRESSURE: 70 MMHG | WEIGHT: 177.69 LBS | SYSTOLIC BLOOD PRESSURE: 175 MMHG | BODY MASS INDEX: 32.5 KG/M2

## 2025-08-04 DIAGNOSIS — D64.9 CHRONIC ANEMIA: ICD-10-CM

## 2025-08-04 PROCEDURE — 99214 OFFICE O/P EST MOD 30 MIN: CPT | Performed by: INTERNAL MEDICINE

## 2025-08-04 PROCEDURE — 1126F AMNT PAIN NOTED NONE PRSNT: CPT | Performed by: INTERNAL MEDICINE

## 2025-08-04 PROCEDURE — 3077F SYST BP >= 140 MM HG: CPT | Performed by: INTERNAL MEDICINE

## 2025-08-04 PROCEDURE — 85025 COMPLETE CBC W/AUTO DIFF WBC: CPT | Performed by: INTERNAL MEDICINE

## 2025-08-04 PROCEDURE — 80053 COMPREHEN METABOLIC PANEL: CPT | Performed by: INTERNAL MEDICINE

## 2025-08-04 PROCEDURE — 1159F MED LIST DOCD IN RCRD: CPT | Performed by: INTERNAL MEDICINE

## 2025-08-04 PROCEDURE — 3078F DIAST BP <80 MM HG: CPT | Performed by: INTERNAL MEDICINE

## 2025-08-04 PROCEDURE — 82728 ASSAY OF FERRITIN: CPT | Performed by: INTERNAL MEDICINE

## 2025-08-04 PROCEDURE — 83540 ASSAY OF IRON: CPT | Performed by: INTERNAL MEDICINE

## 2025-08-04 ASSESSMENT — PAIN SCALES - GENERAL: PAINLEVEL_OUTOF10: 0-NO PAIN

## 2025-08-08 ENCOUNTER — HOSPITAL ENCOUNTER (OUTPATIENT)
Dept: RADIOLOGY | Facility: CLINIC | Age: 80
Discharge: HOME | End: 2025-08-08
Payer: MEDICARE

## 2025-08-08 DIAGNOSIS — F03.A0 MILD DEMENTIA WITHOUT BEHAVIORAL DISTURBANCE, PSYCHOTIC DISTURBANCE, MOOD DISTURBANCE, OR ANXIETY, UNSPECIFIED DEMENTIA TYPE: ICD-10-CM

## 2025-08-08 DIAGNOSIS — K21.9 GASTRO-ESOPHAGEAL REFLUX DISEASE WITHOUT ESOPHAGITIS: ICD-10-CM

## 2025-08-08 PROCEDURE — 3430000001 HC RX 343 DIAGNOSTIC RADIOPHARMACEUTICALS: Performed by: STUDENT IN AN ORGANIZED HEALTH CARE EDUCATION/TRAINING PROGRAM

## 2025-08-08 PROCEDURE — A9586 FLORBETAPIR F18: HCPCS | Performed by: STUDENT IN AN ORGANIZED HEALTH CARE EDUCATION/TRAINING PROGRAM

## 2025-08-08 RX ORDER — FAMOTIDINE 20 MG/1
20 TABLET, FILM COATED ORAL 2 TIMES DAILY
Qty: 180 TABLET | Refills: 0 | Status: SHIPPED | OUTPATIENT
Start: 2025-08-08

## 2025-08-08 RX ADMIN — FLORBETAPIR F 18 10.9 MILLICURIE: 51 INJECTION, SOLUTION INTRAVENOUS at 12:55

## 2025-08-09 DIAGNOSIS — E03.9 ACQUIRED HYPOTHYROIDISM: ICD-10-CM

## 2025-08-09 RX ORDER — LEVOTHYROXINE SODIUM 175 UG/1
TABLET ORAL
Qty: 90 TABLET | Refills: 0 | Status: SHIPPED | OUTPATIENT
Start: 2025-08-09

## 2025-08-11 DIAGNOSIS — R41.3 MEMORY IMPAIRMENT: Primary | ICD-10-CM

## 2025-08-12 ENCOUNTER — HOSPITAL ENCOUNTER (OUTPATIENT)
Dept: RADIOLOGY | Facility: CLINIC | Age: 80
Discharge: HOME | End: 2025-08-12
Payer: MEDICARE

## 2025-08-12 ENCOUNTER — OFFICE VISIT (OUTPATIENT)
Dept: ORTHOPEDIC SURGERY | Facility: CLINIC | Age: 80
End: 2025-08-12
Payer: MEDICARE

## 2025-08-12 ENCOUNTER — HOSPITAL ENCOUNTER (OUTPATIENT)
Dept: RADIOLOGY | Facility: EXTERNAL LOCATION | Age: 80
Discharge: HOME | End: 2025-08-12

## 2025-08-12 DIAGNOSIS — M25.562 ACUTE PAIN OF LEFT KNEE: ICD-10-CM

## 2025-08-12 DIAGNOSIS — M17.12 ARTHRITIS OF KNEE, LEFT: Primary | ICD-10-CM

## 2025-08-12 DIAGNOSIS — M17.12 PRIMARY LOCALIZED OSTEOARTHRITIS OF LEFT KNEE: ICD-10-CM

## 2025-08-12 PROCEDURE — 1159F MED LIST DOCD IN RCRD: CPT | Performed by: FAMILY MEDICINE

## 2025-08-12 PROCEDURE — 2500000004 HC RX 250 GENERAL PHARMACY W/ HCPCS (ALT 636 FOR OP/ED): Performed by: FAMILY MEDICINE

## 2025-08-12 PROCEDURE — 99214 OFFICE O/P EST MOD 30 MIN: CPT | Performed by: FAMILY MEDICINE

## 2025-08-12 PROCEDURE — 73564 X-RAY EXAM KNEE 4 OR MORE: CPT | Mod: LT

## 2025-08-12 PROCEDURE — 73564 X-RAY EXAM KNEE 4 OR MORE: CPT | Mod: LEFT SIDE | Performed by: FAMILY MEDICINE

## 2025-08-12 PROCEDURE — 99212 OFFICE O/P EST SF 10 MIN: CPT | Mod: 25 | Performed by: FAMILY MEDICINE

## 2025-08-12 PROCEDURE — 1160F RVW MEDS BY RX/DR IN RCRD: CPT | Performed by: FAMILY MEDICINE

## 2025-08-12 PROCEDURE — 20611 DRAIN/INJ JOINT/BURSA W/US: CPT | Mod: LT | Performed by: FAMILY MEDICINE

## 2025-08-12 RX ORDER — TRIAMCINOLONE ACETONIDE 40 MG/ML
40 INJECTION, SUSPENSION INTRA-ARTICULAR; INTRAMUSCULAR
Status: COMPLETED | OUTPATIENT
Start: 2025-08-12 | End: 2025-08-12

## 2025-08-12 RX ORDER — LIDOCAINE HYDROCHLORIDE 10 MG/ML
6 INJECTION, SOLUTION INFILTRATION; PERINEURAL
Status: COMPLETED | OUTPATIENT
Start: 2025-08-12 | End: 2025-08-12

## 2025-08-12 RX ADMIN — LIDOCAINE HYDROCHLORIDE 6 ML: 10 INJECTION, SOLUTION INFILTRATION; PERINEURAL at 10:55

## 2025-08-12 RX ADMIN — TRIAMCINOLONE ACETONIDE 40 MG: 400 INJECTION, SUSPENSION INTRA-ARTICULAR; INTRAMUSCULAR at 10:55

## 2025-08-21 ENCOUNTER — APPOINTMENT (OUTPATIENT)
Dept: PRIMARY CARE | Facility: CLINIC | Age: 80
End: 2025-08-21
Payer: MEDICARE

## 2025-08-21 VITALS
RESPIRATION RATE: 14 BRPM | HEART RATE: 74 BPM | OXYGEN SATURATION: 98 % | WEIGHT: 176.6 LBS | SYSTOLIC BLOOD PRESSURE: 128 MMHG | HEIGHT: 62 IN | BODY MASS INDEX: 32.5 KG/M2 | TEMPERATURE: 96 F | DIASTOLIC BLOOD PRESSURE: 62 MMHG

## 2025-08-21 DIAGNOSIS — F51.04 CHRONIC INSOMNIA: ICD-10-CM

## 2025-08-21 DIAGNOSIS — F41.1 GENERALIZED ANXIETY DISORDER: ICD-10-CM

## 2025-08-21 DIAGNOSIS — E03.9 ACQUIRED HYPOTHYROIDISM: ICD-10-CM

## 2025-08-21 DIAGNOSIS — F33.1 MODERATE EPISODE OF RECURRENT MAJOR DEPRESSIVE DISORDER: ICD-10-CM

## 2025-08-21 DIAGNOSIS — E78.2 MIXED HYPERLIPIDEMIA: ICD-10-CM

## 2025-08-21 DIAGNOSIS — D64.9 CHRONIC ANEMIA: ICD-10-CM

## 2025-08-21 DIAGNOSIS — I10 ESSENTIAL HYPERTENSION: Primary | ICD-10-CM

## 2025-08-21 DIAGNOSIS — N18.32 STAGE 3B CHRONIC KIDNEY DISEASE (MULTI): ICD-10-CM

## 2025-08-21 DIAGNOSIS — K21.9 GASTRO-ESOPHAGEAL REFLUX DISEASE WITHOUT ESOPHAGITIS: ICD-10-CM

## 2025-08-21 PROCEDURE — 3074F SYST BP LT 130 MM HG: CPT | Performed by: FAMILY MEDICINE

## 2025-08-21 PROCEDURE — G2211 COMPLEX E/M VISIT ADD ON: HCPCS | Performed by: FAMILY MEDICINE

## 2025-08-21 PROCEDURE — 99214 OFFICE O/P EST MOD 30 MIN: CPT | Performed by: FAMILY MEDICINE

## 2025-08-21 PROCEDURE — 3078F DIAST BP <80 MM HG: CPT | Performed by: FAMILY MEDICINE

## 2025-08-21 PROCEDURE — 1159F MED LIST DOCD IN RCRD: CPT | Performed by: FAMILY MEDICINE

## 2025-08-21 PROCEDURE — 1160F RVW MEDS BY RX/DR IN RCRD: CPT | Performed by: FAMILY MEDICINE

## 2025-08-21 RX ORDER — FAMOTIDINE 20 MG/1
20 TABLET, FILM COATED ORAL 2 TIMES DAILY
Qty: 180 TABLET | Refills: 0 | Status: SHIPPED | OUTPATIENT
Start: 2025-08-21

## 2025-08-21 RX ORDER — PAROXETINE 20 MG/1
20 TABLET, FILM COATED ORAL EVERY MORNING
Qty: 90 TABLET | Refills: 0 | Status: SHIPPED | OUTPATIENT
Start: 2025-08-21

## 2025-08-21 RX ORDER — LEVOTHYROXINE SODIUM 175 UG/1
175 TABLET ORAL DAILY
Qty: 90 TABLET | Refills: 0 | Status: SHIPPED | OUTPATIENT
Start: 2025-08-21

## 2025-08-21 ASSESSMENT — ANXIETY QUESTIONNAIRES
IF YOU CHECKED OFF ANY PROBLEMS ON THIS QUESTIONNAIRE, HOW DIFFICULT HAVE THESE PROBLEMS MADE IT FOR YOU TO DO YOUR WORK, TAKE CARE OF THINGS AT HOME, OR GET ALONG WITH OTHER PEOPLE: NOT DIFFICULT AT ALL
7. FEELING AFRAID AS IF SOMETHING AWFUL MIGHT HAPPEN: NOT AT ALL
2. NOT BEING ABLE TO STOP OR CONTROL WORRYING: NOT AT ALL
5. BEING SO RESTLESS THAT IT IS HARD TO SIT STILL: NOT AT ALL
1. FEELING NERVOUS, ANXIOUS, OR ON EDGE: SEVERAL DAYS
4. TROUBLE RELAXING: NOT AT ALL
6. BECOMING EASILY ANNOYED OR IRRITABLE: NOT AT ALL
GAD7 TOTAL SCORE: 1
3. WORRYING TOO MUCH ABOUT DIFFERENT THINGS: NOT AT ALL

## 2025-08-21 ASSESSMENT — ENCOUNTER SYMPTOMS
POLYPHAGIA: 0
VOMITING: 0
TREMORS: 0
CONSTIPATION: 0
LEG SWELLING: 0
INSOMNIA: 1
DEPRESSED MOOD: 1
POLYDIPSIA: 0
DIZZINESS: 0
BRUISES/BLEEDS EASILY: 1
ABDOMINAL PAIN: 0
RHINORRHEA: 0
COUGH: 0
PALPITATIONS: 0
SHORTNESS OF BREATH: 0
FREQUENCY: 0
PANIC: 0
HEMATURIA: 0
LIGHT-HEADEDNESS: 0
HEADACHES: 0
RESTLESSNESS: 0
NUMBNESS: 0
DYSURIA: 0
SORE THROAT: 0
CHILLS: 0
NERVOUS/ANXIOUS: 1
WHEEZING: 0
DIARRHEA: 0
FEVER: 0

## 2025-08-28 ENCOUNTER — OFFICE VISIT (OUTPATIENT)
Dept: NEUROLOGY | Facility: CLINIC | Age: 80
End: 2025-08-28
Payer: MEDICARE

## 2025-08-28 ASSESSMENT — ANXIETY QUESTIONNAIRES
7. FEELING AFRAID AS IF SOMETHING AWFUL MIGHT HAPPEN: NOT AT ALL
IF YOU CHECKED OFF ANY PROBLEMS ON THIS QUESTIONNAIRE, HOW DIFFICULT HAVE THESE PROBLEMS MADE IT FOR YOU TO DO YOUR WORK, TAKE CARE OF THINGS AT HOME, OR GET ALONG WITH OTHER PEOPLE: NOT DIFFICULT AT ALL
6. BECOMING EASILY ANNOYED OR IRRITABLE: NOT AT ALL
4. TROUBLE RELAXING: NOT AT ALL
1. FEELING NERVOUS, ANXIOUS, OR ON EDGE: NOT AT ALL
GAD7 TOTAL SCORE: 0
2. NOT BEING ABLE TO STOP OR CONTROL WORRYING: NOT AT ALL
3. WORRYING TOO MUCH ABOUT DIFFERENT THINGS: NOT AT ALL
5. BEING SO RESTLESS THAT IT IS HARD TO SIT STILL: NOT AT ALL

## 2025-08-28 ASSESSMENT — ENCOUNTER SYMPTOMS
DEPRESSION: 0
LOSS OF SENSATION IN FEET: 0
OCCASIONAL FEELINGS OF UNSTEADINESS: 0

## 2025-08-29 LAB
ALBUMIN SERPL-MCNC: 4.7 G/DL (ref 3.6–5.1)
ALP SERPL-CCNC: 38 U/L (ref 37–153)
ALT SERPL-CCNC: 36 U/L (ref 6–29)
ANION GAP SERPL CALCULATED.4IONS-SCNC: 10 MMOL/L (CALC) (ref 7–17)
AST SERPL-CCNC: 65 U/L (ref 10–35)
BASOPHILS # BLD AUTO: 90 CELLS/UL (ref 0–200)
BASOPHILS NFR BLD AUTO: 1.3 %
BILIRUB SERPL-MCNC: 0.5 MG/DL (ref 0.2–1.2)
BUN SERPL-MCNC: 28 MG/DL (ref 7–25)
CALCIUM SERPL-MCNC: 10.7 MG/DL (ref 8.6–10.4)
CHLORIDE SERPL-SCNC: 96 MMOL/L (ref 98–110)
CHOLEST SERPL-MCNC: 279 MG/DL
CHOLEST/HDLC SERPL: 2.6 (CALC)
CO2 SERPL-SCNC: 28 MMOL/L (ref 20–32)
CREAT SERPL-MCNC: 1.21 MG/DL (ref 0.6–1)
EGFRCR SERPLBLD CKD-EPI 2021: 46 ML/MIN/1.73M2
EOSINOPHIL # BLD AUTO: 242 CELLS/UL (ref 15–500)
EOSINOPHIL NFR BLD AUTO: 3.5 %
ERYTHROCYTE [DISTWIDTH] IN BLOOD BY AUTOMATED COUNT: 13.1 % (ref 11–15)
GLUCOSE SERPL-MCNC: 83 MG/DL (ref 65–99)
HCT VFR BLD AUTO: 40.5 % (ref 35–45)
HDLC SERPL-MCNC: 109 MG/DL
HGB BLD-MCNC: 13.5 G/DL (ref 11.7–15.5)
LDLC SERPL CALC-MCNC: 146 MG/DL (CALC)
LYMPHOCYTES # BLD AUTO: 1463 CELLS/UL (ref 850–3900)
LYMPHOCYTES NFR BLD AUTO: 21.2 %
MCH RBC QN AUTO: 31.4 PG (ref 27–33)
MCHC RBC AUTO-ENTMCNC: 33.3 G/DL (ref 32–36)
MCV RBC AUTO: 94.2 FL (ref 80–100)
MONOCYTES # BLD AUTO: 359 CELLS/UL (ref 200–950)
MONOCYTES NFR BLD AUTO: 5.2 %
NEUTROPHILS # BLD AUTO: 4747 CELLS/UL (ref 1500–7800)
NEUTROPHILS NFR BLD AUTO: 68.8 %
NONHDLC SERPL-MCNC: 170 MG/DL (CALC)
PLATELET # BLD AUTO: 334 THOUSAND/UL (ref 140–400)
PMV BLD REES-ECKER: 10 FL (ref 7.5–12.5)
POTASSIUM SERPL-SCNC: 4.4 MMOL/L (ref 3.5–5.3)
PROT SERPL-MCNC: 7.2 G/DL (ref 6.1–8.1)
RBC # BLD AUTO: 4.3 MILLION/UL (ref 3.8–5.1)
SODIUM SERPL-SCNC: 134 MMOL/L (ref 135–146)
T4 FREE SERPL-MCNC: 0.2 NG/DL (ref 0.8–1.8)
TRIGL SERPL-MCNC: 119 MG/DL
TSH SERPL-ACNC: 110.18 MIU/L (ref 0.4–4.5)
WBC # BLD AUTO: 6.9 THOUSAND/UL (ref 3.8–10.8)

## 2025-08-31 LAB — T PALLIDUM AB SER QL IA: NEGATIVE

## 2025-09-01 LAB — VIT B1 BLD-SCNC: 163 NMOL/L (ref 78–185)

## 2025-09-03 ENCOUNTER — APPOINTMENT (OUTPATIENT)
Dept: PRIMARY CARE | Facility: CLINIC | Age: 80
End: 2025-09-03
Payer: MEDICARE

## 2025-09-03 ENCOUNTER — TELEPHONE (OUTPATIENT)
Dept: PRIMARY CARE | Facility: CLINIC | Age: 80
End: 2025-09-03

## 2025-09-03 PROBLEM — R79.89 ELEVATED LFTS: Status: ACTIVE | Noted: 2025-09-03

## 2025-09-03 ASSESSMENT — ENCOUNTER SYMPTOMS
DYSURIA: 0
FATIGUE: 1
CHILLS: 0
ABDOMINAL PAIN: 0
HEADACHES: 0
CONSTIPATION: 0
POLYPHAGIA: 0
DIZZINESS: 0
PALPITATIONS: 0
COUGH: 0
HEMATURIA: 0
TREMORS: 0
ADENOPATHY: 0
RHINORRHEA: 0
NUMBNESS: 0
FEVER: 0
WHEEZING: 0
SORE THROAT: 0
BRUISES/BLEEDS EASILY: 0
FREQUENCY: 0
DIARRHEA: 0
SHORTNESS OF BREATH: 0
VOMITING: 0
POLYDIPSIA: 0

## 2025-10-20 ENCOUNTER — APPOINTMENT (OUTPATIENT)
Dept: UROLOGY | Facility: CLINIC | Age: 80
End: 2025-10-20
Payer: MEDICARE

## 2025-12-22 ENCOUNTER — APPOINTMENT (OUTPATIENT)
Dept: PRIMARY CARE | Facility: CLINIC | Age: 80
End: 2025-12-22
Payer: MEDICARE